# Patient Record
Sex: MALE | Race: WHITE | Employment: OTHER | URBAN - METROPOLITAN AREA
[De-identification: names, ages, dates, MRNs, and addresses within clinical notes are randomized per-mention and may not be internally consistent; named-entity substitution may affect disease eponyms.]

---

## 2017-01-11 RX ORDER — ATENOLOL 100 MG/1
100 TABLET ORAL 2 TIMES DAILY
Qty: 60 TAB | Refills: 5 | Status: SHIPPED | OUTPATIENT
Start: 2017-01-11 | End: 2017-07-13 | Stop reason: SDUPTHER

## 2017-01-13 RX ORDER — OMEPRAZOLE 20 MG/1
CAPSULE, DELAYED RELEASE ORAL
Qty: 30 CAP | Refills: 5 | Status: SHIPPED | OUTPATIENT
Start: 2017-01-13 | End: 2017-07-27 | Stop reason: ALTCHOICE

## 2017-06-18 DIAGNOSIS — D64.9 ANEMIA, UNSPECIFIED TYPE: Primary | ICD-10-CM

## 2017-06-18 DIAGNOSIS — I10 ESSENTIAL HYPERTENSION: ICD-10-CM

## 2017-06-23 ENCOUNTER — HOSPITAL ENCOUNTER (OUTPATIENT)
Dept: LAB | Age: 72
Discharge: HOME | End: 2017-06-23
Payer: MEDICARE

## 2017-06-23 PROCEDURE — 36415 COLL VENOUS BLD VENIPUNCTURE: CPT

## 2017-06-23 PROCEDURE — 80053 COMPREHEN METABOLIC PANEL: CPT

## 2017-06-23 PROCEDURE — 85025 COMPLETE CBC W/AUTO DIFF WBC: CPT

## 2017-06-24 LAB
ALBUMIN SERPL-MCNC: 4.5 G/DL (ref 3.5–4.8)
ALBUMIN/GLOB SERPL: 1.7 {RATIO} (ref 1.2–2.2)
ALP SERPL-CCNC: 77 IU/L (ref 39–117)
ALT SERPL-CCNC: 23 IU/L (ref 0–44)
AST SERPL-CCNC: 26 IU/L (ref 0–40)
BASOPHILS # BLD AUTO: 0 X10E3/UL (ref 0–0.2)
BASOPHILS NFR BLD AUTO: 1 %
BILIRUB SERPL-MCNC: 0.6 MG/DL (ref 0–1.2)
BUN SERPL-MCNC: 21 MG/DL (ref 8–27)
BUN/CREAT SERPL: 24 (ref 10–24)
CALCIUM SERPL-MCNC: 9.7 MG/DL (ref 8.6–10.2)
CHLORIDE SERPL-SCNC: 99 MMOL/L (ref 96–106)
CO2 SERPL-SCNC: 25 MMOL/L (ref 18–29)
CREAT SERPL-MCNC: 0.89 MG/DL (ref 0.76–1.27)
EOSINOPHIL # BLD AUTO: 0.2 X10E3/UL (ref 0–0.4)
EOSINOPHIL NFR BLD AUTO: 4 %
ERYTHROCYTE [DISTWIDTH] IN BLOOD BY AUTOMATED COUNT: 14.5 % (ref 12.3–15.4)
GLOBULIN SER CALC-MCNC: 2.7 G/DL (ref 1.5–4.5)
GLUCOSE SERPL-MCNC: 123 MG/DL (ref 65–99)
HCT VFR BLD AUTO: 46.6 % (ref 37.5–51)
HGB BLD-MCNC: 15.4 G/DL (ref 12.6–17.7)
IMM GRANULOCYTES # BLD: 0 X10E3/UL (ref 0–0.1)
IMM GRANULOCYTES NFR BLD: 0 %
LYMPHOCYTES # BLD AUTO: 2.1 X10E3/UL (ref 0.7–3.1)
LYMPHOCYTES NFR BLD AUTO: 36 %
MCH RBC QN AUTO: 33 PG (ref 26.6–33)
MCHC RBC AUTO-ENTMCNC: 33 G/DL (ref 31.5–35.7)
MCV RBC AUTO: 100 FL (ref 79–97)
MONOCYTES # BLD AUTO: 0.7 X10E3/UL (ref 0.1–0.9)
MONOCYTES NFR BLD AUTO: 11 %
NEUTROPHILS # BLD AUTO: 2.9 X10E3/UL (ref 1.4–7)
NEUTROPHILS NFR BLD AUTO: 48 %
PLATELET # BLD AUTO: 151 X10E3/UL (ref 150–379)
POTASSIUM SERPL-SCNC: 4.9 MMOL/L (ref 3.5–5.2)
PROT SERPL-MCNC: 7.2 G/DL (ref 6–8.5)
RBC # BLD AUTO: 4.66 X10E6/UL (ref 4.14–5.8)
SODIUM SERPL-SCNC: 141 MMOL/L (ref 134–144)
WBC # BLD AUTO: 5.9 X10E3/UL (ref 3.4–10.8)

## 2017-07-13 RX ORDER — ATENOLOL 100 MG/1
TABLET ORAL
Qty: 60 TAB | Refills: 4 | Status: SHIPPED | OUTPATIENT
Start: 2017-07-13 | End: 2017-10-09 | Stop reason: SDUPTHER

## 2017-10-09 RX ORDER — ATENOLOL 100 MG/1
TABLET ORAL
Qty: 60 TAB | Refills: 2 | Status: SHIPPED | OUTPATIENT
Start: 2017-10-09 | End: 2018-04-20 | Stop reason: SDUPTHER

## 2017-11-10 ENCOUNTER — OFFICE VISIT (OUTPATIENT)
Dept: INTERNAL MEDICINE CLINIC | Age: 72
End: 2017-11-10

## 2017-11-10 ENCOUNTER — HOSPITAL ENCOUNTER (OUTPATIENT)
Dept: GENERAL RADIOLOGY | Age: 72
Discharge: HOME | End: 2017-11-10
Payer: MEDICARE

## 2017-11-10 VITALS
WEIGHT: 263 LBS | HEART RATE: 64 BPM | TEMPERATURE: 98.4 F | BODY MASS INDEX: 34.85 KG/M2 | OXYGEN SATURATION: 96 % | DIASTOLIC BLOOD PRESSURE: 86 MMHG | SYSTOLIC BLOOD PRESSURE: 148 MMHG | HEIGHT: 73 IN | RESPIRATION RATE: 16 BRPM

## 2017-11-10 DIAGNOSIS — I10 ESSENTIAL HYPERTENSION: ICD-10-CM

## 2017-11-10 DIAGNOSIS — K51.319 ULCERATIVE RECTOSIGMOIDITIS WITH COMPLICATION (HCC): ICD-10-CM

## 2017-11-10 DIAGNOSIS — M47.26 OSTEOARTHRITIS OF SPINE WITH RADICULOPATHY, LUMBAR REGION: Primary | ICD-10-CM

## 2017-11-10 DIAGNOSIS — M47.26 OSTEOARTHRITIS OF SPINE WITH RADICULOPATHY, LUMBAR REGION: ICD-10-CM

## 2017-11-10 PROCEDURE — 72100 X-RAY EXAM L-S SPINE 2/3 VWS: CPT

## 2017-11-10 RX ORDER — TRAMADOL HYDROCHLORIDE 50 MG/1
50 TABLET ORAL
Qty: 40 TAB | Refills: 1 | Status: SHIPPED | OUTPATIENT
Start: 2017-11-10 | End: 2018-08-29 | Stop reason: SDUPTHER

## 2017-11-10 RX ORDER — ACETAMINOPHEN 325 MG/1
650 TABLET ORAL
COMMUNITY
Start: 2017-11-10 | End: 2019-04-24 | Stop reason: ALTCHOICE

## 2017-11-10 NOTE — MR AVS SNAPSHOT
Visit Information Date & Time Provider Department Dept. Phone Encounter #  
 11/10/2017  2:15 PM Yolanda Hand MD [address] [facility] [phone] [id number] Follow-up Instructions Return for Wellness Visit. Your Appointments 4/20/2018 10:30 AM  
Medicare Physical with Yolanda Hand MD  
[address] Internal Medicine [facility] Appt Note: [address] [facility] [address]  
[id number] [place] [address] Upcoming Health Maintenance Date Due Influenza Age 5 to Adult 8/1/2017 GLAUCOMA SCREENING Q2Y 10/1/2017 MEDICARE YEARLY EXAM 10/8/2017 COLONOSCOPY 2/7/2020 DTaP/Tdap/Td series (2 - Td) 8/8/2025 Allergies as of 11/10/2017  Review Complete On: 11/10/2017 By: Maria Antonia Rosario LPN Severity Noted Reaction Type Reactions No Known Allergies  10/07/2016    Other (comments) Current Immunizations  Reviewed on 10/7/2016 Name Date Influenza High Dose Vaccine PF 9/27/2016 Pneumococcal Conjugate (PCV-13) 9/1/2015 Pneumococcal Polysaccharide (PPSV-23) 10/3/2011 Td 8/8/2015 Tdap 8/8/2015 ZZZ-RETIRED (DO NOT USE) Pneumococcal Vaccine (Unspecified Type) 10/3/2011 Zoster Vaccine, Live 10/2/2015 Not reviewed this visit You Were Diagnosed With   
  
 Codes Comments Osteoarthritis of spine with radiculopathy, lumbar region    -  Primary ICD-10-CM: M47.26 
ICD-9-CM: 721.3 Essential hypertension     ICD-10-CM: I10 
ICD-9-CM: 401.9 Ulcerative [facility]os[facility] ([facility]     FFB-12-UH: O25.616 ICD-9-CM: 000. 3 Vitals BP Pulse Temp Resp Height(growth percentile) Weight(growth percentile) 148/86 64 98.4 °F (36.9 °C) (Oral) 16 6' 1\" (1.854 m) 263 lb (119.3 kg) SpO2 BMI Smoking Status 96% 34.7 kg/m2 Former Smoker Vitals History BMI and BSA Data Body Mass Index Body Surface Area 34.7 kg/m 2 2.48 m 2 Preferred Pharmacy Pharmacy Name Phone [facility] [phone] Your Updated Medication List  
  
   
This list is accurate as of: 11/10/17  3:03 PM.  Always use your most recent med list.  
  
  
  
  
 acetaminophen 325 mg tablet Commonly known as:  TYLENOL Take 2 Tabs by mouth every six (6) hours as needed for Pain. atenolol 100 mg tablet Commonly known as:  TENORMIN  
TAKE 1 TABLET BY MOUTH TWICE A DAY  
  
 FIBER PO Take  by mouth. MULTIVITAMIN PO Take  by mouth. Takes one po daily. traMADol 50 mg tablet Commonly known as:  ULTRAM  
Take 1 Tab by mouth every six (6) hours as needed for Pain. Max Daily Amount: 200 mg. Prescriptions Printed Refills  
 traMADol (ULTRAM) 50 mg tablet 1 Sig: Take 1 Tab by mouth every six (6) hours as needed for Pain. Max Daily Amount: 200 mg. Class: Print Route: Oral  
  
We Performed the Following CBC WITH AUTOMATED DIFF [39775 CPT(R)] LIPID PANEL [14701 CPT(R)] METABOLIC PANEL, COMPREHENSIVE [32531 CPT(R)] TSH RFX ON ABNORMAL TO FREE T4 [[id number] Custom] UA/M W/RFLX CULTURE, ROUTINE [[id number] Custom] Follow-up Instructions Return for Wellness Visit. To-Do List   
 11/10/2017 Imaging:  XR SPINE LUMB MIN 4 V Introducing [facility]! Dear Ronaldo Koehler: Thank you for requesting a [employer] account. Our records indicate that you already have an active [employer] account. You can access your account anytime at [email or web address] Did you know that you can access your hospital and ER discharge instructions at any time in [employer]? You can also review all of your test results from your hospital stay or ER visit. Additional Information If you have questions, please visit the Frequently Asked Questions section of the [employer] website at [email or web address] Remember, [employer] is NOT to be used for urgent needs. For medical emergencies, dial 911. Now available from your iPhone and Android! Please provide this summary of care documentation to your next provider. Your primary care clinician is listed as Tim [id number] If you have any questions after today's visit, please call [phone].

## 2017-11-11 NOTE — PROGRESS NOTES
HPI:  Aruna Myrick is a 71y.o. year old male who is here for a routine visit:    Here for follow-up visit. He fell 2 days ago when he lost his balance and struck the lower aspect of his spine. Since then he has had pain particularly with sitting. There is no pain that radiates to the legs. He does have long-standing chronic issues with lower back pain with pain radiating to the legs that is unchanged now. He is having an increased difficulty with tolerating the lower back pain for the chronic issues as well. He has had previous injections in the back with limited success. Denies any change in bowel or bladder habits. He has not been taking any Tylenol. Denies any chest pains or shortness of breath. Past Medical History:   Diagnosis Date    Allergic rhinitis     Arthritis     Back pain     Chronic pain lower back    plus many other joints    Hypertension     Hypogonadism male     PUD (peptic ulcer disease)     UC (ulcerative colitis) ([facility] 1/20/2010    Ulcerative colitis     Unspecified essential hypertension 1/20/2010       Past Surgical History:   Procedure Laterality Date    HX COLONOSCOPY  02/07/2017    3 yr f/u - Dr. Rod Madden         Prior to Admission medications    Medication Sig Start Date End Date Taking? Authorizing Provider   traMADol (ULTRAM) 50 mg tablet Take 1 Tab by mouth every six (6) hours as needed for Pain. Max Daily Amount: 200 mg. 11/10/17  Yes Jeanie Parekh III, MD   acetaminophen (TYLENOL) 325 mg tablet Take 2 Tabs by mouth every six (6) hours as needed for Pain. 11/10/17  Yes Jeanie Parekh III, MD   atenolol (TENORMIN) 100 mg tablet TAKE 1 TABLET BY MOUTH TWICE A DAY 10/9/17  Yes Jeanie Parekh III, MD   PSYLLIUM SEED, WITH DEXTROSE, (FIBER PO) Take  by mouth. Yes Historical Provider   MULTIVITAMIN PO Take  by mouth. Takes one po daily.     Yes Historical Provider       Social History     Social History    Marital status:      Spouse name: N/A    Number of children: N/A    Years of education: N/A     Occupational History    Not on file. Social History Main Topics    Smoking status: Former Smoker     Years: 15.00     Quit date: 1/1/1995    Smokeless tobacco: Never Used      Comment: quit smoking cigarettes 20 yrs ago    Alcohol use 3.6 oz/week     7 drink(s) per week    Drug use: No    Sexual activity: No     Other Topics Concern    Not on file     Social History Narrative          ROS  Per HPI    Visit Vitals    /86    Pulse 64    Temp 98.4 °F (36.9 °C) (Oral)    Resp 16    Ht 6' 1\" (1.854 m)    Wt 263 lb (119.3 kg)    SpO2 96%    BMI 34.7 kg/m2         Physical Exam   Physical Examination: General appearance - alert, well appearing, and in no distress  Mouth - mucous membranes moist, pharynx normal without lesions  Neck - supple, no significant adenopathy  Lymphatics - no palpable lymphadenopathy, no hepatosplenomegaly  Chest - clear to auscultation, no wheezes, rales or rhonchi, symmetric air entry  Heart - normal rate, regular rhythm, normal S1, S2, no murmurs, rubs, clicks or gallops  Abdomen - soft, nontender, nondistended, no masses or organomegaly  Back exam - limited range of motion, pain with motion noted during exam, tenderness noted marked tenderness across the lower lumbar and sacral spine. No bony deformity or bruising present. , sacroiliac joints and sciatic notches nontender, normal reflexes and strength bilateral lower extremities  Musculoskeletal - no joint tenderness, deformity or swelling  Extremities - peripheral pulses normal, no pedal edema, no clubbing or cyanosis      Assessment/Plan:  Diagnoses and all orders for this visit:    1. Osteoarthritis of spine with radiculopathy, lumbar region with acute increase in pain secondary to fall. Concerned about the possibility of an occult fracture. Also likely underlying spinal stenosis that needs to be dealt with.   I will refer him for an x-ray to evaluate for fracture. If negative would pursue an MRI and a pain management appointment to consider facet joint injections. In the meantime we will have him use Tylenol daily as well as a prescription for tramadol for severe pain. -     traMADol (ULTRAM) 50 mg tablet; Take 1 Tab by mouth every six (6) hours as needed for Pain. Max Daily Amount: 200 mg.    2. Essential hypertension blood pressure well controlled. Will continue current medicines for now. Check labs to be sure that kidney function is stable. -     CBC WITH AUTOMATED DIFF  -     METABOLIC PANEL, COMPREHENSIVE  -     LIPID PANEL  -     TSH RFX ON ABNORMAL TO FREE T4  -     UA/M W/RFLX CULTURE, ROUTINE    3. Ulcerative rectosigmoiditis with complication ([address] currently stable with no recurrence. No bleeding. Will check CBC for stability. Follow-up Disposition:  Return for Wellness Visit. Advised him to call back or return to office if symptoms worsen/change/persist.  Discussed expected course/resolution/complications of diagnosis in detail with patient. Medication risks/benefits/costs/interactions/alternatives discussed with patient. He was given an after visit summary which includes diagnoses, current medications, & vitals. He expressed understanding with the diagnosis and plan.

## 2017-11-13 ENCOUNTER — TELEPHONE (OUTPATIENT)
Dept: INTERNAL MEDICINE CLINIC | Age: 72
End: 2017-11-13

## 2017-11-13 DIAGNOSIS — M48.061 SPINAL STENOSIS OF LUMBAR REGION, UNSPECIFIED WHETHER NEUROGENIC CLAUDICATION PRESENT: Primary | ICD-10-CM
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
## 2017-11-13 DIAGNOSIS — M54.40 LOW BACK PAIN WITH SCIATICA, SCIATICA LATERALITY UNSPECIFIED, UNSPECIFIED BACK PAIN LATERALITY, UNSPECIFIED CHRONICITY: ICD-10-CM

## 2017-11-15 ENCOUNTER — HOSPITAL ENCOUNTER (OUTPATIENT)
Dept: MRI IMAGING | Age: 72
Discharge: HOME | End: 2017-11-15
Attending: INTERNAL MEDICINE
Payer: MEDICARE

## 2017-11-15 DIAGNOSIS — M48.061 SPINAL STENOSIS OF LUMBAR REGION, UNSPECIFIED WHETHER NEUROGENIC CLAUDICATION PRESENT: ICD-10-CM
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
## 2017-11-15 DIAGNOSIS — M54.40 LOW BACK PAIN WITH SCIATICA, SCIATICA LATERALITY UNSPECIFIED, UNSPECIFIED BACK PAIN LATERALITY, UNSPECIFIED CHRONICITY: ICD-10-CM

## 2017-11-15 PROCEDURE — 72148 MRI LUMBAR SPINE W/O DYE: CPT

## 2017-11-17 DIAGNOSIS — M47.26 OSTEOARTHRITIS OF SPINE WITH RADICULOPATHY, LUMBAR REGION: Primary | ICD-10-CM

## 2017-11-22 ENCOUNTER — HOSPITAL ENCOUNTER (OUTPATIENT)
Dept: LAB | Age: 72
Discharge: HOME | End: 2017-11-22
Payer: MEDICARE

## 2017-11-22 PROCEDURE — 85025 COMPLETE CBC W/AUTO DIFF WBC: CPT

## 2017-11-22 PROCEDURE — 83036 HEMOGLOBIN GLYCOSYLATED A1C: CPT

## 2017-11-22 PROCEDURE — 84443 ASSAY THYROID STIM HORMONE: CPT

## 2017-11-22 PROCEDURE — 36415 COLL VENOUS BLD VENIPUNCTURE: CPT

## 2017-11-22 PROCEDURE — 81001 URINALYSIS AUTO W/SCOPE: CPT

## 2017-11-22 PROCEDURE — 80061 LIPID PANEL: CPT

## 2017-11-22 PROCEDURE — 80053 COMPREHEN METABOLIC PANEL: CPT

## 2017-11-23 LAB
ALBUMIN SERPL-MCNC: 4.4 G/DL (ref 3.5–4.8)
ALBUMIN/GLOB SERPL: 1.8 {RATIO} (ref 1.2–2.2)
ALP SERPL-CCNC: 84 IU/L (ref 39–117)
ALT SERPL-CCNC: 22 IU/L (ref 0–44)
APPEARANCE UR: CLEAR
AST SERPL-CCNC: 23 IU/L (ref 0–40)
BACTERIA #/AREA URNS HPF: NORMAL /[HPF]
BASOPHILS # BLD AUTO: 0 X10E3/UL (ref 0–0.2)
BASOPHILS NFR BLD AUTO: 1 %
BILIRUB SERPL-MCNC: 0.4 MG/DL (ref 0–1.2)
BILIRUB UR QL STRIP: NEGATIVE
BUN SERPL-MCNC: 19 MG/DL (ref 8–27)
BUN/CREAT SERPL: 23 (ref 10–24)
CALCIUM SERPL-MCNC: 9.9 MG/DL (ref 8.6–10.2)
CASTS URNS QL MICRO: NORMAL /LPF
CHLORIDE SERPL-SCNC: 99 MMOL/L (ref 96–106)
CHOLEST SERPL-MCNC: 214 MG/DL (ref 100–199)
CO2 SERPL-SCNC: 27 MMOL/L (ref 18–29)
COLOR UR: YELLOW
CREAT SERPL-MCNC: 0.84 MG/DL (ref 0.76–1.27)
EOSINOPHIL # BLD AUTO: 0.3 X10E3/UL (ref 0–0.4)
EOSINOPHIL NFR BLD AUTO: 5 %
EPI CELLS #/AREA URNS HPF: NORMAL /HPF
ERYTHROCYTE [DISTWIDTH] IN BLOOD BY AUTOMATED COUNT: 13.3 % (ref 12.3–15.4)
GFR SERPLBLD CREATININE-BSD FMLA CKD-EPI: 102 ML/MIN/1.73
GFR SERPLBLD CREATININE-BSD FMLA CKD-EPI: 88 ML/MIN/1.73
GLOBULIN SER CALC-MCNC: 2.5 G/DL (ref 1.5–4.5)
GLUCOSE SERPL-MCNC: 129 MG/DL (ref 65–99)
GLUCOSE UR QL: NEGATIVE
HCT VFR BLD AUTO: 45.5 % (ref 37.5–51)
HDLC SERPL-MCNC: 49 MG/DL
HGB BLD-MCNC: 15.3 G/DL (ref 12.6–17.7)
HGB UR QL STRIP: NEGATIVE
IMM GRANULOCYTES # BLD: 0 X10E3/UL (ref 0–0.1)
IMM GRANULOCYTES NFR BLD: 0 %
KETONES UR QL STRIP: NEGATIVE
LDLC SERPL CALC-MCNC: 133 MG/DL (ref 0–99)
LEUKOCYTE ESTERASE UR QL STRIP: NEGATIVE
LYMPHOCYTES # BLD AUTO: 1.7 X10E3/UL (ref 0.7–3.1)
LYMPHOCYTES NFR BLD AUTO: 31 %
MCH RBC QN AUTO: 33.8 PG (ref 26.6–33)
MCHC RBC AUTO-ENTMCNC: 33.6 G/DL (ref 31.5–35.7)
MCV RBC AUTO: 101 FL (ref 79–97)
MICRO URNS: NORMAL
MICRO URNS: NORMAL
MONOCYTES # BLD AUTO: 0.6 X10E3/UL (ref 0.1–0.9)
MONOCYTES NFR BLD AUTO: 10 %
MUCOUS THREADS URNS QL MICRO: PRESENT
NEUTROPHILS # BLD AUTO: 2.9 X10E3/UL (ref 1.4–7)
NEUTROPHILS NFR BLD AUTO: 53 %
NITRITE UR QL STRIP: NEGATIVE
PH UR STRIP: 6 [PH] (ref 5–7.5)
PLATELET # BLD AUTO: 185 X10E3/UL (ref 150–379)
POTASSIUM SERPL-SCNC: 4.5 MMOL/L (ref 3.5–5.2)
PROT SERPL-MCNC: 6.9 G/DL (ref 6–8.5)
PROT UR QL STRIP: NEGATIVE
RBC # BLD AUTO: 4.52 X10E6/UL (ref 4.14–5.8)
RBC #/AREA URNS HPF: NORMAL /HPF
SODIUM SERPL-SCNC: 142 MMOL/L (ref 134–144)
SP GR UR: 1.02 (ref 1–1.03)
TRIGL SERPL-MCNC: 161 MG/DL (ref 0–149)
TSH SERPL DL<=0.005 MIU/L-ACNC: 1.35 UIU/ML (ref 0.45–4.5)
URINALYSIS REFLEX, 377202: NORMAL
UROBILINOGEN UR STRIP-MCNC: 0.2 MG/DL (ref 0.2–1)
VLDLC SERPL CALC-MCNC: 32 MG/DL (ref 5–40)
WBC # BLD AUTO: 5.5 X10E3/UL (ref 3.4–10.8)
WBC #/AREA URNS HPF: NORMAL /HPF

## 2017-12-01 LAB
HBA1C MFR BLD: 5.2 % (ref 4.8–5.6)
SPECIMEN STATUS REPORT, ROLRST: NORMAL

## 2018-04-03 RX ORDER — ATENOLOL 100 MG/1
TABLET ORAL
Qty: 60 TAB | Refills: 3 | Status: SHIPPED | OUTPATIENT
Start: 2018-04-03 | End: 2018-08-03 | Stop reason: SDUPTHER

## 2018-04-20 ENCOUNTER — OFFICE VISIT (OUTPATIENT)
Dept: INTERNAL MEDICINE CLINIC | Age: 73
End: 2018-04-20

## 2018-04-20 VITALS
RESPIRATION RATE: 14 BRPM | HEIGHT: 73 IN | OXYGEN SATURATION: 96 % | WEIGHT: 274.6 LBS | TEMPERATURE: 98 F | HEART RATE: 81 BPM | BODY MASS INDEX: 36.39 KG/M2

## 2018-04-20 DIAGNOSIS — Z71.89 ADVANCED CARE PLANNING/COUNSELING DISCUSSION: ICD-10-CM

## 2018-04-20 DIAGNOSIS — F41.9 ANXIETY: ICD-10-CM

## 2018-04-20 DIAGNOSIS — Z23 NEED FOR SHINGLES VACCINE: ICD-10-CM

## 2018-04-20 DIAGNOSIS — M48.062 SPINAL STENOSIS OF LUMBAR REGION WITH NEUROGENIC CLAUDICATION: ICD-10-CM

## 2018-04-20 DIAGNOSIS — Z13.31 SCREENING FOR DEPRESSION: ICD-10-CM

## 2018-04-20 DIAGNOSIS — Z12.5 PROSTATE CANCER SCREENING: ICD-10-CM

## 2018-04-20 DIAGNOSIS — I10 ESSENTIAL HYPERTENSION: ICD-10-CM

## 2018-04-20 DIAGNOSIS — K51.319 ULCERATIVE RECTOSIGMOIDITIS WITH COMPLICATION (HCC): ICD-10-CM

## 2018-04-20 DIAGNOSIS — Z23 ENCOUNTER FOR IMMUNIZATION: ICD-10-CM

## 2018-04-20 DIAGNOSIS — Z13.39 SCREENING FOR ALCOHOLISM: ICD-10-CM

## 2018-04-20 DIAGNOSIS — Z00.00 MEDICARE ANNUAL WELLNESS VISIT, SUBSEQUENT: Primary | ICD-10-CM

## 2018-04-20 PROBLEM — E66.01 SEVERE OBESITY (BMI 35.0-39.9) WITH COMORBIDITY (HCC): Status: ACTIVE | Noted: 2018-04-20

## 2018-04-20 RX ORDER — DULOXETIN HYDROCHLORIDE 20 MG/1
20 CAPSULE, DELAYED RELEASE ORAL DAILY
Qty: 30 CAP | Refills: 1 | Status: SHIPPED | OUTPATIENT
Start: 2018-04-20 | End: 2018-06-23 | Stop reason: SDUPTHER

## 2018-04-20 NOTE — PROGRESS NOTES
Elvia Cruz is a 71 y.o. male and presents for Annual Medicare Wellness Visit. Assessment of cognitive impairment: Alert and oriented x 3. Abuse Screen:  No flowsheet data found. Depression Screen:   PHQ over the last two weeks 4/20/2018   Little interest or pleasure in doing things Not at all   Feeling down, depressed or hopeless Several days   Total Score PHQ 2 1       Fall Risk Assessment:    Fall Risk Assessment, last 12 mths 4/20/2018   Able to walk? Yes   Fall in past 12 months? No   Fall with injury? -   Number of falls in past 12 months -   Fall Risk Score -       Activities of Daily Living:    ADL Assessment 4/20/2018   Feeding yourself No Help Needed   Getting from bed to chair No Help Needed   Getting dressed No Help Needed   Bathing or showering No Help Needed   Walk across the room (includes cane/walker) No Help Needed   Using the telphone No Help Needed   Taking your medications No Help Needed   Preparing meals No Help Needed   Managing money (expenses/bills) No Help Needed   Moderately strenuous housework (laundry) No Help Needed   Shopping for personal items (toiletries/medicines) No Help Needed   Shopping for groceries No Help Needed   Driving No Help Needed   Climbing a flight of stairs No Help Needed   Getting to places beyond walking distances No Help Needed       Health Maintenance:  Daily Low Dose Aspirin: no  Bone Density: NA  Glaucoma Screening: yes UTD with Dr. Hortensia Duarte 12/4/17  Immunizations:    Tetanus: up to date 8/8/15. Influenza: up to date 12/13/17. Shingles:  Zostavax: up to date 10/2/15. Shingrix:order placed   Pneumovax:  up to date 10/3/11. Prevnar: up to date 9/1/15. Cancer screening:    Cervical: NA.  Breast: NA.  Colon: up to date 2/7/17 q3 years. Prostate:  Order placed for PSA today, last PSA 1.2 on 7/8/16    Advance Care Planning:   End of Life Planning: has an advanced directive - a copy has been provided.   Provided pt with \"Respecting Choices packet of Information\" no  Offered facilitator session with NN no     Medications/Allergies: Reviewed with patient  Prior to Admission medications    Medication Sig Start Date End Date Taking? Authorizing Provider   atenolol (TENORMIN) 100 mg tablet TAKE ONE TABLET BY MOUTH TWICE A DAY 4/3/18  Yes Deborah Smith III, MD   PSYLLIUM SEED, WITH DEXTROSE, (FIBER PO) Take  by mouth. Yes Historical Provider   MULTIVITAMIN PO Take  by mouth. Takes one po daily. Yes Historical Provider   traMADol (ULTRAM) 50 mg tablet Take 1 Tab by mouth every six (6) hours as needed for Pain. Max Daily Amount: 200 mg. 11/10/17   Deborah Smith III, MD   acetaminophen (TYLENOL) 325 mg tablet Take 2 Tabs by mouth every six (6) hours as needed for Pain. 11/10/17   Abbi Del Rio MD     Allergies   Allergen Reactions    No Known Allergies Other (comments)       PSH: Reviewed with patient  Past Surgical History:   Procedure Laterality Date    HX COLONOSCOPY  02/07/2017    3 yr f/u - Dr. Pipe Watson EXTRACTION          SH: Reviewed with patient  Social History   Substance Use Topics    Smoking status: Former Smoker     Years: 15.00     Quit date: 1/1/1995    Smokeless tobacco: Never Used      Comment: quit smoking cigarettes 20 yrs ago    Alcohol use 3.6 oz/week     7 drink(s) per week       FH: Reviewed with patient  Family History   Problem Relation Age of Onset    Cancer Mother      ovarian,colon         Objective:  Visit Vitals    Pulse 81    Temp 98 °F (36.7 °C) (Oral)    Resp 14    Ht 6' 1\" (1.854 m)    Wt 274 lb 9.6 oz (124.6 kg)    SpO2 96%    BMI 36.23 kg/m2    Body mass index is 36.23 kg/(m^2). Alcohol Risk Screen:   On any occasion during past 3 months, have you had more than 3 drinks (female) or 4 drinks (male) containing alcohol? No  Do you average more than 7 drinks (female) or 14 drinks (male) per week?   Yes  Type and Amount: 3-4 drinks per night, counseled that recommendation is no more than 2 drinks per day for men. Tobacco Abuse:  No    Nutrition Screen:  eats a balanced diet    Hearing Loss:  denies any hearing loss    Vision Loss:   Wears glasses, contact lenses, or have any other visual impairment  Recent cataract surgery, wears reading glasses    Activities of Daily Living:  Self-care. Requires assistance with: no ADLs  Patient handle his/her own medications  yes     Exercise:  Over the last 7 days how many days have you exercised? Rides stationary bike at home daily    Current medical providers:    Patient Care Team:  Chitra Chester MD as PCP - General  Kaylan Daly MD (Gastroenterology)  Radha Reyes MD (Ophthalmology)      Plan:      No orders of the defined types were placed in this encounter. Health Maintenance   Topic Date Due    MEDICARE YEARLY EXAM  03/14/2018    GLAUCOMA SCREENING Q2Y  12/04/2019    COLONOSCOPY  02/07/2020    DTaP/Tdap/Td series (2 - Td) 08/08/2025    Hepatitis C Screening  Completed    ZOSTER VACCINE AGE 60>  Completed    Pneumococcal 65+ Low/Medium Risk  Addressed    Influenza Age 5 to Adult  Addressed       *Patient verbalized understanding and agreement with the plan. A copy of the After Visit Summary with personalized health plan was given to the patient today. Physical Exam will be performed by PCP and documented under a separate Progress Note.

## 2018-04-20 NOTE — PATIENT INSTRUCTIONS
Today you had a Medicare Wellness Visit. During this visit, we developed and/or updated your personalized health plan to prevent disease and disability based on your current health and risk factors. Please schedule an appt around this time next year so we can continue to keep you on the right path to living a healthy lifestyle. Schedule of Personalized Health Plan    The best way to stay healthy is to live a healthy lifestyle. A healthy lifestyle includes regular exercise, eating a well-balanced diet, keeping a healthy weight and not smoking. Regular physical exams and screening tests are another important way to take care of yourself. Preventive exams provided by health care providers can find health problems early when treatment works best and can keep you from getting certain diseases or illnesses. Preventive services include exams, lab tests, screenings, shots, monitoring and information to help you take care of your own health. All people over 65 should have a pneumonia shot. Pneumonia shots are usually only needed once in a lifetime unless your doctor decides differently. All people over 65 should have a yearly flu shot. People over 65 are at medium to high risk for Hepatitis B. Three shots are needed for complete protection. Talk with your provider for more details. In addition to your physical exam, some screening tests are recommended:    Diabetes Mellitus screening is recommended every year. Glaucoma is an eye disease caused by high pressure in the eye. An eye exam is recommended every year. Cardiovascular screening tests that check your cholesterol and other blood fat (lipid) levels are recommended every five years. Colorectal Cancer screening tests help to find pre-cancerous polyps (growths in the colon) so they can be removed before they turn into cancer. Tests ordered for screening depend on your personal and family history risk factors.       Here is a list of your current Health Maintenance items with a due date:  Health Maintenance   Topic Date Due    MEDICARE YEARLY EXAM  03/14/2018    GLAUCOMA SCREENING Q2Y  12/04/2019    COLONOSCOPY  02/07/2020    DTaP/Tdap/Td series (2 - Td) 08/08/2025    Hepatitis C Screening  Completed    ZOSTER VACCINE AGE 60>  Completed    Pneumococcal 65+ Low/Medium Risk  Addressed    Influenza Age 5 to Adult  Addressed

## 2018-04-20 NOTE — PROGRESS NOTES
HPI:  Molly Torres is a 71y.o. year old male who is here for a routine visit:    Here for his Medicare wellness visit as well as his follow-up visit. He was seen in consultation with the nurse navigator and history was reviewed and I concur with her findings. Initially his blood pressure was elevated here. On repeat his blood pressure had improved. He does note ongoing issues with lower back pain. He has fatigue in his legs when he walks. No numbness or tingling or weakness. No bowel or bladder change. Denies any melena or hematochezia. At home his blood pressure readings are in the 120/80 range. He does drink 4-5 glasses of wine per day and feels anxious and depressed. Denies suicidal ideation. He wanted to know about using medications for his back pain. He has had 2 epidural injections by the pain doctors with limited success. Past Medical History:   Diagnosis Date    Allergic rhinitis     Arthritis     Back pain     Chronic pain lower back    plus many other joints    Hypertension     Hypogonadism male     PUD (peptic ulcer disease)     UC (ulcerative colitis) ([facility] [facility] 1/20/2010    Ulcerative colitis     Unspecified essential hypertension 1/20/2010       Past Surgical History:   Procedure Laterality Date    HX COLONOSCOPY  02/07/2017    3 yr f/u - Dr. Darren Hernandez         Prior to Admission medications    Medication Sig Start Date End Date Taking? Authorizing Provider   varicella-zoster recombinant, PF, (SHINGRIX, PF,) 50 mcg/0.5 mL susr injection 0.5 mL by IntraMUSCular route once for 1 dose. 4/20/18 4/20/18 Yes David Fried III, MD   DULoxetine (CYMBALTA) 20 mg capsule Take 1 Cap by mouth daily. 4/20/18  Yes David Fried III, MD   atenolol (TENORMIN) 100 mg tablet TAKE ONE TABLET BY MOUTH TWICE A DAY 4/3/18  Yes David Fried III, MD   PSYLLIUM SEED, WITH DEXTROSE, (FIBER PO) Take  by mouth.    Yes Historical Provider MULTIVITAMIN PO Take  by mouth. Takes one po daily. Yes Historical Provider   traMADol (ULTRAM) 50 mg tablet Take 1 Tab by mouth every six (6) hours as needed for Pain. Max Daily Amount: 200 mg. 11/10/17   Justin Lugo III, MD   acetaminophen (TYLENOL) 325 mg tablet Take 2 Tabs by mouth every six (6) hours as needed for Pain. 11/10/17   Jose Del Rio MD       Social History     Social History    Marital status:      Spouse name: N/A    Number of children: N/A    Years of education: N/A     Occupational History    Not on file.      Social History Main Topics    Smoking status: Former Smoker     Years: 15.00     Quit date: 1/1/1995    Smokeless tobacco: Never Used      Comment: quit smoking cigarettes 20 yrs ago    Alcohol use 3.6 oz/week     7 drink(s) per week    Drug use: No    Sexual activity: No     Other Topics Concern    Not on file     Social History Narrative          ROS  Per HPI    Visit Vitals    Pulse 81    Temp 98 °F (36.7 °C) (Oral)    Resp 14    Ht 6' 1\" (1.854 m)    Wt 274 lb 9.6 oz (124.6 kg)    SpO2 96%    BMI 36.23 kg/m2         Physical Exam   Physical Examination: General appearance - alert, well appearing, and in no distress  Eyes - pupils equal and reactive, extraocular eye movements intact  Ears - bilateral TM's and external ear canals normal  Nose - normal and patent, no erythema, discharge or polyps  Mouth - mucous membranes moist, pharynx normal without lesions  Neck - supple, no significant adenopathy  Lymphatics - no palpable lymphadenopathy, no hepatosplenomegaly  Chest - clear to auscultation, no wheezes, rales or rhonchi, symmetric air entry  Heart - normal rate, regular rhythm, normal S1, S2, no murmurs, rubs, clicks or gallops  Abdomen - soft, nontender, nondistended, no masses or organomegaly  Rectal - deferred, not clinically indicated  Back exam - limited range of motion, pain with motion noted during exam, tenderness noted along the lower back muscles, negative straight-leg raise bilaterally at 45 degrees, normal reflexes and strength bilateral lower extremities  Neurological - alert, oriented, normal speech, no focal findings or movement disorder noted, motor and sensory grossly normal bilaterally  Musculoskeletal - no joint tenderness, deformity or swelling  Extremities - peripheral pulses normal, no pedal edema, no clubbing or cyanosis      Assessment/Plan:  Diagnoses and all orders for this visit:    1. Medicare annual wellness visit, subsequent    2. Advanced care planning/counseling discussion    3. Screening for depression    4. Screening for alcoholism    5. Need for shingles vaccine  -     varicella-zoster recombinant, PF, (SHINGRIX, PF,) 50 mcg/0.5 mL susr injection; 0.5 mL by IntraMUSCular route once for 1 dose. 6. Encounter for immunization  -     varicella-zoster recombinant, PF, (SHINGRIX, PF,) 50 mcg/0.5 mL susr injection; 0.5 mL by IntraMUSCular route once for 1 dose. 7. Essential hypertension -with a great degree of whitecoat hypertension. Will continue to monitor his blood pressures at home and check labs to be sure these are stable. -     CBC WITH AUTOMATED DIFF  -     METABOLIC PANEL, COMPREHENSIVE  -     LIPID PANEL  -     TSH RFX ON ABNORMAL TO FREE T4    8. Ulcerative rectosigmoiditis with complication ([facility] [address] -per GI. He is up-to-date on colonoscopies. 9. Prostate cancer screening  -     PSA SCREENING (SCREENING)  10.  Spinal stenosis mildwe will treat with Tylenol for now and as needed tramadol as well as the Cymbalta. He is aware that he needs to limit his alcohol consumption while taking this. Would consider the addition of facet joint injections or ablation if his pain persist.  Other orders -ongoing issues with anxiety and depression. Will try low-dose Cymbalta and see if this is helpful. I told him he cannot drink heavily while taking the Cymbalta and he is aware.   -     DULoxetine (CYMBALTA) 20 mg capsule; Take 1 Cap by mouth daily. Follow-up Disposition: 1-2 months. Advised him to call back or return to office if symptoms worsen/change/persist.  Discussed expected course/resolution/complications of diagnosis in detail with patient. Medication risks/benefits/costs/interactions/alternatives discussed with patient. He was given an after visit summary which includes diagnoses, current medications, & vitals. He expressed understanding with the diagnosis and plan.

## 2018-04-23 ENCOUNTER — HOSPITAL ENCOUNTER (OUTPATIENT)
Dept: LAB | Age: 73
Discharge: HOME | End: 2018-04-23
Payer: MEDICARE

## 2018-04-23 PROCEDURE — 80053 COMPREHEN METABOLIC PANEL: CPT

## 2018-04-23 PROCEDURE — 36415 COLL VENOUS BLD VENIPUNCTURE: CPT

## 2018-04-23 PROCEDURE — 80061 LIPID PANEL: CPT

## 2018-04-23 PROCEDURE — 84443 ASSAY THYROID STIM HORMONE: CPT

## 2018-04-23 PROCEDURE — 85025 COMPLETE CBC W/AUTO DIFF WBC: CPT

## 2018-04-23 PROCEDURE — 84153 ASSAY OF PSA TOTAL: CPT

## 2018-04-24 LAB
ALBUMIN SERPL-MCNC: 4.5 G/DL (ref 3.5–4.8)
ALBUMIN/GLOB SERPL: 1.6 {RATIO} (ref 1.2–2.2)
ALP SERPL-CCNC: 80 IU/L (ref 39–117)
ALT SERPL-CCNC: 26 IU/L (ref 0–44)
AST SERPL-CCNC: 30 IU/L (ref 0–40)
BASOPHILS # BLD AUTO: 0 X10E3/UL (ref 0–0.2)
BASOPHILS NFR BLD AUTO: 0 %
BILIRUB SERPL-MCNC: 0.8 MG/DL (ref 0–1.2)
BUN SERPL-MCNC: 21 MG/DL (ref 8–27)
BUN/CREAT SERPL: 21 (ref 10–24)
CALCIUM SERPL-MCNC: 9.6 MG/DL (ref 8.6–10.2)
CHLORIDE SERPL-SCNC: 97 MMOL/L (ref 96–106)
CHOLEST SERPL-MCNC: 214 MG/DL (ref 100–199)
CO2 SERPL-SCNC: 21 MMOL/L (ref 18–29)
CREAT SERPL-MCNC: 1.01 MG/DL (ref 0.76–1.27)
EOSINOPHIL # BLD AUTO: 0.2 X10E3/UL (ref 0–0.4)
EOSINOPHIL NFR BLD AUTO: 4 %
ERYTHROCYTE [DISTWIDTH] IN BLOOD BY AUTOMATED COUNT: 13.4 % (ref 12.3–15.4)
GFR SERPLBLD CREATININE-BSD FMLA CKD-EPI: 74 ML/MIN/1.73
GFR SERPLBLD CREATININE-BSD FMLA CKD-EPI: 86 ML/MIN/1.73
GLOBULIN SER CALC-MCNC: 2.8 G/DL (ref 1.5–4.5)
GLUCOSE SERPL-MCNC: 116 MG/DL (ref 65–99)
HCT VFR BLD AUTO: 46.4 % (ref 37.5–51)
HDLC SERPL-MCNC: 43 MG/DL
HGB BLD-MCNC: 16.5 G/DL (ref 13–17.7)
IMM GRANULOCYTES # BLD: 0 X10E3/UL (ref 0–0.1)
IMM GRANULOCYTES NFR BLD: 1 %
LDLC SERPL CALC-MCNC: 141 MG/DL (ref 0–99)
LYMPHOCYTES # BLD AUTO: 2.2 X10E3/UL (ref 0.7–3.1)
LYMPHOCYTES NFR BLD AUTO: 35 %
MCH RBC QN AUTO: 34.9 PG (ref 26.6–33)
MCHC RBC AUTO-ENTMCNC: 35.6 G/DL (ref 31.5–35.7)
MCV RBC AUTO: 98 FL (ref 79–97)
MONOCYTES # BLD AUTO: 0.8 X10E3/UL (ref 0.1–0.9)
MONOCYTES NFR BLD AUTO: 13 %
NEUTROPHILS # BLD AUTO: 3.1 X10E3/UL (ref 1.4–7)
NEUTROPHILS NFR BLD AUTO: 47 %
PLATELET # BLD AUTO: 140 X10E3/UL (ref 150–379)
POTASSIUM SERPL-SCNC: 4.2 MMOL/L (ref 3.5–5.2)
PROT SERPL-MCNC: 7.3 G/DL (ref 6–8.5)
PSA SERPL-MCNC: 1.9 NG/ML (ref 0–4)
RBC # BLD AUTO: 4.73 X10E6/UL (ref 4.14–5.8)
SODIUM SERPL-SCNC: 139 MMOL/L (ref 134–144)
TRIGL SERPL-MCNC: 152 MG/DL (ref 0–149)
TSH SERPL DL<=0.005 MIU/L-ACNC: 2.19 UIU/ML (ref 0.45–4.5)
VLDLC SERPL CALC-MCNC: 30 MG/DL (ref 5–40)
WBC # BLD AUTO: 6.3 X10E3/UL (ref 3.4–10.8)

## 2018-06-24 RX ORDER — DULOXETIN HYDROCHLORIDE 20 MG/1
CAPSULE, DELAYED RELEASE ORAL
Qty: 30 CAP | Refills: 0 | Status: SHIPPED | OUTPATIENT
Start: 2018-06-24 | End: 2018-06-25 | Stop reason: SDUPTHER

## 2018-06-25 RX ORDER — DULOXETIN HYDROCHLORIDE 20 MG/1
20 CAPSULE, DELAYED RELEASE ORAL DAILY
Qty: 90 CAP | Refills: 1 | Status: SHIPPED | OUTPATIENT
Start: 2018-06-25 | End: 2018-07-10 | Stop reason: SINTOL

## 2018-06-25 NOTE — TELEPHONE ENCOUNTER
Orders Placed This Encounter    DULoxetine (CYMBALTA) 20 mg capsule     Sig: Take 1 Cap by mouth daily. Dispense:  90 Cap     Refill:  1     The above medication refills were approved via verbal order by Dr. Serjio Farias III.

## 2018-07-10 ENCOUNTER — HOSPITAL ENCOUNTER (OUTPATIENT)
Dept: LAB | Age: 73
Discharge: HOME | End: 2018-07-10
Payer: MEDICARE

## 2018-07-10 ENCOUNTER — OFFICE VISIT (OUTPATIENT)
Dept: INTERNAL MEDICINE CLINIC | Age: 73
End: 2018-07-10

## 2018-07-10 VITALS
RESPIRATION RATE: 10 BRPM | OXYGEN SATURATION: 95 % | WEIGHT: 268 LBS | DIASTOLIC BLOOD PRESSURE: 82 MMHG | HEIGHT: 73 IN | HEART RATE: 80 BPM | BODY MASS INDEX: 35.52 KG/M2 | SYSTOLIC BLOOD PRESSURE: 130 MMHG | TEMPERATURE: 97.7 F

## 2018-07-10 DIAGNOSIS — K92.2 LOWER GI BLEED: Primary | ICD-10-CM

## 2018-07-10 DIAGNOSIS — K51.319 ULCERATIVE RECTOSIGMOIDITIS WITH COMPLICATION (HCC): ICD-10-CM

## 2018-07-10 LAB
BASOPHILS # BLD AUTO: 0 X10E3/UL (ref 0–0.2)
BASOPHILS NFR BLD AUTO: 1 %
EOSINOPHIL # BLD AUTO: 0.2 X10E3/UL (ref 0–0.4)
EOSINOPHIL NFR BLD AUTO: 3 %
ERYTHROCYTE [DISTWIDTH] IN BLOOD BY AUTOMATED COUNT: 14 % (ref 12.3–15.4)
HCT VFR BLD AUTO: 44.4 % (ref 37.5–51)
HGB BLD-MCNC: 15.8 G/DL (ref 13–17.7)
LYMPHOCYTES # BLD AUTO: 1.6 X10E3/UL (ref 0.7–3.1)
LYMPHOCYTES NFR BLD AUTO: 27 %
MCH RBC QN AUTO: 35 PG (ref 26.6–33)
MCHC RBC AUTO-ENTMCNC: 35.6 G/DL (ref 31.5–35.7)
MCV RBC AUTO: 98 FL (ref 79–97)
MONOCYTES # BLD AUTO: 0.7 X10E3/UL (ref 0.1–0.9)
MONOCYTES NFR BLD AUTO: 12 %
NEUTROPHILS # BLD AUTO: 3.4 X10E3/UL (ref 1.4–7)
NEUTROPHILS NFR BLD AUTO: 57 %
PLATELET # BLD AUTO: 139 X10E3/UL (ref 150–379)
RBC # BLD AUTO: 4.51 X10E6/UL (ref 4.14–5.8)
WBC # BLD AUTO: 5.9 X10E3/UL (ref 3.4–10.8)

## 2018-07-10 PROCEDURE — 36415 COLL VENOUS BLD VENIPUNCTURE: CPT

## 2018-07-10 PROCEDURE — 85025 COMPLETE CBC W/AUTO DIFF WBC: CPT

## 2018-07-10 RX ORDER — DESVENLAFAXINE 25 MG/1
25 TABLET, EXTENDED RELEASE ORAL DAILY
Qty: 30 TAB | Refills: 1 | Status: SHIPPED | OUTPATIENT
Start: 2018-07-10 | End: 2019-04-24 | Stop reason: ALTCHOICE

## 2018-07-10 NOTE — PROGRESS NOTES
HPI:  Manuel Powers is a 71y.o. year old male who is here for several issues. He had an episode of rectal bleeding that occurred 3 days ago on Saturday. Seemed to have occurred with a bowel movement. His bowel movement was otherwise normal.  No pain with the bowel movement. There was blood in the toilet and on the tissue but he is unclear how much. He had a normal bowel movement yesterday and none today. No further bleeding since Saturday. His last colonoscopy was about a year ago revealing some rectal polyps but otherwise negative for active colitis. He denies any melena. Denies nausea vomiting. Denies fevers or chills. Denies change in bladder habits. He is also concerned because the duloxetine he has been taking is making him very fatigued. He does note that it is helping with his lower back pain and anxiety. Past Medical History:   Diagnosis Date    Allergic rhinitis     Arthritis     Back pain     Chronic pain lower back    plus many other joints    Hypertension     Hypogonadism male     PUD (peptic ulcer disease)     [facility]    Ulcerative colitis     Unspecified essential hypertension 1/20/2010       Past Surgical History:   Procedure Laterality Date    HX COLONOSCOPY  02/07/2017    3 yr f/u - Dr. Angeli Stroud         Prior to Admission medications    Medication Sig Start Date End Date Taking? Authorizing Provider   desvenlafaxine succinate (PRISTIQ) 25 mg ER tablet Take 1 Tab by mouth daily. 7/10/18  Yes Citlalli Reveles III, MD   atenolol (TENORMIN) 100 mg tablet TAKE ONE TABLET BY MOUTH TWICE A DAY 4/3/18  Yes Citlalli Reveles III, MD   traMADol (ULTRAM) 50 mg tablet Take 1 Tab by mouth every six (6) hours as needed for Pain. Max Daily Amount: 200 mg. 11/10/17  Yes Citlalli Reveles III, MD   acetaminophen (TYLENOL) 325 mg tablet Take 2 Tabs by mouth every six (6) hours as needed for Pain.  11/10/17 Yes Preethi Moore III, MD   PSYLLIUM SEED, WITH DEXTROSE, (FIBER PO) Take  by mouth. Yes Historical Provider   MULTIVITAMIN PO Take  by mouth. Takes one po daily. Yes Historical Provider       Social History     Social History    Marital status:      Spouse name: N/A    Number of children: N/A    Years of education: N/A     Occupational History    Not on file. Social History Main Topics    Smoking status: Former Smoker     Years: 15.00     Quit date: 1/1/1995    Smokeless tobacco: Never Used      Comment: quit smoking cigarettes 20 yrs ago    Alcohol use 3.6 oz/week     7 drink(s) per week    Drug use: No    Sexual activity: No     Other Topics Concern    Not on file     Social History Narrative          ROS  Has change the timing of his duloxetine on several occasions with little change in fatigue. Visit Vitals    /82    Pulse 80    Temp 97.7 °F (36.5 °C) (Oral)    Resp 10    Ht 6' 1\" (1.854 m)    Wt 268 lb (121.6 kg)    SpO2 95%    BMI 35.36 kg/m2         Physical Exam   Physical Examination: General appearance - alert, well appearing, and in no distress  Mouth - mucous membranes moist, pharynx normal without lesions  Chest - clear to auscultation, no wheezes, rales or rhonchi, symmetric air entry  Heart - normal rate, regular rhythm, normal S1, S2, no murmurs, rubs, clicks or gallops  Abdomen - soft, nontender, nondistended, no masses or organomegaly  Neurological - alert, oriented, normal speech, no focal findings or movement disorder noted  Extremities - peripheral pulses normal, no pedal edema, no clubbing or cyanosis      Assessment/Plan:  Diagnoses and all orders for this visit:    1. Lower GI bleed -question teresa related to rectal fissure versus hemorrhoid or recurrent colitis. At this point will get a CBC to look for a drop in his hemoglobin. He will let me know if he develops any recurrent bleeding, fever, melena, or vomiting.   Will help arrange an urgent visit with GI to undergo a flexible sigmoidoscopy to evaluate for causes of the bleeding.  -     CBC WITH AUTOMATED DIFF  -     REFERRAL TO GASTROENTEROLOGY    2. Ulcerative rectosigmoiditis with complication ([facility] -per above. -     CBC WITH AUTOMATED DIFF  -     REFERRAL TO GASTROENTEROLOGY  3. Chronic lumbar radiculopathy with underlying anxiety-will taper off his duloxetine. We will have him start using Pristiq 25 mg a day. He is aware that this is an off label use to help with pain but should help with anxiety. He will let me know in 2-3 weeks how this is going. 4.  Situational hypertension-stable after he was called today in the office. Other orders  -     desvenlafaxine succinate (PRISTIQ) 25 mg ER tablet; Take 1 Tab by mouth daily. Follow-up Disposition: 1 month. Advised him to call back or return to office if symptoms worsen/change/persist.  Discussed expected course/resolution/complications of diagnosis in detail with patient. Medication risks/benefits/costs/interactions/alternatives discussed with patient. He was given an after visit summary which includes diagnoses, current medications, & vitals. He expressed understanding with the diagnosis and plan.

## 2018-08-03 RX ORDER — ATENOLOL 100 MG/1
TABLET ORAL
Qty: 180 TAB | Refills: 1 | Status: SHIPPED | OUTPATIENT
Start: 2018-08-03 | End: 2019-01-29 | Stop reason: SDUPTHER

## 2018-08-03 NOTE — TELEPHONE ENCOUNTER
Orders Placed This Encounter    atenolol (TENORMIN) 100 mg tablet     Sig: TAKE ONE TABLET BY MOUTH TWICE A DAY     Dispense:  180 Tab     Refill:  1     The above medication refills were approved via verbal order by Dr. Goldie Samuels III.

## 2018-08-29 DIAGNOSIS — M47.26 OSTEOARTHRITIS OF SPINE WITH RADICULOPATHY, LUMBAR REGION: ICD-10-CM

## 2018-08-29 RX ORDER — TRAMADOL HYDROCHLORIDE 50 MG/1
TABLET ORAL
Qty: 40 TAB | Refills: 0 | OUTPATIENT
Start: 2018-08-29 | End: 2019-04-24 | Stop reason: SDUPTHER

## 2018-08-30 NOTE — TELEPHONE ENCOUNTER
Orders Placed This Encounter    traMADol (ULTRAM) 50 mg tablet     Sig: TAKE ONE TABLET BY MOUTH EVERY 6 HOURS AS NEEDED FOR PAIN (MAX DAILY AMOUNT IS 200MG)     Dispense:  40 Tab     Refill:  0     The above controlled substance refill was called into patients pharmacy - [facility] - authorized by Dr. Rajwinder Huffman.

## 2018-10-15 ENCOUNTER — CLINICAL SUPPORT (OUTPATIENT)
Dept: INTERNAL MEDICINE CLINIC | Age: 73
End: 2018-10-15

## 2018-10-15 DIAGNOSIS — Z23 ENCOUNTER FOR IMMUNIZATION: Primary | ICD-10-CM

## 2019-01-29 RX ORDER — ATENOLOL 100 MG/1
TABLET ORAL
Qty: 180 TAB | Refills: 1 | Status: SHIPPED | OUTPATIENT
Start: 2019-01-29 | End: 2019-07-24 | Stop reason: SDUPTHER

## 2019-04-24 ENCOUNTER — OFFICE VISIT (OUTPATIENT)
Dept: INTERNAL MEDICINE CLINIC | Age: 74
End: 2019-04-24

## 2019-04-24 VITALS
WEIGHT: 276 LBS | HEART RATE: 84 BPM | DIASTOLIC BLOOD PRESSURE: 84 MMHG | HEIGHT: 73 IN | OXYGEN SATURATION: 94 % | SYSTOLIC BLOOD PRESSURE: 150 MMHG | TEMPERATURE: 98.4 F | RESPIRATION RATE: 14 BRPM | BODY MASS INDEX: 36.58 KG/M2

## 2019-04-24 DIAGNOSIS — Z00.00 MEDICARE ANNUAL WELLNESS VISIT, SUBSEQUENT: Primary | ICD-10-CM

## 2019-04-24 DIAGNOSIS — E55.9 VITAMIN D DEFICIENCY: ICD-10-CM

## 2019-04-24 DIAGNOSIS — M79.10 MYALGIA: ICD-10-CM

## 2019-04-24 DIAGNOSIS — E29.1 HYPOGONADISM MALE: ICD-10-CM

## 2019-04-24 DIAGNOSIS — M47.26 OSTEOARTHRITIS OF SPINE WITH RADICULOPATHY, LUMBAR REGION: ICD-10-CM

## 2019-04-24 DIAGNOSIS — I10 ESSENTIAL HYPERTENSION: ICD-10-CM

## 2019-04-24 DIAGNOSIS — D64.9 ANEMIA, UNSPECIFIED TYPE: ICD-10-CM

## 2019-04-24 DIAGNOSIS — M54.40 LOW BACK PAIN WITH SCIATICA, SCIATICA LATERALITY UNSPECIFIED, UNSPECIFIED BACK PAIN LATERALITY, UNSPECIFIED CHRONICITY: ICD-10-CM

## 2019-04-24 DIAGNOSIS — Z12.5 PROSTATE CANCER SCREENING: ICD-10-CM

## 2019-04-24 RX ORDER — CHOLECALCIFEROL TAB 125 MCG (5000 UNIT) 125 MCG
TAB ORAL DAILY
COMMUNITY

## 2019-04-24 RX ORDER — TRAMADOL HYDROCHLORIDE 50 MG/1
50 TABLET ORAL
Qty: 40 TAB | Refills: 0 | Status: SHIPPED | OUTPATIENT
Start: 2019-04-24 | End: 2019-05-24

## 2019-04-24 RX ORDER — CELECOXIB 200 MG/1
200 CAPSULE ORAL 2 TIMES DAILY
Qty: 60 CAP | Refills: 2 | Status: SHIPPED | OUTPATIENT
Start: 2019-04-24 | End: 2019-09-12 | Stop reason: SDUPTHER

## 2019-04-24 RX ORDER — PANTOPRAZOLE SODIUM 40 MG/1
40 TABLET, DELAYED RELEASE ORAL DAILY
Qty: 30 TAB | Refills: 5 | Status: SHIPPED | OUTPATIENT
Start: 2019-04-24 | End: 2020-08-05

## 2019-04-24 NOTE — PROGRESS NOTES
This is the Subsequent Medicare Annual Wellness Exam, performed 12 months or more after the Initial AWV or the last Subsequent AWV I have reviewed the patient's medical history in detail and updated the computerized patient record. As well as a follow-up visit for his health issues. He has had ongoing issues with fatigue. He notes ongoing pain across his lower back particularly after standing for a while. He has discomfort that goes down his legs with weakness in both legs. He has had no falls. No numbness or tingling. Some chronic bowel and bladder urgency and occasional bowel and bladder incontinence. He is tried to increase the fiber in his diet and is made some difference with his bowel movements. Denies any bleeding. He is up-to-date on colonoscopies. The past he is tried multiple medications for both his back and depression. Cymbalta was not tolerated. Meloxicam caused GI upset, injections have made very little difference. History Past Medical History:  
Diagnosis Date  Allergic rhinitis  Arthritis  Back pain  Chronic pain lower back  
 plus many other joints  Hypertension  Hypogonadism male  PUD (peptic ulcer disease)  UC (ulcerative colitis) ([facility] 1/20/2010  Ulcerative colitis  Unspecified essential hypertension 1/20/2010 Past Surgical History:  
Procedure Laterality Date  HX COLONOSCOPY  02/07/2017  
 3 yr f/u - Dr. Lindsey Quiet  HX TONSILLECTOMY  HX WISDOM TEETH EXTRACTION Current Outpatient Medications Medication Sig Dispense Refill  cyanocobalamin, vitamin B-12, (VITAMIN B12 PO) Take  by mouth.  cholecalciferol, VITAMIN D3, (VITAMIN D3) 5,000 unit tab tablet Take  by mouth daily.  traMADol (ULTRAM) 50 mg tablet Take 1 Tab by mouth every eight (8) hours as needed for Pain for up to 30 days.  Max Daily Amount: 150 mg. 40 Tab 0  
 celecoxib (CELEBREX) 200 mg capsule Take 1 Cap by mouth two (2) times a day for 90 days. 60 Cap 2  pantoprazole (PROTONIX) 40 mg tablet Take 1 Tab by mouth daily. 30 Tab 5  
 atenolol (TENORMIN) 100 mg tablet TAKE ONE TABLET BY MOUTH TWICE A  Tab 1  
 PSYLLIUM SEED, WITH DEXTROSE, (FIBER PO) Take  by mouth.  MULTIVITAMIN PO Take  by mouth. Takes one po daily. Allergies Allergen Reactions  No Known Allergies Other (comments) Family History Problem Relation Age of Onset  Cancer Mother   
     ovarian,colon  Diabetes Sister Social History Tobacco Use  Smoking status: Former Smoker Years: 15.00 Last attempt to quit: 1995 Years since quittin.3  Smokeless tobacco: Never Used  Tobacco comment: quit smoking cigarettes 20 yrs ago Substance Use Topics  Alcohol use: Yes Alcohol/week: 3.6 oz Types: 7 Standard drinks or equivalent per week Frequency: 4 or more times a week Drinks per session: 3 or 4 Binge frequency: Never Patient Active Problem List  
Diagnosis Code  Hypogonadism male E29.1  Essential hypertension I10  
 [facility] K51.90  Anemia D64.9  
 DJD (degenerative joint disease), lumbar M47.816  Open angle glaucoma suspect [id number]  Advance directive on file O53.8  Severe obesity (BMI 35.0-39. 9) with comorbidity ([facility] E66.01  
ROS - Per HPI Physical Examination: General appearance - alert, well appearing, and in no distress Eyes - pupils equal and reactive, extraocular eye movements intact Ears - bilateral TM's and external ear canals normal 
Nose - normal and patent, no erythema, discharge or polyps Mouth - mucous membranes moist, pharynx normal without lesions Neck - supple, no significant adenopathy Lymphatics - no palpable lymphadenopathy, no hepatosplenomegaly Chest - clear to auscultation, no wheezes, rales or rhonchi, symmetric air entry Heart - normal rate, regular rhythm, normal S1, S2, no murmurs, rubs, clicks or gallops Abdomen - soft, nontender, nondistended, no masses or organomegaly Back exam - limited range of motion, pain with motion noted during exam, tenderness noted along the lower back muscles. , negative straight-leg raise bilaterally at 45 degrees bilaterally. , normal reflexes and strength bilateral lower extremities Neurological - alert, oriented, normal speech, no focal findings or movement disorder noted, motor and sensory grossly normal bilaterally Musculoskeletal - no joint tenderness, deformity or swelling Extremities - peripheral pulses normal, no pedal edema, no clubbing or cyanosis Depression Risk Factor Screening:  
 
3 most recent PHQ Screens 4/24/2019 Little interest or pleasure in doing things Not at all Feeling down, depressed, irritable, or hopeless Not at all Total Score PHQ 2 0 Alcohol Risk Factor Screening: You average more than 14 drinks a week. Functional Ability and Level of Safety:  
Hearing Loss Hearing is good. Activities of Daily Living The home contains: no safety equipment. Patient does total self care Fall Risk Fall Risk Assessment, last 12 mths 4/24/2019 Able to walk? Yes Fall in past 12 months? Yes Fall with injury? No  
Number of falls in past 12 months 1 Fall Risk Score 1 Abuse Screen Patient is not abused Cognitive Screening Evaluation of Cognitive Function: 
Has your family/caregiver stated any concerns about your memory: no 
 
 
Patient Care Team  
Patient Care Team: 
Alfonso Carson MD as PCP - General 
Lon Gonzalez MD (Gastroenterology) Kristopher De La Cruz MD (Ophthalmology) Assessment/Plan Education and counseling provided: 
Are appropriate based on today's review and evaluation Prostate cancer screening tests (PSA, covered annually) Diabetes screening test 
 
Diagnoses and all orders for this visit: 
 
1. Hypogonadism male -currently on no treatment. 2. Osteoarthritis of spine with radiculopathy, lumbar region-previously failed multiple interventions. His previous MRI did not reveal severe spinal stenosis. At this point will try another anti-inflammatory and see if it is helpful with medication to protect his stomach. If lab work is normal for muscle enzymes, vitamins, and electrolytes, will consider nerve conduction velocity of the legs to evaluate for neuropathy. -     traMADol (ULTRAM) 50 mg tablet; Take 1 Tab by mouth every eight (8) hours as needed for Pain for up to 30 days. Max Daily Amount: 150 mg. 
 
3. Anemia, unspecified type -likely related to inflammatory bowel disease. Will repeat labs. -     CBC WITH AUTOMATED DIFF 
-     VITAMIN B12 & FOLATE 4. Myalgia 
-     CK 5. Vitamin D deficiency -repeat levels to be sure this is corrected. -     VITAMIN D, 25 HYDROXY 6. Prostate cancer screening -     PSA SCREENING (SCREENING) 7. Essential hypertension-reasonable blood pressure control today. Will continue current meds. -     METABOLIC PANEL, COMPREHENSIVE 
-     LIPID PANEL 
-     TSH RFX ON ABNORMAL TO FREE T4 
 
8. Low back pain with sciatica, sciatica laterality unspecified, unspecified back pain laterality, unspecified chronicity 9. Medicare annual wellness visit, subsequent Other orders 
-     celecoxib (CELEBREX) 200 mg capsule; Take 1 Cap by mouth two (2) times a day for 90 days. -     pantoprazole (PROTONIX) 40 mg tablet; Take 1 Tab by mouth daily. Health Maintenance Due Topic Date Due  MEDICARE YEARLY EXAM  04/21/2019

## 2019-04-24 NOTE — PATIENT INSTRUCTIONS
Medicare Wellness Visit, Male The best way to live healthy is to have a lifestyle where you eat a well-balanced diet, exercise regularly, limit alcohol use, and quit all forms of tobacco/nicotine, if applicable. Regular preventive services are another way to keep healthy. Preventive services (vaccines, screening tests, monitoring & exams) can help personalize your care plan, which helps you manage your own care. Screening tests can find health problems at the earliest stages, when they are easiest to treat. [facility] follows the current, evidence-based guidelines published by the [facility] ([facility]) when recommending preventive services for our patients. Because we follow these guidelines, sometimes recommendations change over time as research supports it. (For example, a prostate screening blood test is no longer routinely recommended for men with no symptoms.) Of course, you and your doctor may decide to screen more often for some diseases, based on your risk and co-morbidities (chronic disease you are already diagnosed with). Preventive services for you include: - Medicare offers their members a free annual wellness visit, which is time for you and your primary care provider to discuss and plan for your preventive service needs. Take advantage of this benefit every year! 
-All adults over age 72 should receive the recommended pneumonia vaccines. Current USPSTF guidelines recommend a series of two vaccines for the best pneumonia protection.  
-All adults should have a flu vaccine yearly and an ECG.  All adults age 61 and older should receive a shingles vaccine once in their lifetime.   
-All adults age 38-68 who are overweight should have a diabetes screening test once every three years.  
-Other screening tests & preventive services for persons with diabetes include: an eye exam to screen for diabetic retinopathy, a kidney function test, a foot exam, and stricter control over your cholesterol.  
-Cardiovascular screening for adults with routine risk involves an electrocardiogram (ECG) at intervals determined by the provider.  
-Colorectal cancer screening should be done for adults age 54-65 with no increased risk factors for colorectal cancer. There are a number of acceptable methods of screening for this type of cancer. Each test has its own benefits and drawbacks. Discuss with your provider what is most appropriate for you during your annual wellness visit. The different tests include: colonoscopy (considered the best screening method), a fecal occult blood test, a fecal DNA test, and sigmoidoscopy. 
-All adults born between [facility] should be screened once for Hepatitis C. 
-An Abdominal Aortic Aneurysm (AAA) Screening is recommended for men age 73-68 who has ever smoked in their lifetime. Here is a list of your current Health Maintenance items (your personalized list of preventive services) with a due date: 
Health Maintenance Due Topic Date Due  
 Annual Well Visit  04/21/2019

## 2019-04-25 ENCOUNTER — TELEPHONE (OUTPATIENT)
Dept: INTERNAL MEDICINE CLINIC | Age: 74
End: 2019-04-25

## 2019-04-25 ENCOUNTER — HOSPITAL ENCOUNTER (OUTPATIENT)
Dept: LAB | Age: 74
Discharge: HOME | End: 2019-04-25
Payer: MEDICARE

## 2019-04-25 PROCEDURE — 85025 COMPLETE CBC W/AUTO DIFF WBC: CPT

## 2019-04-25 PROCEDURE — 84153 ASSAY OF PSA TOTAL: CPT

## 2019-04-25 PROCEDURE — 82550 ASSAY OF CK (CPK): CPT

## 2019-04-25 PROCEDURE — 84443 ASSAY THYROID STIM HORMONE: CPT

## 2019-04-25 PROCEDURE — 82306 VITAMIN D 25 HYDROXY: CPT

## 2019-04-25 PROCEDURE — 82607 VITAMIN B-12: CPT

## 2019-04-25 PROCEDURE — 36415 COLL VENOUS BLD VENIPUNCTURE: CPT

## 2019-04-25 PROCEDURE — 80061 LIPID PANEL: CPT

## 2019-04-25 PROCEDURE — 80053 COMPREHEN METABOLIC PANEL: CPT

## 2019-04-26 LAB
25(OH)D3+25(OH)D2 SERPL-MCNC: 65.6 NG/ML (ref 30–100)
ALBUMIN SERPL-MCNC: 4.5 G/DL (ref 3.5–4.8)
ALBUMIN/GLOB SERPL: 2 {RATIO} (ref 1.2–2.2)
ALP SERPL-CCNC: 68 IU/L (ref 39–117)
ALT SERPL-CCNC: 30 IU/L (ref 0–44)
AST SERPL-CCNC: 31 IU/L (ref 0–40)
BASOPHILS # BLD AUTO: 0 X10E3/UL (ref 0–0.2)
BASOPHILS NFR BLD AUTO: 1 %
BILIRUB SERPL-MCNC: 0.6 MG/DL (ref 0–1.2)
BUN SERPL-MCNC: 18 MG/DL (ref 8–27)
BUN/CREAT SERPL: 18 (ref 10–24)
CALCIUM SERPL-MCNC: 9.7 MG/DL (ref 8.6–10.2)
CHLORIDE SERPL-SCNC: 105 MMOL/L (ref 96–106)
CHOLEST SERPL-MCNC: 202 MG/DL (ref 100–199)
CK SERPL-CCNC: 60 U/L (ref 24–204)
CO2 SERPL-SCNC: 26 MMOL/L (ref 20–29)
CREAT SERPL-MCNC: 1.02 MG/DL (ref 0.76–1.27)
EOSINOPHIL # BLD AUTO: 0.2 X10E3/UL (ref 0–0.4)
EOSINOPHIL NFR BLD AUTO: 5 %
ERYTHROCYTE [DISTWIDTH] IN BLOOD BY AUTOMATED COUNT: 14.4 % (ref 12.3–15.4)
FOLATE SERPL-MCNC: 16.6 NG/ML
GLOBULIN SER CALC-MCNC: 2.3 G/DL (ref 1.5–4.5)
GLUCOSE SERPL-MCNC: 133 MG/DL (ref 65–99)
HCT VFR BLD AUTO: 44.6 % (ref 37.5–51)
HDLC SERPL-MCNC: 45 MG/DL
HGB BLD-MCNC: 15.2 G/DL (ref 13–17.7)
IMM GRANULOCYTES # BLD AUTO: 0 X10E3/UL (ref 0–0.1)
IMM GRANULOCYTES NFR BLD AUTO: 0 %
LDLC SERPL CALC-MCNC: 130 MG/DL (ref 0–99)
LYMPHOCYTES # BLD AUTO: 1.8 X10E3/UL (ref 0.7–3.1)
LYMPHOCYTES NFR BLD AUTO: 33 %
MCH RBC QN AUTO: 35.5 PG (ref 26.6–33)
MCHC RBC AUTO-ENTMCNC: 34.1 G/DL (ref 31.5–35.7)
MCV RBC AUTO: 104 FL (ref 79–97)
MONOCYTES # BLD AUTO: 0.7 X10E3/UL (ref 0.1–0.9)
MONOCYTES NFR BLD AUTO: 12 %
NEUTROPHILS # BLD AUTO: 2.7 X10E3/UL (ref 1.4–7)
NEUTROPHILS NFR BLD AUTO: 49 %
PLATELET # BLD AUTO: 151 X10E3/UL (ref 150–379)
POTASSIUM SERPL-SCNC: 4.6 MMOL/L (ref 3.5–5.2)
PROT SERPL-MCNC: 6.8 G/DL (ref 6–8.5)
PSA SERPL-MCNC: 1.8 NG/ML (ref 0–4)
RBC # BLD AUTO: 4.28 X10E6/UL (ref 4.14–5.8)
SODIUM SERPL-SCNC: 146 MMOL/L (ref 134–144)
TRIGL SERPL-MCNC: 133 MG/DL (ref 0–149)
TSH SERPL DL<=0.005 MIU/L-ACNC: 1.6 UIU/ML (ref 0.45–4.5)
VIT B12 SERPL-MCNC: 427 PG/ML (ref 232–1245)
VLDLC SERPL CALC-MCNC: 27 MG/DL (ref 5–40)
WBC # BLD AUTO: 5.4 X10E3/UL (ref 3.4–10.8)

## 2019-07-24 RX ORDER — ATENOLOL 100 MG/1
TABLET ORAL
Qty: 180 TAB | Refills: 0 | Status: SHIPPED | OUTPATIENT
Start: 2019-07-24 | End: 2020-07-09 | Stop reason: SDUPTHER

## 2019-09-12 RX ORDER — CELECOXIB 200 MG/1
CAPSULE ORAL
Qty: 60 CAP | Refills: 1 | Status: SHIPPED | OUTPATIENT
Start: 2019-09-12 | End: 2020-04-20

## 2019-10-25 RX ORDER — ATENOLOL 100 MG/1
TABLET ORAL
Qty: 180 TAB | Refills: 0 | Status: SHIPPED | OUTPATIENT
Start: 2019-10-25 | End: 2020-01-22

## 2020-01-22 RX ORDER — ATENOLOL 100 MG/1
TABLET ORAL
Qty: 180 TAB | Refills: 0 | Status: SHIPPED | OUTPATIENT
Start: 2020-01-22 | End: 2020-04-20

## 2020-04-20 RX ORDER — ATENOLOL 100 MG/1
TABLET ORAL
Qty: 180 TAB | Refills: 0 | Status: SHIPPED | OUTPATIENT
Start: 2020-04-20 | End: 2020-07-09 | Stop reason: SDUPTHER

## 2020-04-20 RX ORDER — CELECOXIB 200 MG/1
CAPSULE ORAL
Qty: 60 CAP | Refills: 0 | Status: ON HOLD | OUTPATIENT
Start: 2020-04-20 | End: 2020-07-27

## 2020-05-26 ENCOUNTER — TELEPHONE (OUTPATIENT)
Dept: CARDIOLOGY CLINIC | Age: 75
End: 2020-05-26

## 2020-05-26 LAB
CREATININE, EXTERNAL: 0.95
SARS-COV-2, NAA: NOT DETECTED

## 2020-05-26 NOTE — TELEPHONE ENCOUNTER
Patient's wife states she took patient to [facility] today for a surgery he is to have tomorrow. She states they did 2 EKG's that showed what they is AFIB. Patient was told to see a cardiologist in order to proceed with surgery. Wife states she has a VV with Dr. Dockery today in the afternoon and was wondering if she can squeeze patient in because his surgery is important.      [address] can be reached at  [phone]

## 2020-05-26 NOTE — TELEPHONE ENCOUNTER
Pt wife called back to let you know that his surg has been postpone until he see's the Dr and get cleared.  As soon as you can

## 2020-05-26 NOTE — TELEPHONE ENCOUNTER
Returned call to spouse, 2 pt identifiers used    Scheduled patient a NP appt for clearance. EKG has been requested from pre-admission.  ([phone])    Future Appointments   Date Time Provider Loco Castro   6/3/2020  8:40 AM Ira Lombardi  [address]   6/17/2020  2:30 PM Tony Carcamo MD [id number] [facility]

## 2020-05-28 ENCOUNTER — DOCUMENTATION ONLY (OUTPATIENT)
Dept: CARDIOLOGY CLINIC | Age: 75
End: 2020-05-28

## 2020-05-28 NOTE — PROGRESS NOTES
New patient visit 6/3/20  ECG received from [facility] scanned into [facility]  ECG dated 5/26/20 - AFib 73 bpm, RBBB

## 2020-06-03 ENCOUNTER — OFFICE VISIT (OUTPATIENT)
Dept: CARDIOLOGY CLINIC | Age: 75
End: 2020-06-03

## 2020-06-03 VITALS
HEART RATE: 72 BPM | DIASTOLIC BLOOD PRESSURE: 90 MMHG | BODY MASS INDEX: 36.45 KG/M2 | WEIGHT: 275 LBS | HEIGHT: 73 IN | SYSTOLIC BLOOD PRESSURE: 150 MMHG | RESPIRATION RATE: 16 BRPM | OXYGEN SATURATION: 98 %

## 2020-06-03 DIAGNOSIS — R06.02 SHORTNESS OF BREATH: ICD-10-CM

## 2020-06-03 DIAGNOSIS — R53.83 FATIGUE, UNSPECIFIED TYPE: ICD-10-CM

## 2020-06-03 DIAGNOSIS — I10 ESSENTIAL HYPERTENSION: Primary | ICD-10-CM

## 2020-06-03 DIAGNOSIS — R42 DIZZINESS: ICD-10-CM

## 2020-06-03 DIAGNOSIS — E66.9 OBESITY, CLASS II, BMI 35-39.9, NO COMORBIDITY: ICD-10-CM

## 2020-06-03 DIAGNOSIS — I48.91 ATRIAL FIBRILLATION, UNSPECIFIED TYPE (HCC): ICD-10-CM

## 2020-06-03 DIAGNOSIS — K51.919 ULCERATIVE COLITIS WITH COMPLICATION, UNSPECIFIED LOCATION (HCC): ICD-10-CM

## 2020-06-03 NOTE — PROGRESS NOTES
[facility] [place]  [phone]  Requesting/referring provider: Dr. Joe Agarwal, Dr. Alice Walter  Reason for Consult: afib    HPI: Nathan Moses, a 69y.o. year-old who presents for evaluation of afib, rbbb. He went to preop testing and they found afib. He has been feeling tired ill and wiped out for the last year. Needs removal of large intestine for polyps. He uses a cane for balance. He has a stationary bike and rides that daily. He feels fine with that. He rides it 15 miles a day. He also thinks the atenolol makes him feel tired and wiped out. Some dizziness no passing out. NO edema. No chest pain or pressure/aching. Occsasional pinprick feeling in the chest that is a second or less. Surgeon is Dr Alice Walter. At this time he may proceed with srugery as scheduled. Will readdress afib postop. Would not start NOAC right now given Etoh and surgery. Rediscuss risks and benefits of NOAC postop. Also May attempt [facility] given new diagnosis of afib, but not necessary preop given lck of sx directly attributable to afib    Postop would try reducing atenolol dose to see if it helps afib, and DCC. Trial of NOAC and counseled on etoh cessation. RVE slightly on echo today, consider DILLON evaluation as well. Assessment/Plan:  1. HTN a bit high today on atenolo, may need to adjust for side effecs but getting ready for surgery. 2. Afib- rate controlled. 3. Body mass index is 36.28 kg/m². 4. Dyslipidemia  5. UC- needs colon resection,   6. Preop clearance ok to proceed then will readdress afib afterwards.    7. Etoh use 1/2 L a day, recommended he cut own     REmote hx tocbacco   Fhx sister with pacemaker, uncle with pacer  He  has a past medical history of Allergic rhinitis, Arthritis, Back pain, Chronic pain (lower back), History of basal cell carcinoma (BCC) of skin (07/01/2019), Hypertension, Hypogonadism male, PUD (peptic ulcer disease), UC (ulcerative colitis) ([facility] (1/20/2010), Ulcerative colitis, and Unspecified essential hypertension (1/20/2010). Cardiovascular ROS: positive for - dyspnea on exertion  Respiratory ROS: no cough, shortness of breath, or wheezing  Neurological ROS: no TIA or stroke symptoms  All other systems negative except as above. PE  Vitals:    06/03/20 0841   BP: 150/90   Pulse: 72   Resp: 16   SpO2: 98%   Weight: 275 lb (124.7 kg)   Height: 6' 1\" (1.854 m)    Body mass index is 36.28 kg/m².    General appearance - alert, well appearing, and in no distress   Mental status - affect appropriate to mood  Eyes - sclera anicteric, moist mucous membranes  Neck - supple, no significant adenopathy  Lymphatics - no  lymphadenopathy  Chest - clear to auscultation, no wheezes, rales or rhonchi  Heart - normal rate, irregular rhythm, normal S1, S2, no murmurs, rubs, clicks or gallops  Abdomen - soft, nontender, nondistended, no masses or organomegaly  Back exam - full range of motion, no tenderness  Neurological - cranial nerves II through XII grossly intact, no focal deficit  Musculoskeletal - no muscular tenderness noted, normal strength  Extremities - peripheral pulses normal, no pedal edema  Skin - normal coloration  no rashes    Recent Labs:  Lab Results   Component Value Date/Time    Cholesterol, total 202 (H) 04/25/2019 09:01 AM    HDL Cholesterol 45 04/25/2019 09:01 AM    LDL, calculated 130 (H) 04/25/2019 09:01 AM    Triglyceride 133 04/25/2019 09:01 AM     Lab Results   Component Value Date/Time    Creatinine 1.02 04/25/2019 09:01 AM     Lab Results   Component Value Date/Time    BUN 18 04/25/2019 09:01 AM     Lab Results   Component Value Date/Time    Potassium 4.6 04/25/2019 09:01 AM     Lab Results   Component Value Date/Time    Hemoglobin A1c 5.2 11/22/2017 09:45 AM     Lab Results   Component Value Date/Time    HGB 15.2 04/25/2019 09:01 AM     Lab Results   Component Value Date/Time    PLATELET 829 83/86/0314 09:01 AM       Reviewed:  Past Medical History:   Diagnosis Date    Allergic rhinitis     Arthritis     Back pain     Chronic pain lower back    plus many other joints    History of basal cell carcinoma (BCC) of skin 2019    left side of face    Hypertension     Hypogonadism male     PUD (peptic ulcer disease)     [facility]ulcerative [facility]) ([facility] 2010    Ulcerative colitis     Unspecified essential hypertension 2010     Social History     Tobacco Use   Smoking Status Former Smoker    Years: 15.00    Last attempt to quit: 1995    Years since quittin.4   Smokeless Tobacco Never Used   Tobacco Comment    quit smoking cigarettes 20 yrs ago     Social History     Substance and Sexual Activity   Alcohol Use Yes    Alcohol/week: 6.0 standard drinks    Types: 7 Standard drinks or equivalent per week    Frequency: 4 or more times a week    Drinks per session: 3 or 4    Binge frequency: Never     Allergies   Allergen Reactions    No Known Allergies Other (comments)       Current Outpatient Medications   Medication Sig    atenolol (TENORMIN) 100 mg tablet TAKE ONE TABLET BY MOUTH TWICE A DAY    cyanocobalamin, vitamin B-12, (VITAMIN B12 PO) Take  by mouth.  cholecalciferol, VITAMIN D3, (VITAMIN D3) 5,000 unit tab tablet Take  by mouth daily.  PSYLLIUM SEED, WITH DEXTROSE, (FIBER PO) Take  by mouth.  MULTIVITAMIN PO Take  by mouth. Takes one po daily.  celecoxib (CELEBREX) 200 mg capsule TAKE ONE CAPSULE BY MOUTH TWICE A DAY    atenoloL (TENORMIN) 100 mg tablet TAKE ONE TABLET BY MOUTH TWICE A DAY    pantoprazole (PROTONIX) 40 mg tablet Take 1 Tab by mouth daily.  methylcellulose (CITRUCEL) 500 mg tablet Take 2 Tabs by mouth daily. No current facility-administered medications for this visit.         Sera Piedra MD  [facility] [place]  [address] [facility] [address]  [address]

## 2020-06-03 NOTE — PATIENT INSTRUCTIONS
Surgeon Atul Godinez [facility],  [phone] [facility] for her return call regarding clearance and when patient can be rescheduled.

## 2020-06-23 LAB — SARS-COV-2, NAA: NOT DETECTED

## 2020-07-08 ENCOUNTER — TELEPHONE (OUTPATIENT)
Dept: CARDIOLOGY CLINIC | Age: 75
End: 2020-07-08

## 2020-07-09 ENCOUNTER — OFFICE VISIT (OUTPATIENT)
Dept: CARDIOLOGY CLINIC | Age: 75
End: 2020-07-09

## 2020-07-09 VITALS
DIASTOLIC BLOOD PRESSURE: 80 MMHG | SYSTOLIC BLOOD PRESSURE: 118 MMHG | OXYGEN SATURATION: 91 % | BODY MASS INDEX: 34.78 KG/M2 | HEART RATE: 78 BPM | WEIGHT: 271 LBS | HEIGHT: 74 IN

## 2020-07-09 DIAGNOSIS — Z01.818 PRE-OP TESTING: ICD-10-CM

## 2020-07-09 DIAGNOSIS — R53.83 FATIGUE, UNSPECIFIED TYPE: ICD-10-CM

## 2020-07-09 DIAGNOSIS — R06.02 SHORTNESS OF BREATH: ICD-10-CM

## 2020-07-09 DIAGNOSIS — R42 DIZZINESS: ICD-10-CM

## 2020-07-09 DIAGNOSIS — I48.91 ATRIAL FIBRILLATION, UNSPECIFIED TYPE (HCC): Primary | ICD-10-CM

## 2020-07-09 DIAGNOSIS — I10 ESSENTIAL HYPERTENSION: ICD-10-CM

## 2020-07-09 DIAGNOSIS — K51.919 ULCERATIVE COLITIS WITH COMPLICATION, UNSPECIFIED LOCATION (HCC): ICD-10-CM

## 2020-07-09 DIAGNOSIS — E66.9 OBESITY, CLASS II, BMI 35-39.9, NO COMORBIDITY: ICD-10-CM

## 2020-07-09 RX ORDER — AMLODIPINE BESYLATE 5 MG/1
5 TABLET ORAL DAILY
COMMUNITY
End: 2020-07-29 | Stop reason: SDUPTHER

## 2020-07-09 RX ORDER — NEBIVOLOL 20 MG/1
20 TABLET ORAL DAILY
Qty: 90 TAB | Refills: 3 | Status: SHIPPED | OUTPATIENT
Start: 2020-07-09 | End: 2020-08-05 | Stop reason: SDUPTHER

## 2020-07-09 NOTE — PROGRESS NOTES
[address] [address]: Addy Rea  [phone]  Requesting/referring provider: Dr. Rhina Peralta, Dr. oH Her  Reason for Consult: afib    HPI: Jasbir Acosta, a 69y.o. year-old who presents for evaluation of afib, rbbb. He went to preop testing and they found afib. He has been feeling tired ill and wiped out for the last year. Needs removal of large intestine for polyps. He uses a cane for balance. He has a stationary bike and rides that daily. He feels fine with that. He rides it 15 miles a day. He also thinks the atenolol makes him feel tired and wiped out. Some dizziness no passing out. NO edema. No chest pain or pressure/aching. Occsasional pinprick feeling in the chest that is a second or less. Surgeon is Dr Georgia Saenz. At this time he may proceed with srugery as scheduled. Will readdress afib postop. Would not start NOAC right now given Etoh and surgery. Rediscuss risks and benefits of NOAC postop. Also May attempt [facility] given new diagnosis of afib, but not necessary preop given lck of sx directly attributable to afib    Postop would try reducing atenolol dose to see if it helps afib, and DCC. Trial of NOAC and counseled on etoh cessation. RVE slightly on echo today, consider DILLON evaluation as well. Surgery went ok, he is feeling about the same. NO chest pain, no dyspnea, has minimal pain. No fevers, chills or night sweats. Today will start Eliquis at 5mg po BID plan for cardioversion in 3 weeks. No hx of varices on EGD 2-3 years ago as far as he knows. Assessment/Plan:  1. HTN at goal , will trial bystolic if able to help with fatigue. 2. Afib- rate controlled.   -plan for MARYCRUZ/DCC in 3 weeks, trial of OAC and bystolic first  -[id number] and will start Eliquis today. 3. Body mass index is 34.79 kg/m². 4. Dyslipidemia  5. UC- s/p colon resection, healing well, 2 weeks postop. 6. Preop clearance ok to proceed then will readdress afib afterwards.    7. Etoh use 1/2 L a day, recommended he cut own     REmote hx [facility]   Fhx sister with pacemaker, uncle with pacer  He  has a past medical history of Allergic rhinitis, Arthritis, Back pain, Chronic pain (lower back), History of basal cell carcinoma (BCC) of skin (07/01/2019), Hypertension, Hypogonadism male, PUD (peptic ulcer disease), UC (ulcerative colitis) ([facility] [address] (1/20/2010), Ulcerative colitis, and Unspecified essential hypertension (1/20/2010). Cardiovascular ROS: positive for - dyspnea on exertion  Respiratory ROS: no cough, shortness of breath, or wheezing  Neurological ROS: no TIA or stroke symptoms  All other systems negative except as above. PE  Vitals:    07/09/20 1357   BP: 118/80   Pulse: 78   SpO2: 91%   Weight: 271 lb (122.9 kg)   Height: 6' 2\" (1.88 m)    Body mass index is 34.79 kg/m².    General appearance - alert, well appearing, and in no distress   Mental status - affect appropriate to mood  Eyes - sclera anicteric, moist mucous membranes  Neck - supple, no significant adenopathy  Lymphatics - no  lymphadenopathy  Chest - clear to auscultation, no wheezes, rales or rhonchi  Heart - normal rate, irregular rhythm, normal S1, S2, no murmurs, rubs, clicks or gallops  Abdomen - soft, nontender, nondistended, no masses or organomegaly  Back exam - full range of motion, no tenderness  Neurological - cranial nerves II through XII grossly intact, no focal deficit  Musculoskeletal - no muscular tenderness noted, normal strength  Extremities - peripheral pulses normal, no pedal edema  Skin - normal coloration  no rashes    Recent Labs:  Lab Results   Component Value Date/Time    Cholesterol, total 202 (H) 04/25/2019 09:01 AM    HDL Cholesterol 45 04/25/2019 09:01 AM    LDL, calculated 130 (H) 04/25/2019 09:01 AM    Triglyceride 133 04/25/2019 09:01 AM     Lab Results   Component Value Date/Time    Creatinine 1.02 04/25/2019 09:01 AM     Lab Results   Component Value Date/Time    BUN 18 04/25/2019 09:01 AM     Lab Results Component Value Date/Time    Potassium 4.6 2019 09:01 AM     Lab Results   Component Value Date/Time    Hemoglobin A1c 5.2 2017 09:45 AM     Lab Results   Component Value Date/Time    HGB 15.2 2019 09:01 AM     Lab Results   Component Value Date/Time    PLATELET 502  09:01 AM       Reviewed:  Past Medical History:   Diagnosis Date    Allergic rhinitis     Arthritis     Back pain     Chronic pain lower back    plus many other joints    History of basal cell carcinoma (BCC) of skin 2019    left side of face    Hypertension     Hypogonadism male     PUD (peptic ulcer disease)     UC [facility]ulcerative colitis) ([facility] [address] 2010    Ulcerative colitis     Unspecified essential hypertension 2010     Social History     Tobacco Use   Smoking Status Former Smoker    Years: 15.00    Last attempt to quit: 1995    Years since quittin.5   Smokeless Tobacco Never Used   Tobacco Comment    quit smoking cigarettes 20 yrs ago     Social History     Substance and Sexual Activity   Alcohol Use Yes    Alcohol/week: 6.0 standard drinks    Types: 7 Standard drinks or equivalent per week    Frequency: 4 or more times a week    Drinks per session: 3 or 4    Binge frequency: Never     Allergies   Allergen Reactions    No Known Allergies Other (comments)       Current Outpatient Medications   Medication Sig    amLODIPine (NORVASC) 5 mg tablet Take 5 mg by mouth daily.  atenolol (TENORMIN) 100 mg tablet TAKE ONE TABLET BY MOUTH TWICE A DAY    cyanocobalamin, vitamin B-12, (VITAMIN B12 PO) Take  by mouth.  cholecalciferol, VITAMIN D3, (VITAMIN D3) 5,000 unit tab tablet Take  by mouth daily.  PSYLLIUM SEED, WITH DEXTROSE, (FIBER PO) Take  by mouth.  MULTIVITAMIN PO Take  by mouth. Takes one po daily.  celecoxib (CELEBREX) 200 mg capsule TAKE ONE CAPSULE BY MOUTH TWICE A DAY    pantoprazole (PROTONIX) 40 mg tablet Take 1 Tab by mouth daily.     methylcellulose (CITRUCEL) 500 mg tablet Take 2 Tabs by mouth daily. No current facility-administered medications for this visit.         Irena Perez MD  [facility] [place]  [facility] [address] [facility] [address]  [facility], [address]

## 2020-07-09 NOTE — PATIENT INSTRUCTIONS
Start Eliquis(blood thinner) 5mg twice a day Check the price on Bystolic at the pharmacy, if it is not too expensive try taking it in place of your atenolol. Patient is scheduled for a MARYCRUZ/CV on Monday 7/27/20 @ 10:45 am with Dr. Sandy Whitley Patient will have Covid testing on Monday 7/20/20 @ [facility] 
Written instructions given to patient and he verbalized understanding.

## 2020-07-17 RX ORDER — DIPHENHYDRAMINE HYDROCHLORIDE 50 MG/ML
25 INJECTION, SOLUTION INTRAMUSCULAR; INTRAVENOUS
Status: CANCELLED | OUTPATIENT
Start: 2020-07-27 | End: 2020-07-28

## 2020-07-17 RX ORDER — SODIUM CHLORIDE 9 MG/ML
75 INJECTION, SOLUTION INTRAVENOUS CONTINUOUS
Status: CANCELLED | OUTPATIENT
Start: 2020-07-27

## 2020-07-20 ENCOUNTER — HOSPITAL ENCOUNTER (OUTPATIENT)
Dept: PREADMISSION TESTING | Age: 75
Discharge: HOME | End: 2020-07-20
Payer: MEDICARE

## 2020-07-20 DIAGNOSIS — Z01.818 PRE-OP TESTING: ICD-10-CM

## 2020-07-20 DIAGNOSIS — U07.1 COVID-19: ICD-10-CM

## 2020-07-20 PROCEDURE — 87635 SARS-COV-2 COVID-19 AMP PRB: CPT

## 2020-07-21 LAB — SARS-COV-2, COV2NT: NOT DETECTED

## 2020-07-27 ENCOUNTER — ANESTHESIA EVENT (OUTPATIENT)
Dept: CARDIAC CATH/INVASIVE PROCEDURES | Age: 75
End: 2020-07-27
Payer: MEDICARE

## 2020-07-27 ENCOUNTER — HOSPITAL ENCOUNTER (OUTPATIENT)
Dept: CARDIAC CATH/INVASIVE PROCEDURES | Age: 75
Discharge: HOME | End: 2020-07-27
Attending: INTERNAL MEDICINE | Admitting: INTERNAL MEDICINE
Payer: MEDICARE

## 2020-07-27 ENCOUNTER — ANESTHESIA (OUTPATIENT)
Dept: CARDIAC CATH/INVASIVE PROCEDURES | Age: 75
End: 2020-07-27
Payer: MEDICARE

## 2020-07-27 VITALS
DIASTOLIC BLOOD PRESSURE: 70 MMHG | HEIGHT: 74 IN | TEMPERATURE: 97.8 F | RESPIRATION RATE: 20 BRPM | BODY MASS INDEX: 34.78 KG/M2 | OXYGEN SATURATION: 94 % | HEART RATE: 59 BPM | SYSTOLIC BLOOD PRESSURE: 144 MMHG | WEIGHT: 271 LBS

## 2020-07-27 DIAGNOSIS — I48.91 ATRIAL FIBRILLATION, UNSPECIFIED TYPE (HCC): ICD-10-CM

## 2020-07-27 LAB
ANION GAP SERPL CALC-SCNC: 9 MMOL/L (ref 5–15)
ATRIAL RATE: 59 BPM
BASOPHILS # BLD: 0 K/UL (ref 0–0.1)
BASOPHILS NFR BLD: 1 % (ref 0–1)
BUN SERPL-MCNC: 19 MG/DL (ref 6–20)
BUN/CREAT SERPL: 22 (ref 12–20)
CALCIUM SERPL-MCNC: 9.4 MG/DL (ref 8.5–10.1)
CALCULATED P AXIS, ECG09: 43 DEGREES
CALCULATED R AXIS, ECG10: 17 DEGREES
CALCULATED T AXIS, ECG11: 30 DEGREES
CHLORIDE SERPL-SCNC: 106 MMOL/L (ref 97–108)
CO2 SERPL-SCNC: 24 MMOL/L (ref 21–32)
CREAT SERPL-MCNC: 0.86 MG/DL (ref 0.7–1.3)
DIAGNOSIS, 93000: NORMAL
DIFFERENTIAL METHOD BLD: ABNORMAL
EOSINOPHIL # BLD: 0.2 K/UL (ref 0–0.4)
EOSINOPHIL NFR BLD: 3 % (ref 0–7)
ERYTHROCYTE [DISTWIDTH] IN BLOOD BY AUTOMATED COUNT: 13.1 % (ref 11.5–14.5)
GLUCOSE SERPL-MCNC: 127 MG/DL (ref 65–100)
HCT VFR BLD AUTO: 44 % (ref 36.6–50.3)
HGB BLD-MCNC: 14.9 G/DL (ref 12.1–17)
IMM GRANULOCYTES # BLD AUTO: 0 K/UL (ref 0–0.04)
IMM GRANULOCYTES NFR BLD AUTO: 1 % (ref 0–0.5)
LYMPHOCYTES # BLD: 1.6 K/UL (ref 0.8–3.5)
LYMPHOCYTES NFR BLD: 26 % (ref 12–49)
MCH RBC QN AUTO: 34.3 PG (ref 26–34)
MCHC RBC AUTO-ENTMCNC: 33.9 G/DL (ref 30–36.5)
MCV RBC AUTO: 101.1 FL (ref 80–99)
MONOCYTES # BLD: 0.7 K/UL (ref 0–1)
MONOCYTES NFR BLD: 12 % (ref 5–13)
NEUTS SEG # BLD: 3.4 K/UL (ref 1.8–8)
NEUTS SEG NFR BLD: 57 % (ref 32–75)
NRBC # BLD: 0 K/UL (ref 0–0.01)
NRBC BLD-RTO: 0 PER 100 WBC
P-R INTERVAL, ECG05: 224 MS
PLATELET # BLD AUTO: 124 K/UL (ref 150–400)
PMV BLD AUTO: 10.2 FL (ref 8.9–12.9)
POTASSIUM SERPL-SCNC: 3.9 MMOL/L (ref 3.5–5.1)
Q-T INTERVAL, ECG07: 472 MS
QRS DURATION, ECG06: 138 MS
QTC CALCULATION (BEZET), ECG08: 467 MS
RBC # BLD AUTO: 4.35 M/UL (ref 4.1–5.7)
SODIUM SERPL-SCNC: 139 MMOL/L (ref 136–145)
VENTRICULAR RATE, ECG03: 59 BPM
WBC # BLD AUTO: 5.9 K/UL (ref 4.1–11.1)

## 2020-07-27 PROCEDURE — 80048 BASIC METABOLIC PNL TOTAL CA: CPT

## 2020-07-27 PROCEDURE — 76377 3D RENDER W/INTRP POSTPROCES: CPT

## 2020-07-27 PROCEDURE — 77030020177 HC ELECTRD DEFIB PD PHIL -B

## 2020-07-27 PROCEDURE — 74011250636 HC RX REV CODE- 250/636: Performed by: INTERNAL MEDICINE

## 2020-07-27 PROCEDURE — 85025 COMPLETE CBC W/AUTO DIFF WBC: CPT

## 2020-07-27 PROCEDURE — 93325 DOPPLER ECHO COLOR FLOW MAPG: CPT

## 2020-07-27 PROCEDURE — 92960 CARDIOVERSION ELECTRIC EXT: CPT

## 2020-07-27 PROCEDURE — 76060000031 HC ANESTHESIA FIRST 0.5 HR

## 2020-07-27 PROCEDURE — 36415 COLL VENOUS BLD VENIPUNCTURE: CPT

## 2020-07-27 PROCEDURE — 74011250636 HC RX REV CODE- 250/636: Performed by: NURSE ANESTHETIST, CERTIFIED REGISTERED

## 2020-07-27 PROCEDURE — 93041 RHYTHM ECG TRACING: CPT

## 2020-07-27 RX ORDER — PROPOFOL 10 MG/ML
INJECTION, EMULSION INTRAVENOUS AS NEEDED
Status: DISCONTINUED | OUTPATIENT
Start: 2020-07-27 | End: 2020-07-27 | Stop reason: HOSPADM

## 2020-07-27 RX ORDER — DIPHENHYDRAMINE HYDROCHLORIDE 50 MG/ML
25 INJECTION, SOLUTION INTRAMUSCULAR; INTRAVENOUS
Status: DISCONTINUED | OUTPATIENT
Start: 2020-07-27 | End: 2020-07-28 | Stop reason: HOSPADM

## 2020-07-27 RX ORDER — SODIUM CHLORIDE 9 MG/ML
INJECTION, SOLUTION INTRAVENOUS
Status: DISCONTINUED | OUTPATIENT
Start: 2020-07-27 | End: 2020-07-27 | Stop reason: HOSPADM

## 2020-07-27 RX ORDER — SODIUM CHLORIDE 9 MG/ML
75 INJECTION, SOLUTION INTRAVENOUS CONTINUOUS
Status: DISCONTINUED | OUTPATIENT
Start: 2020-07-27 | End: 2020-07-28 | Stop reason: HOSPADM

## 2020-07-27 RX ADMIN — PROPOFOL 40 MG: 10 INJECTION, EMULSION INTRAVENOUS at 11:01

## 2020-07-27 RX ADMIN — PROPOFOL 30 MG: 10 INJECTION, EMULSION INTRAVENOUS at 11:13

## 2020-07-27 RX ADMIN — SODIUM CHLORIDE 75 ML/HR: 900 INJECTION, SOLUTION INTRAVENOUS at 10:15

## 2020-07-27 RX ADMIN — PROPOFOL 30 MG: 10 INJECTION, EMULSION INTRAVENOUS at 11:16

## 2020-07-27 RX ADMIN — PROPOFOL 100 MG: 10 INJECTION, EMULSION INTRAVENOUS at 11:00

## 2020-07-27 RX ADMIN — PROPOFOL 40 MG: 10 INJECTION, EMULSION INTRAVENOUS at 11:05

## 2020-07-27 RX ADMIN — PROPOFOL 40 MG: 10 INJECTION, EMULSION INTRAVENOUS at 11:03

## 2020-07-27 RX ADMIN — PROPOFOL 30 MG: 10 INJECTION, EMULSION INTRAVENOUS at 11:08

## 2020-07-27 RX ADMIN — SODIUM CHLORIDE: 900 INJECTION, SOLUTION INTRAVENOUS at 11:00

## 2020-07-27 NOTE — PROGRESS NOTES
Cardiac Cath Lab Recovery Arrival Note:      Nino Aden arrived to Cardiac Cath Lab, Recovery Area. Staff introduced to patient. Patient identifiers verified with NAME and DATE OF BIRTH. Procedure verified with patient. Consent forms reviewed and signed by patient or authorized representative and verified. Allergies verified. Patient and family oriented to department. Patient and family informed of procedure and plan of care. Questions answered with review. Patient prepped for procedure, per orders from physician, prior to arrival.    Patient on cardiac monitor, non-invasive blood pressure, SPO2 monitor. On room air. Patient is A&Ox 4. Patient reports no complaints. Patient in stretcher, in low position, with side rails up, call bell within reach, patient instructed to call if assistance as needed. Patient prep in: [address], [place] [address]. Patient family has pager # [phone]  Family in: outside hospital.   Prep by: Donaldo Max RN  Pre MARYCRUZ/CV teaching completed    DR Kwesi Burns in to talk with pt.

## 2020-07-27 NOTE — ANESTHESIA POSTPROCEDURE EVALUATION
* No procedures listed *. MAC    Anesthesia Post Evaluation      Multimodal analgesia: multimodal analgesia not used between 6 hours prior to anesthesia start to PACU discharge  Patient location during evaluation: PACU  Patient participation: complete - patient participated  Level of consciousness: awake  Pain score: 0  Pain management: adequate  Airway patency: patent  Anesthetic complications: no  Cardiovascular status: acceptable  Respiratory status: acceptable  Hydration status: acceptable  Comments: I have evaluated the patient and meets criteria for discharge from PACU. Carol Vincent MD        INITIAL Post-op Vital signs: No vitals data found for the desired time range.

## 2020-07-27 NOTE — PROGRESS NOTES
TRANSFER - IN REPORT:    Verbal report received from [facility] on [address]  being received from procedure for routine progression of care. Report consisted of patients Situation, Background, Assessment and Recommendations(SBAR). Information from the following report(s) Procedure Summary, MAR, Recent Results and Med Rec Status was reviewed with the receiving clinician. Opportunity for questions and clarification was provided. Assessment completed upon patients arrival to [address] [place] [address] and care assumed. Cardiac Cath Lab Recovery Arrival Note:    [address] arrived to [facility] recovery area. Patient procedure= MARYCRUZ/CV. Patient on cardiac monitor, non-invasive blood pressure, SPO2 monitor. On room air. IV  of nacl on pump at 25 ml/hr. Patient status doing well without problems. Patient is A&Ox 4. Patient reports no complaints. PROCEDURE SITE CHECK:    Procedure site:chest wall sl pink color, no pain/discomfort reported at procedure site. No change in patient status. Continue to monitor patient and status.     12 lead EKG completed

## 2020-07-27 NOTE — PROCEDURES
Patient sedated with assistance of anesthesia. MARYCRUZ probe passed without difficulty, images obtained, and probe removed without incident. Patient tolerated procedure well. Please see full note in Cardiology section. Summary:  NOrmal LV systolic function. No evidence of LA thrombus  No evidence of valvular vegetation. Brief cardioversion note    Indications: Symptomatic persistent atrial fibrillation    Procedure details: Patient was sedated by anesthesia/CRNA with propofol. Please see their note for further details. Attachable pads were placed in anterio posterior alignment Single Shock was given using a biphasic synchronized cardioversion. 200 Joules of shock was successful in cardioverting the patient into sinus rhythm. Patient was transferred out of Cath Lab/stress lab to recovery in a stable condition.

## 2020-07-27 NOTE — PROGRESS NOTES
Dr Rufina Marquez talked with pt and wife via the phone    [phone] discharge instructions reviewed with pt  and wife

## 2020-07-27 NOTE — ANESTHESIA PREPROCEDURE EVALUATION
Relevant Problems   No relevant active problems       Anesthetic History   No history of anesthetic complications            Review of Systems / Medical History  Patient summary reviewed, nursing notes reviewed and pertinent labs reviewed    Pulmonary  Within defined limits                 Neuro/Psych   Within defined limits           Cardiovascular    Hypertension                   GI/Hepatic/Renal           PUD     Endo/Other        Obesity and arthritis     Other Findings              Physical Exam    Airway  Mallampati: II  TM Distance: > 6 cm  Neck ROM: normal range of motion   Mouth opening: Normal     Cardiovascular  Regular rate and rhythm,  S1 and S2 normal,  no murmur, click, rub, or gallop             Dental  No notable dental hx       Pulmonary  Breath sounds clear to auscultation               Abdominal  GI exam deferred       Other Findings            Anesthetic Plan    ASA: 3  Anesthesia type: MAC            Anesthetic plan and risks discussed with: Patient

## 2020-07-29 RX ORDER — AMLODIPINE BESYLATE 5 MG/1
5 TABLET ORAL DAILY
Qty: 30 TAB | Refills: 0 | Status: SHIPPED | OUTPATIENT
Start: 2020-07-29 | End: 2020-08-05 | Stop reason: SDUPTHER

## 2020-08-05 ENCOUNTER — OFFICE VISIT (OUTPATIENT)
Dept: CARDIOLOGY CLINIC | Age: 75
End: 2020-08-05
Payer: MEDICARE

## 2020-08-05 VITALS
DIASTOLIC BLOOD PRESSURE: 80 MMHG | BODY MASS INDEX: 34.52 KG/M2 | SYSTOLIC BLOOD PRESSURE: 140 MMHG | WEIGHT: 269 LBS | HEART RATE: 72 BPM | HEIGHT: 74 IN | RESPIRATION RATE: 18 BRPM | OXYGEN SATURATION: 98 %

## 2020-08-05 DIAGNOSIS — I48.0 PAROXYSMAL ATRIAL FIBRILLATION (HCC): Primary | ICD-10-CM

## 2020-08-05 DIAGNOSIS — I10 ESSENTIAL HYPERTENSION: ICD-10-CM

## 2020-08-05 DIAGNOSIS — R53.83 FATIGUE, UNSPECIFIED TYPE: ICD-10-CM

## 2020-08-05 PROCEDURE — G8432 DEP SCR NOT DOC, RNG: HCPCS | Performed by: NURSE PRACTITIONER

## 2020-08-05 PROCEDURE — 1101F PT FALLS ASSESS-DOCD LE1/YR: CPT | Performed by: NURSE PRACTITIONER

## 2020-08-05 PROCEDURE — 3017F COLORECTAL CA SCREEN DOC REV: CPT | Performed by: NURSE PRACTITIONER

## 2020-08-05 PROCEDURE — G8427 DOCREV CUR MEDS BY ELIG CLIN: HCPCS | Performed by: NURSE PRACTITIONER

## 2020-08-05 PROCEDURE — G8753 SYS BP > OR = 140: HCPCS | Performed by: NURSE PRACTITIONER

## 2020-08-05 PROCEDURE — G8536 NO DOC ELDER MAL SCRN: HCPCS | Performed by: NURSE PRACTITIONER

## 2020-08-05 PROCEDURE — 99213 OFFICE O/P EST LOW 20 MIN: CPT | Performed by: NURSE PRACTITIONER

## 2020-08-05 PROCEDURE — G8417 CALC BMI ABV UP PARAM F/U: HCPCS | Performed by: NURSE PRACTITIONER

## 2020-08-05 PROCEDURE — G8754 DIAS BP LESS 90: HCPCS | Performed by: NURSE PRACTITIONER

## 2020-08-05 RX ORDER — AMLODIPINE BESYLATE 5 MG/1
7.5 TABLET ORAL DAILY
Qty: 45 TAB | Refills: 2 | Status: SHIPPED | OUTPATIENT
Start: 2020-08-05 | End: 2020-11-18

## 2020-08-05 RX ORDER — NEBIVOLOL 20 MG/1
20 TABLET ORAL EVERY EVENING
Qty: 90 TAB | Refills: 3 | Status: SHIPPED | OUTPATIENT
Start: 2020-08-05 | End: 2021-02-19 | Stop reason: ALTCHOICE

## 2020-08-05 NOTE — PATIENT INSTRUCTIONS
Please increase your amlodipine to 7.5mg (that is 1.5 tablets) daily Continue to check your blood pressure daily and keep a record of your heart rate and blood pressure readings to share at your appointments Please do not add salt to your food or eat salty snacks Please begin taking bystolic in the evening Please do not stop taking Eliquis without speaking to your cardiologist first.  Jonni Curling have been prescribed a blood thinner to prevent the development of blood clots. Please notify your cardiologist immediately if you notice blood in your urine or stool, have dark stools, have significant nosebleeds or notice any other unusual bleeding or bruising. 
-Now that you are taking a oral anti-coagulant you CANNOT take any anti-inflammatory medications (NSAIDS) this would include: Advil, Aleve, Ibuprofen, Mobic, Diclofenac

## 2020-08-05 NOTE — PROGRESS NOTES
[facility] [address]:   [phone]    HPI: Ash Reddy, a 69y.o. year-old who presents for follow up regarding AFib. He is feeling better since his cardioversion  No palpitations or chest pain  No dyspnea with exertion  No PND or orthopnea  No bleeding or unusual bruising on eliquis  Reviewed home BP readings and his BP was slightly elevated at home  Drinks 100 ml of wine daily - discussed association between etoh use and AFib and advised him to cut his etoh intake in half  Has spinal stenosis, walks with a cane  No LE edema   He has a stationary bike and rides that daily, rides it 15 miles a day  RVE slightly on echo, consider DILLON evaluation in the future   No hx of varices on EGD 2-3 years ago as far as he knows. Assessment/Plan:  1. HTN - slightly elevated, continue bystolic, advised him to increase amlodipine to 7.5mg daily    2. Afib- s/p DCCV 7/27/20 and in NSR today, advised him to begin taking bystolic in the evening to improve fatigue  -[id number] so will continue Eliquis 5mg BID, advised him to cut his daily intake of wine in half    -he will follow up with Dr. Adonis Lopez in November 2020  3. Body mass index is 34.54 kg/m². 4. Dyslipidemia -  in 4/19, will check fasting lipids with next set of labs  5. UC- s/p colon resection, followed by Dr. Mercedes Fuentes      DCCV 7/27/20  Echo 6/20 - EF 55-60%, grade 1 dd, AV sclerosis, trace MR, PASP 27 mmHg    Soc Hx: remote hx tobacco use  Fhx sister with pacemaker, uncle with pacer    He  has a past medical history of Allergic rhinitis, Arthritis, Back pain, Chronic pain (lower back), History of basal cell carcinoma (BCC) of skin (07/01/2019), Hypertension, Hypogonadism male, PUD (peptic ulcer disease), UC (ulcerative colitis) ([facility] [address] (1/20/2010), Ulcerative colitis, and Unspecified essential hypertension (1/20/2010).     Cardiovascular ROS: no chest pain or palpitations   Respiratory ROS: no cough, shortness of breath, or wheezing  Neurological ROS: no TIA or stroke symptoms  All other systems negative except as above. PE  Vitals:    08/05/20 1501   BP: 140/80   Pulse: 72   Resp: 18   SpO2: 98%   Weight: 269 lb (122 kg)   Height: 6' 2\" (1.88 m)    Body mass index is 34.54 kg/m².    General appearance - alert, well appearing, and in no distress   Mental status - affect appropriate to mood  Eyes - sclera anicteric, moist mucous membranes  Neck - supple, no significant adenopathy  Lymphatics - no  lymphadenopathy  Chest - clear to auscultation, no wheezes, rales or rhonchi  Heart - normal rate, regular rhythm, normal S1, S2, no murmurs, rubs, clicks or gallops  Abdomen - soft, nontender, nondistended  Back exam - full range of motion, no tenderness  Neurological - cranial nerves II through XII grossly intact, no focal deficit  Musculoskeletal - no muscular tenderness noted, normal strength  Extremities - peripheral pulses normal, no pedal edema  Skin - normal coloration  no rashes    Recent Labs:  Lab Results   Component Value Date/Time    Cholesterol, total 202 (H) 04/25/2019 09:01 AM    HDL Cholesterol 45 04/25/2019 09:01 AM    LDL, calculated 130 (H) 04/25/2019 09:01 AM    Triglyceride 133 04/25/2019 09:01 AM     Lab Results   Component Value Date/Time    Creatinine 0.86 07/27/2020 10:23 AM     Lab Results   Component Value Date/Time    BUN 19 07/27/2020 10:23 AM     Lab Results   Component Value Date/Time    Potassium 3.9 07/27/2020 10:23 AM     Lab Results   Component Value Date/Time    Hemoglobin A1c 5.2 11/22/2017 09:45 AM     Lab Results   Component Value Date/Time    HGB 14.9 07/27/2020 10:23 AM     Lab Results   Component Value Date/Time    PLATELET 614 (L) 30/93/5834 10:23 AM       Reviewed:  Past Medical History:   Diagnosis Date    Allergic rhinitis     Arthritis     Back pain     Chronic pain lower back    plus many other joints    History of basal cell carcinoma (BCC) of skin 07/01/2019    left side of face    Hypertension    [facility] Hypogonadism male     PUD (peptic ulcer disease)     UC (ulcerative colitis) ([facility] [address] 2010    Ulcerative colitis     Unspecified essential hypertension 2010     Social History     Tobacco Use   Smoking Status Former Smoker    Years: 15.00    Last attempt to quit: 1995    Years since quittin.6   Smokeless Tobacco Never Used   Tobacco Comment    quit smoking cigarettes 20 yrs ago     Social History     Substance and Sexual Activity   Alcohol Use Yes    Alcohol/week: 6.0 standard drinks    Types: 7 Standard drinks or equivalent per week    Frequency: 4 or more times a week    Drinks per session: 3 or 4    Binge frequency: Never     Allergies   Allergen Reactions    No Known Allergies Other (comments)       Current Outpatient Medications   Medication Sig    amLODIPine (NORVASC) 5 mg tablet Take 1 Tab by mouth daily.  apixaban (Eliquis) 5 mg tablet Take 1 Tab by mouth two (2) times a day. Indications: treatment to prevent blood clots in chronic atrial fibrillation    nebivoloL (BYSTOLIC) 20 mg tablet Take 1 Tab by mouth daily. This replaces atenolol    cyanocobalamin, vitamin B-12, (VITAMIN B12 PO) Take  by mouth.  cholecalciferol, VITAMIN D3, (VITAMIN D3) 5,000 unit tab tablet Take  by mouth daily.  PSYLLIUM SEED, WITH DEXTROSE, (FIBER PO) Take  by mouth.  MULTIVITAMIN PO Take  by mouth. Takes one po daily.  pantoprazole (PROTONIX) 40 mg tablet Take 1 Tab by mouth daily. No current facility-administered medications for this visit.         Yanira Wooten NP  [employer] [facility] [place]  [address], [address] [place] [address]

## 2020-08-19 ENCOUNTER — OFFICE VISIT (OUTPATIENT)
Dept: INTERNAL MEDICINE CLINIC | Age: 75
End: 2020-08-19
Payer: MEDICARE

## 2020-08-19 VITALS
WEIGHT: 272 LBS | OXYGEN SATURATION: 99 % | TEMPERATURE: 97.6 F | HEIGHT: 74 IN | RESPIRATION RATE: 18 BRPM | SYSTOLIC BLOOD PRESSURE: 163 MMHG | DIASTOLIC BLOOD PRESSURE: 94 MMHG | BODY MASS INDEX: 34.91 KG/M2 | HEART RATE: 77 BPM

## 2020-08-19 DIAGNOSIS — R73.01 FASTING HYPERGLYCEMIA: ICD-10-CM

## 2020-08-19 DIAGNOSIS — D64.9 ANEMIA, UNSPECIFIED TYPE: ICD-10-CM

## 2020-08-19 DIAGNOSIS — M54.40 LOW BACK PAIN WITH SCIATICA, SCIATICA LATERALITY UNSPECIFIED, UNSPECIFIED BACK PAIN LATERALITY, UNSPECIFIED CHRONICITY: ICD-10-CM

## 2020-08-19 DIAGNOSIS — M47.26 OSTEOARTHRITIS OF SPINE WITH RADICULOPATHY, LUMBAR REGION: ICD-10-CM

## 2020-08-19 DIAGNOSIS — Z12.5 PROSTATE CANCER SCREENING: ICD-10-CM

## 2020-08-19 DIAGNOSIS — K51.319 ULCERATIVE RECTOSIGMOIDITIS WITH COMPLICATION (HCC): ICD-10-CM

## 2020-08-19 DIAGNOSIS — E78.2 MIXED HYPERLIPIDEMIA: ICD-10-CM

## 2020-08-19 DIAGNOSIS — Z13.5 GLAUCOMA SCREENING: ICD-10-CM

## 2020-08-19 DIAGNOSIS — I10 ESSENTIAL HYPERTENSION: ICD-10-CM

## 2020-08-19 DIAGNOSIS — E66.01 SEVERE OBESITY (BMI 35.0-39.9) WITH COMORBIDITY (HCC): ICD-10-CM

## 2020-08-19 DIAGNOSIS — Z00.00 MEDICARE ANNUAL WELLNESS VISIT, SUBSEQUENT: Primary | ICD-10-CM

## 2020-08-19 PROCEDURE — 99214 OFFICE O/P EST MOD 30 MIN: CPT | Performed by: INTERNAL MEDICINE

## 2020-08-19 PROCEDURE — G8536 NO DOC ELDER MAL SCRN: HCPCS | Performed by: INTERNAL MEDICINE

## 2020-08-19 PROCEDURE — G8427 DOCREV CUR MEDS BY ELIG CLIN: HCPCS | Performed by: INTERNAL MEDICINE

## 2020-08-19 PROCEDURE — G0439 PPPS, SUBSEQ VISIT: HCPCS | Performed by: INTERNAL MEDICINE

## 2020-08-19 PROCEDURE — G0463 HOSPITAL OUTPT CLINIC VISIT: HCPCS | Performed by: INTERNAL MEDICINE

## 2020-08-19 PROCEDURE — G8510 SCR DEP NEG, NO PLAN REQD: HCPCS | Performed by: INTERNAL MEDICINE

## 2020-08-19 PROCEDURE — G0444 DEPRESSION SCREEN ANNUAL: HCPCS | Performed by: INTERNAL MEDICINE

## 2020-08-19 PROCEDURE — G8753 SYS BP > OR = 140: HCPCS | Performed by: INTERNAL MEDICINE

## 2020-08-19 PROCEDURE — 1100F PTFALLS ASSESS-DOCD GE2>/YR: CPT | Performed by: INTERNAL MEDICINE

## 2020-08-19 PROCEDURE — G9711 PT HX TOT COL OR COLON CA: HCPCS | Performed by: INTERNAL MEDICINE

## 2020-08-19 PROCEDURE — G8755 DIAS BP > OR = 90: HCPCS | Performed by: INTERNAL MEDICINE

## 2020-08-19 PROCEDURE — G8417 CALC BMI ABV UP PARAM F/U: HCPCS | Performed by: INTERNAL MEDICINE

## 2020-08-19 PROCEDURE — 3288F FALL RISK ASSESSMENT DOCD: CPT | Performed by: INTERNAL MEDICINE

## 2020-08-19 RX ORDER — ALUMINUM ZIRCONIUM OCTACHLOROHYDREX GLY 16 G/100G
1 GEL TOPICAL DAILY
COMMUNITY
Start: 2020-08-19 | End: 2021-08-11 | Stop reason: ALTCHOICE

## 2020-08-19 NOTE — PROGRESS NOTES
This is the Subsequent Medicare Annual Wellness Exam, performed 12 months or more after the Initial AWV or the last Subsequent AWV    I have reviewed the patient's medical history in detail and updated the computerized patient record. As well as a follow-up for his health related issues. He recently underwent surgery for an abnormality in his colon that was found to be likely related to ulcerative colitis. Since his surgery in June he has had bloating and gas and discomfort. His bowel movements have been somewhat softer. No melena or hematochezia. No fevers or chills. He is also concerned about his blood sugars which have been elevated recently. His blood pressure medicines have been adjusted by Dr. Lisa Rowley. He continues to have lower back pain all the time. He is unable to tolerate the tramadol secondary to sedation. Review of systems is otherwise negative. History     Patient Active Problem List   Diagnosis Code    Hypogonadism male E29.1    Essential hypertension I10    UC (ulcerative colitis) ([address] K51.90    Anemia D64.9    DJD (degenerative joint disease), lumbar M47.816    Open angle glaucoma suspect [id number]    Advance directive on file Z78.9    Severe obesity (BMI 35.0-39. 9) with comorbidity ([address] E66.01     Past Medical History:   Diagnosis Date    Allergic rhinitis     Arthritis     Back pain     Cancer ([address] 2019    Basal cell - face    Chronic pain lower back    plus many other joints    History of basal cell carcinoma (BCC) of skin 07/01/2019    left side of face    Hypertension     Hypogonadism male     PUD (peptic ulcer disease)     [facility] 1/20/2010    Ulcerative colitis     Unspecified essential hypertension 1/20/2010      Past Surgical History:   Procedure Laterality Date    HX COLECTOMY  06/24/2020    partial colectomy    HX COLONOSCOPY  02/07/2017    3 yr f/u - Dr. Katt Ramirez Left 07/01/2019    basal cell carcinoma left side of face.  HX TONSILLECTOMY      HX WISDOM TEETH EXTRACTION       Current Outpatient Medications   Medication Sig Dispense Refill    Bifidobacterium Infantis (Align) 4 mg cap Take 1 Cap by mouth daily.  amLODIPine (NORVASC) 5 mg tablet Take 1.5 Tabs by mouth daily. 45 Tab 2    nebivoloL (BYSTOLIC) 20 mg tablet Take 1 Tab by mouth every evening. 90 Tab 3    apixaban (Eliquis) 5 mg tablet Take 1 Tab by mouth two (2) times a day. Indications: treatment to prevent blood clots in chronic atrial fibrillation 180 Tab 3    cyanocobalamin, vitamin B-12, (VITAMIN B12 PO) Take  by mouth.  cholecalciferol, VITAMIN D3, (VITAMIN D3) 5,000 unit tab tablet Take  by mouth daily.  PSYLLIUM SEED, WITH DEXTROSE, (FIBER PO) Take  by mouth.  MULTIVITAMIN PO Take  by mouth. Takes one po daily. Allergies   Allergen Reactions    No Known Allergies Other (comments)       Family History   Problem Relation Age of Onset    Cancer Mother         ovarian,colon    Diabetes Sister      Social History     Tobacco Use    Smoking status: Former Smoker     Packs/day: 1.00     Years: 30.00     Pack years: 30.00     Last attempt to quit: 1995     Years since quittin.6    Smokeless tobacco: Never Used    Tobacco comment: quit smoking cigarettes 20 yrs ago   Substance Use Topics    Alcohol use:  Yes     Alcohol/week: 25.0 standard drinks     Types: 25 Glasses of wine per week     Frequency: 4 or more times a week     Drinks per session: 3 or 4     Binge frequency: Never   Physical Examination: General appearance - alert, well appearing, and in no distress  Ears - bilateral TM's and external ear canals normal  Nose - normal and patent, no erythema, discharge or polyps  Mouth - mucous membranes moist, pharynx normal without lesions  Neck - supple, no significant adenopathy  Lymphatics - no palpable lymphadenopathy, no hepatosplenomegaly  Chest - clear to auscultation, no wheezes, rales or rhonchi, symmetric air entry  Heart - normal rate and regular rhythm  Abdomen - soft, nontender, nondistended, no masses or organomegaly  Neurological - alert, oriented, normal speech, no focal findings or movement disorder noted  Musculoskeletal - no joint tenderness, deformity or swelling  Extremities - peripheral pulses normal, no pedal edema, no clubbing or cyanosis      Depression Risk Factor Screening:     3 most recent PHQ Screens 8/19/2020   Little interest or pleasure in doing things Not at all   Feeling down, depressed, irritable, or hopeless Not at all   Total Score PHQ 2 0       Alcohol Risk Factor Screening (MALE > 65): Do you average more 1 drink per night or more than 7 drinks a week: Yes    In the past three months have you have had more than 4 drinks containing alcohol on one occasion: Yes      Functional Ability and Level of Safety:   Hearing: Hearing is good. Activities of Daily Living: The home contains: no safety equipment. Patient does total self care     Ambulation: with no difficulty     Fall Risk:  Fall Risk Assessment, last 12 mths 8/19/2020   Able to walk? Yes   Fall in past 12 months? Yes   Fall with injury? No   Number of falls in past 12 months 1   Fall Risk Score 1     Abuse Screen:  Patient is not abused       Cognitive Screening   Has your family/caregiver stated any concerns about your memory: no         Patient Care Team   Patient Care Team:  Rosamaria King MD as PCP - General  Rosamaria King MD as PCP - [address] [place] [address] Provider  Lilia Abbasi MD (Gastroenterology)  Cm Aguila MD (Ophthalmology)  Arsh Kirk MD (Cardiology)    Assessment/Plan   Education and counseling provided:  Are appropriate based on today's review and evaluation  End-of-Life planning (with patient's consent)  Influenza Vaccine  Prostate cancer screening tests (PSA, covered annually)  Diabetes screening test    Diagnoses and all orders for this visit:    1.  Osteoarthritis of spine with radiculopathy, lumbar regiondiscussed using Tylenol and he will try this to see if it is helpful. 2. Severe obesity (BMI 35.0-39. 9) with comorbidity (HCC)we will continue to work on diet and exercise for weight loss. 3. Essential hypertension Per cardiology. Blood pressure was elevated today. -     METABOLIC PANEL, COMPREHENSIVE    4. Ulcerative rectosigmoiditis with complication (HCC)with some increase GI symptoms. At this point will start a probiotic. If not improved, refer back to GI to consider restarting medications for this. -     METABOLIC PANEL, COMPREHENSIVE    5. Glaucoma screening  -     REFERRAL TO OPHTHALMOLOGY    6. Low back pain with sciatica, sciatica laterality unspecified, unspecified back pain laterality, unspecified chronicityTylenol as needed for discomfort. 7. Anemia, unspecified typerepeat blood test were normal recently. 8. Prostate cancer screening  -     PSA SCREENING (SCREENING)    9. Fasting hyperglycemia check A1c. He was strongly encouraged to discontinue and reduce carbohydrates including his alcohol.  -     LIPID PANEL  -     HEMOGLOBIN A1C WITH EAG    10. Mixed hyperlipidemiadiet controlled. Check labs to be sure that is adequate. -     LIPID PANEL    11.  Medicare annual wellness visit, subsequent        Health Maintenance Due   Topic Date Due    GLAUCOMA SCREENING Q2Y  12/04/2019    Medicare Yearly Exam  04/24/2020    Influenza Age 5 to Adult  08/01/2020

## 2020-08-19 NOTE — PATIENT INSTRUCTIONS
Medicare Wellness Visit, Male The best way to live healthy is to have a lifestyle where you eat a well-balanced diet, exercise regularly, limit alcohol use, and quit all forms of tobacco/nicotine, if applicable. Regular preventive services are another way to keep healthy. Preventive services (vaccines, screening tests, monitoring & exams) can help personalize your care plan, which helps you manage your own care. Screening tests can find health problems at the earliest stages, when they are easiest to treat. [facility] follows the current, evidence-based guidelines published by the [facility] ([facility]) when recommending preventive services for our patients. Because we follow these guidelines, sometimes recommendations change over time as research supports it. (For example, a prostate screening blood test is no longer routinely recommended for men with no symptoms). Of course, you and your doctor may decide to screen more often for some diseases, based on your risk and co-morbidities (chronic disease you are already diagnosed with). Preventive services for you include: - Medicare offers their members a free annual wellness visit, which is time for you and your primary care provider to discuss and plan for your preventive service needs. Take advantage of this benefit every year! 
-All adults over age 72 should receive the recommended pneumonia vaccines. Current USPSTF guidelines recommend a series of two vaccines for the best pneumonia protection.  
-All adults should have a flu vaccine yearly and tetanus vaccine every 10 years. 
-All adults age 48 and older should receive the shingles vaccines (series of two vaccines).       
-All adults age 38-68 who are overweight should have a diabetes screening test once every three years.  
-Other screening tests & preventive services for persons with diabetes include: an eye exam to screen for diabetic retinopathy, a kidney function test, a foot exam, and stricter control over your cholesterol.  
-Cardiovascular screening for adults with routine risk involves an electrocardiogram (ECG) at intervals determined by the provider.  
-Colorectal cancer screening should be done for adults age 54-65 with no increased risk factors for colorectal cancer. There are a number of acceptable methods of screening for this type of cancer. Each test has its own benefits and drawbacks. Discuss with your provider what is most appropriate for you during your annual wellness visit. The different tests include: colonoscopy (considered the best screening method), a fecal occult blood test, a fecal DNA test, and sigmoidoscopy. 
-All adults born between [facility] should be screened once for Hepatitis C. 
-An Abdominal Aortic Aneurysm (AAA) Screening is recommended for men age 73-68 who has ever smoked in their lifetime. Here is a list of your current Health Maintenance items (your personalized list of preventive services) with a due date: 
Health Maintenance Due Topic Date Due  Glaucoma Screening   12/04/2019 [facility] Annual Well Visit  04/24/2020  Flu Vaccine  08/01/2020

## 2020-08-27 ENCOUNTER — HOSPITAL ENCOUNTER (OUTPATIENT)
Dept: LAB | Age: 75
Discharge: HOME | End: 2020-08-27
Payer: MEDICARE

## 2020-08-27 PROCEDURE — 80061 LIPID PANEL: CPT

## 2020-08-27 PROCEDURE — 80053 COMPREHEN METABOLIC PANEL: CPT

## 2020-08-27 PROCEDURE — 36415 COLL VENOUS BLD VENIPUNCTURE: CPT

## 2020-08-27 PROCEDURE — 83036 HEMOGLOBIN GLYCOSYLATED A1C: CPT

## 2020-08-27 PROCEDURE — 84153 ASSAY OF PSA TOTAL: CPT

## 2020-08-28 LAB
ALBUMIN SERPL-MCNC: 4.5 G/DL (ref 3.7–4.7)
ALBUMIN/GLOB SERPL: 2 {RATIO} (ref 1.2–2.2)
ALP SERPL-CCNC: 71 IU/L (ref 39–117)
ALT SERPL-CCNC: 23 IU/L (ref 0–44)
AST SERPL-CCNC: 31 IU/L (ref 0–40)
BILIRUB SERPL-MCNC: 0.4 MG/DL (ref 0–1.2)
BUN SERPL-MCNC: 20 MG/DL (ref 8–27)
BUN/CREAT SERPL: 25 (ref 10–24)
CALCIUM SERPL-MCNC: 9.7 MG/DL (ref 8.6–10.2)
CHLORIDE SERPL-SCNC: 102 MMOL/L (ref 96–106)
CHOLEST SERPL-MCNC: 247 MG/DL (ref 100–199)
CO2 SERPL-SCNC: 25 MMOL/L (ref 20–29)
CREAT SERPL-MCNC: 0.81 MG/DL (ref 0.76–1.27)
EST. AVERAGE GLUCOSE BLD GHB EST-MCNC: 108 MG/DL
GLOBULIN SER CALC-MCNC: 2.3 G/DL (ref 1.5–4.5)
GLUCOSE SERPL-MCNC: 123 MG/DL (ref 65–99)
HBA1C MFR BLD: 5.4 % (ref 4.8–5.6)
HDLC SERPL-MCNC: 57 MG/DL
LDLC SERPL CALC-MCNC: 166 MG/DL (ref 0–99)
POTASSIUM SERPL-SCNC: 4.6 MMOL/L (ref 3.5–5.2)
PROT SERPL-MCNC: 6.8 G/DL (ref 6–8.5)
PSA SERPL-MCNC: 1.2 NG/ML (ref 0–4)
SODIUM SERPL-SCNC: 143 MMOL/L (ref 134–144)
TRIGL SERPL-MCNC: 122 MG/DL (ref 0–149)
VLDLC SERPL CALC-MCNC: 24 MG/DL (ref 5–40)

## 2020-08-28 RX ORDER — ROSUVASTATIN CALCIUM 10 MG/1
10 TABLET, COATED ORAL
Qty: 90 TAB | Refills: 1 | Status: SHIPPED | OUTPATIENT
Start: 2020-08-28 | End: 2021-02-19

## 2020-10-15 ENCOUNTER — VIRTUAL VISIT (OUTPATIENT)
Dept: INTERNAL MEDICINE CLINIC | Age: 75
End: 2020-10-15
Payer: MEDICARE

## 2020-10-15 DIAGNOSIS — J01.00 ACUTE NON-RECURRENT MAXILLARY SINUSITIS: Primary | ICD-10-CM

## 2020-10-15 PROCEDURE — G8510 SCR DEP NEG, NO PLAN REQD: HCPCS | Performed by: INTERNAL MEDICINE

## 2020-10-15 PROCEDURE — G0463 HOSPITAL OUTPT CLINIC VISIT: HCPCS | Performed by: INTERNAL MEDICINE

## 2020-10-15 PROCEDURE — 1100F PTFALLS ASSESS-DOCD GE2>/YR: CPT | Performed by: INTERNAL MEDICINE

## 2020-10-15 PROCEDURE — G8536 NO DOC ELDER MAL SCRN: HCPCS | Performed by: INTERNAL MEDICINE

## 2020-10-15 PROCEDURE — 99213 OFFICE O/P EST LOW 20 MIN: CPT | Performed by: INTERNAL MEDICINE

## 2020-10-15 PROCEDURE — G8427 DOCREV CUR MEDS BY ELIG CLIN: HCPCS | Performed by: INTERNAL MEDICINE

## 2020-10-15 PROCEDURE — G8417 CALC BMI ABV UP PARAM F/U: HCPCS | Performed by: INTERNAL MEDICINE

## 2020-10-15 PROCEDURE — 3288F FALL RISK ASSESSMENT DOCD: CPT | Performed by: INTERNAL MEDICINE

## 2020-10-15 PROCEDURE — G8756 NO BP MEASURE DOC: HCPCS | Performed by: INTERNAL MEDICINE

## 2020-10-15 PROCEDURE — G9711 PT HX TOT COL OR COLON CA: HCPCS | Performed by: INTERNAL MEDICINE

## 2020-10-15 RX ORDER — FLUTICASONE PROPIONATE 50 MCG
2 SPRAY, SUSPENSION (ML) NASAL DAILY
Qty: 1 BOTTLE | Refills: 1 | Status: SHIPPED | OUTPATIENT
Start: 2020-10-15 | End: 2021-08-11 | Stop reason: ALTCHOICE

## 2020-10-15 RX ORDER — AZITHROMYCIN 250 MG/1
250 TABLET, FILM COATED ORAL SEE ADMIN INSTRUCTIONS
Qty: 6 TAB | Refills: 0 | Status: SHIPPED | OUTPATIENT
Start: 2020-10-15 | End: 2020-10-20

## 2020-10-16 NOTE — PROGRESS NOTES
Taryn Garcia is a 69 y.o. male who was seen by synchronous (real-time) audio-video technology on 10/15/2020. Assessment & Plan:   Diagnoses and all orders for this visit:    1. Acute non-recurrent maxillary sinusitis - will treat with flonase and antibiotics. Tylenol as needed. Other orders  -     fluticasone propionate (FLONASE) 50 mcg/actuation nasal spray; 2 Sprays by Both Nostrils route daily. -     azithromycin (ZITHROMAX) 250 mg tablet; Take 1 Tab by mouth See Admin Instructions for 5 days. Subjective:   Taryn Garcia was seen for Cold Symptoms      Since last visit: N/A    6-day history of increasing nasal congestion that yellow and bloody in color with sinus pain and pressure. Some postnasal drip. No cough or sputum production. No shortness of breath or wheeze. No nausea or vomiting. 1 loose bowel movement today. His temperature has been as high as 101 last that high on Sunday. Prior to Admission medications    Medication Sig Start Date End Date Taking? Authorizing Provider   fluticasone propionate (FLONASE) 50 mcg/actuation nasal spray 2 Sprays by Both Nostrils route daily. 10/15/20  Yes Samuel Graham MD   azithromycin (ZITHROMAX) 250 mg tablet Take 1 Tab by mouth See Admin Instructions for 5 days. 10/15/20 10/20/20 Yes Samuel Graham MD   rosuvastatin (CRESTOR) 10 mg tablet Take 1 Tab by mouth nightly. 8/28/20  Yes Samuel Graham MD   Bifidobacterium Infantis (Align) 4 mg cap Take 1 Cap by mouth daily. 8/19/20  Yes Samuel Graham MD   amLODIPine (NORVASC) 5 mg tablet Take 1.5 Tabs by mouth daily. 8/5/20  Yes DiefenderBryson zabala NP   nebivoloL (BYSTOLIC) 20 mg tablet Take 1 Tab by mouth every evening. 8/5/20  Yes Jenise Vega NP   apixaban (Eliquis) 5 mg tablet Take 1 Tab by mouth two (2) times a day.  Indications: treatment to prevent blood clots in chronic atrial fibrillation 7/9/20  Yes Margareth Harmon MD cyanocobalamin, vitamin B-12, (VITAMIN B12 PO) Take  by mouth. Yes Provider, Historical   cholecalciferol, VITAMIN D3, (VITAMIN D3) 5,000 unit tab tablet Take  by mouth daily. Yes Provider, Historical   PSYLLIUM SEED, WITH DEXTROSE, (FIBER PO) Take  by mouth. Yes Provider, Historical   MULTIVITAMIN PO Take  by mouth. Takes one po daily. Yes Provider, Historical       Patient Active Problem List    Diagnosis Date Noted    Severe obesity (BMI 35.0-39. 9) [facility] 04/20/2018    Advance directive on file 10/07/2016    Open angle glaucoma suspect 10/02/2015    DJD (degenerative joint disease), lumbar 12/19/2014    Anemia 03/09/2011    Essential hypertension 01/20/2010    UC (ulcerative colitis) ([facility] 01/20/2010    Hypogonadism male      Current Outpatient Medications   Medication Sig Dispense Refill    fluticasone propionate (FLONASE) 50 mcg/actuation nasal spray 2 Sprays by Both Nostrils route daily. 1 Bottle 1    azithromycin (ZITHROMAX) 250 mg tablet Take 1 Tab by mouth See Admin Instructions for 5 days. 6 Tab 0    rosuvastatin (CRESTOR) 10 mg tablet Take 1 Tab by mouth nightly. 90 Tab 1    Bifidobacterium Infantis (Align) 4 mg cap Take 1 Cap by mouth daily.  amLODIPine (NORVASC) 5 mg tablet Take 1.5 Tabs by mouth daily. 45 Tab 2    nebivoloL (BYSTOLIC) 20 mg tablet Take 1 Tab by mouth every evening. 90 Tab 3    apixaban (Eliquis) 5 mg tablet Take 1 Tab by mouth two (2) times a day. Indications: treatment to prevent blood clots in chronic atrial fibrillation 180 Tab 3    cyanocobalamin, vitamin B-12, (VITAMIN B12 PO) Take  by mouth.  cholecalciferol, VITAMIN D3, (VITAMIN D3) 5,000 unit tab tablet Take  by mouth daily.  PSYLLIUM SEED, WITH DEXTROSE, (FIBER PO) Take  by mouth.  MULTIVITAMIN PO Take  by mouth. Takes one po daily.         Allergies   Allergen Reactions    No Known Allergies Other (comments)     Past Medical History:   Diagnosis Date    Allergic rhinitis     Arthritis     Back pain     Cancer ([facility] 2019    Basal cell - face    Chronic pain lower back    plus many other joints    History of basal cell carcinoma (BCC) of skin 2019    left side of face    Hypertension     Hypogonadism male     PUD (peptic ulcer disease)     UC (ulcerative colitis) ([facility] 2010    Ulcerative colitis     Unspecified essential hypertension 2010     Past Surgical History:   Procedure Laterality Date    HX COLECTOMY  2020    partial colectomy    HX COLONOSCOPY  2017    3 yr f/u - Dr. Michael Hill Left 2019    basal cell carcinoma left side of face.  HX TONSILLECTOMY      HX WISDOM TEETH EXTRACTION       Family History   Problem Relation Age of Onset    Cancer Mother         ovarian,colon    Diabetes Sister      Social History     Tobacco Use    Smoking status: Former Smoker     Packs/day: 1.00     Years: 30.00     Pack years: 30.00     Last attempt to quit: 1995     Years since quittin.8    Smokeless tobacco: Never Used    Tobacco comment: quit smoking cigarettes 20 yrs ago   Substance Use Topics    Alcohol use: Yes     Alcohol/week: 25.0 standard drinks     Types: 25 Glasses of wine per week     Frequency: 4 or more times a week     Drinks per session: 3 or 4     Binge frequency: Never       ROS - per HPI      Objective:     General: alert, cooperative, no distress   Mental  status: normal mood, behavior, speech, dress, motor activity, and thought processes, able to follow commands   Eyes: EOM intact, normal sclera   Mouth: mucous membranes moist   Neck: no visualized mass   Resp: normal effort and no respiratory distress   Neuro: no gross deficits   Musculoskeletal: normal ROM of neck   Skin: no discoloration or lesions of concern on visible areas   Psychiatric: normal affect, no hallucinations       We discussed the expected course, resolution and complications of the diagnosis(es) in detail. Medication risks, benefits, costs, interactions, and alternatives were discussed as indicated. I advised him to contact the office if his condition worsens, changes or fails to improve as anticipated. He expressed understanding with the diagnosis(es) and plan. Elba Simon is a 69 y.o. male who was evaluated by a video visit encounter for concerns as above. Patient identification was verified prior to start of the visit. A caregiver was present when appropriate. Due to this being a TeleHealth encounter (During XWSTS-62 public health emergency), evaluation of the following organ systems was limited: Vitals/Constitutional/EENT/Resp/CV/GI//MS/Neuro/Skin/Heme-Lymph-Imm. Pursuant to the emergency declaration under the [facility] waiver authority and the [employer] Act, this Virtual  Visit was conducted, with patient's (and/or legal guardian's) consent, to reduce the patient's risk of exposure to COVID-19 and provide necessary medical care. Services were provided through a synchronous discussion virtually to substitute for in-person clinic visit. I was in the office. The patient was at home.     Cheyenne Cleveland MD

## 2020-11-05 ENCOUNTER — OFFICE VISIT (OUTPATIENT)
Dept: CARDIOLOGY CLINIC | Age: 75
End: 2020-11-05
Payer: MEDICARE

## 2020-11-05 VITALS
DIASTOLIC BLOOD PRESSURE: 100 MMHG | SYSTOLIC BLOOD PRESSURE: 140 MMHG | BODY MASS INDEX: 35.04 KG/M2 | WEIGHT: 273 LBS | HEIGHT: 74 IN | OXYGEN SATURATION: 97 % | RESPIRATION RATE: 14 BRPM | HEART RATE: 100 BPM

## 2020-11-05 DIAGNOSIS — I48.0 PAROXYSMAL ATRIAL FIBRILLATION (HCC): Primary | ICD-10-CM

## 2020-11-05 DIAGNOSIS — I10 ESSENTIAL HYPERTENSION: ICD-10-CM

## 2020-11-05 DIAGNOSIS — E66.9 OBESITY, CLASS II, BMI 35-39.9, NO COMORBIDITY: ICD-10-CM

## 2020-11-05 DIAGNOSIS — I25.10 CORONARY ARTERY DISEASE INVOLVING NATIVE CORONARY ARTERY OF NATIVE HEART WITHOUT ANGINA PECTORIS: ICD-10-CM

## 2020-11-05 DIAGNOSIS — I48.91 ATRIAL FIBRILLATION, UNSPECIFIED TYPE (HCC): ICD-10-CM

## 2020-11-05 DIAGNOSIS — I48.0 PAROXYSMAL ATRIAL FIBRILLATION (HCC): ICD-10-CM

## 2020-11-05 PROCEDURE — G8536 NO DOC ELDER MAL SCRN: HCPCS | Performed by: INTERNAL MEDICINE

## 2020-11-05 PROCEDURE — G8417 CALC BMI ABV UP PARAM F/U: HCPCS | Performed by: INTERNAL MEDICINE

## 2020-11-05 PROCEDURE — 99214 OFFICE O/P EST MOD 30 MIN: CPT | Performed by: INTERNAL MEDICINE

## 2020-11-05 PROCEDURE — G8753 SYS BP > OR = 140: HCPCS | Performed by: INTERNAL MEDICINE

## 2020-11-05 PROCEDURE — G9711 PT HX TOT COL OR COLON CA: HCPCS | Performed by: INTERNAL MEDICINE

## 2020-11-05 PROCEDURE — G8427 DOCREV CUR MEDS BY ELIG CLIN: HCPCS | Performed by: INTERNAL MEDICINE

## 2020-11-05 PROCEDURE — G8510 SCR DEP NEG, NO PLAN REQD: HCPCS | Performed by: INTERNAL MEDICINE

## 2020-11-05 PROCEDURE — G8755 DIAS BP > OR = 90: HCPCS | Performed by: INTERNAL MEDICINE

## 2020-11-05 PROCEDURE — 1101F PT FALLS ASSESS-DOCD LE1/YR: CPT | Performed by: INTERNAL MEDICINE

## 2020-11-05 PROCEDURE — G0463 HOSPITAL OUTPT CLINIC VISIT: HCPCS | Performed by: INTERNAL MEDICINE

## 2020-11-05 NOTE — PROGRESS NOTES
[facility] [place]:   [phone]    HPI: Marylou Moore, a 77y.o. year-old who presents for follow up regarding AFib. He is feeling better since his cardioversion, feels like he is staying in rhythm. BP doing much better at home than it is today. 120/70-80 at home. No blood in the urine or stool, no new issues. Feels cold with the eliquis   LDL high last visit and can recheck. Will recheck cbc as well. No palpitations or chest pain  No dyspnea with exertion, no PND or orthopnea  No bleeding or unusual bruising on eliquis  Reviewed home BP readings and his BP was slightly elevated at home  Drinks 100 ml of wine daily - discussed association between etoh use and AFib and advised him to cut his etoh intake in half, he hsa not been able to do so  Has spinal stenosis, walks with a cane  No LE edema   He has a stationary bike and rides that daily, rides it 15 miles a day  RVE slightly on echo, consider DILLON evaluation in the future   No hx of varices on EGD 2-3 years ago as far as he knows. Assessment/Plan:  1. HTN - slightly elevated, continue bystolic, advised him to increase amlodipine to 7.5mg daily    2. Afib- s/p DCCV 7/27/20 and in NSR today, advised him to begin taking bystolic in the evening to improve fatigue  -[id number]=7 so will continue Eliquis 5mg BID, advised him to cut his daily intake of wine in half    3. Body mass index is 35.05 kg/m². 4. Dyslipidemia - , started on rosuvastatin due to recheck no upcoming PCP visits, lab slips given today.   5. UC- s/p colon resection, followed by Dr. Ronal Bernard      DCCV 7/27/20  Echo 6/20 - EF 55-60%, grade 1 dd, AV sclerosis, trace MR, PASP 27 mmHg    Soc Hx: remote hx tobacco use  Fhx sister with pacemaker, uncle with pacer    He  has a past medical history of Allergic rhinitis, Arthritis, Back pain, Cancer ([address] (2019), Chronic pain (lower back), History of basal cell carcinoma (BCC) of skin (07/01/2019), Hypertension, Hypogonadism male, PUD (peptic ulcer disease), UC (ulcerative colitis) ([facility] (1/20/2010), Ulcerative colitis, and Unspecified essential hypertension (1/20/2010). Cardiovascular ROS: no chest pain or palpitations   Respiratory ROS: no cough, shortness of breath, or wheezing  Neurological ROS: no TIA or stroke symptoms  All other systems negative except as above. PE  Vitals:    11/05/20 1358   BP: (!) 140/100   Pulse: 100   Resp: 14   SpO2: 97%   Weight: 273 lb (123.8 kg)   Height: 6' 2\" (1.88 m)    Body mass index is 35.05 kg/m².    General appearance - alert, well appearing, and in no distress   Mental status - affect appropriate to mood  Eyes - sclera anicteric, moist mucous membranes  Neck - supple, no significant adenopathy  Lymphatics - no  lymphadenopathy  Chest - clear to auscultation, no wheezes, rales or rhonchi  Heart - normal rate, regular rhythm, normal S1, S2, no murmurs, rubs, clicks or gallops  Abdomen - soft, nontender, nondistended  Back exam - full range of motion, no tenderness  Neurological - cranial nerves II through XII grossly intact, no focal deficit  Musculoskeletal - no muscular tenderness noted, normal strength  Extremities - peripheral pulses normal, no pedal edema  Skin - normal coloration  no rashes    Recent Labs:  Lab Results   Component Value Date/Time    Cholesterol, total 247 (H) 08/27/2020 09:19 AM    HDL Cholesterol 57 08/27/2020 09:19 AM    LDL, calculated 166 (H) 08/27/2020 09:19 AM    Triglyceride 122 08/27/2020 09:19 AM     Lab Results   Component Value Date/Time    Creatinine 0.81 08/27/2020 09:19 AM     Lab Results   Component Value Date/Time    BUN 20 08/27/2020 09:19 AM     Lab Results   Component Value Date/Time    Potassium 4.6 08/27/2020 09:19 AM     Lab Results   Component Value Date/Time    Hemoglobin A1c 5.4 08/27/2020 09:19 AM     Lab Results   Component Value Date/Time    HGB 14.9 07/27/2020 10:23 AM     Lab Results   Component Value Date/Time    PLATELET 733 (L) 2020 10:23 AM       Reviewed:  Past Medical History:   Diagnosis Date    Allergic rhinitis     Arthritis     Back pain     Cancer ([facility] 2019    Basal cell - face    Chronic pain lower back    plus many other joints    History of basal cell carcinoma (BCC) of skin 2019    left side of face    Hypertension     Hypogonadism male     PUD (peptic ulcer disease)     UC (ulcerative colitis) ([facility] 2010    Ulcerative colitis     Unspecified essential hypertension 2010     Social History     Tobacco Use   Smoking Status Former Smoker    Packs/day: 1.00    Years: 30.00    Pack years: 30.00    Last attempt to quit: 1995    Years since quittin.8   Smokeless Tobacco Never Used   Tobacco Comment    quit smoking cigarettes 20 yrs ago     Social History     Substance and Sexual Activity   Alcohol Use Yes    Alcohol/week: 25.0 standard drinks    Types: 25 Glasses of wine per week    Frequency: 4 or more times a week    Drinks per session: 3 or 4    Binge frequency: Never     Allergies   Allergen Reactions    No Known Allergies Other (comments)       Current Outpatient Medications   Medication Sig    fluticasone propionate (FLONASE) 50 mcg/actuation nasal spray 2 Sprays by Both Nostrils route daily.  rosuvastatin (CRESTOR) 10 mg tablet Take 1 Tab by mouth nightly.  Bifidobacterium Infantis (Align) 4 mg cap Take 1 Cap by mouth daily.  amLODIPine (NORVASC) 5 mg tablet Take 1.5 Tabs by mouth daily.  nebivoloL (BYSTOLIC) 20 mg tablet Take 1 Tab by mouth every evening.  apixaban (Eliquis) 5 mg tablet Take 1 Tab by mouth two (2) times a day. Indications: treatment to prevent blood clots in chronic atrial fibrillation    cyanocobalamin, vitamin B-12, (VITAMIN B12 PO) Take  by mouth.  cholecalciferol, VITAMIN D3, (VITAMIN D3) 5,000 unit tab tablet Take  by mouth daily.  PSYLLIUM SEED, WITH DEXTROSE, (FIBER PO) Take  by mouth.  MULTIVITAMIN PO Take  by mouth. Takes one po daily. No current facility-administered medications for this visit.         Rhina Nunes MD  [facility] heart and Vascular [place]  [facility] [facility] [address]  [address]

## 2020-11-17 ENCOUNTER — HOSPITAL ENCOUNTER (OUTPATIENT)
Dept: LAB | Age: 75
Discharge: HOME | End: 2020-11-17
Payer: MEDICARE

## 2020-11-17 PROCEDURE — 36415 COLL VENOUS BLD VENIPUNCTURE: CPT

## 2020-11-17 PROCEDURE — 83735 ASSAY OF MAGNESIUM: CPT

## 2020-11-17 PROCEDURE — 80061 LIPID PANEL: CPT

## 2020-11-17 PROCEDURE — 85027 COMPLETE CBC AUTOMATED: CPT

## 2020-11-18 LAB
CHOLEST SERPL-MCNC: 164 MG/DL (ref 100–199)
ERYTHROCYTE [DISTWIDTH] IN BLOOD BY AUTOMATED COUNT: 12.4 % (ref 11.6–15.4)
HCT VFR BLD AUTO: 43.3 % (ref 37.5–51)
HDLC SERPL-MCNC: 57 MG/DL
HGB BLD-MCNC: 15.1 G/DL (ref 13–17.7)
INTERPRETATION, 910389: NORMAL
LDLC SERPL CALC-MCNC: 79 MG/DL (ref 0–99)
MAGNESIUM SERPL-MCNC: 2 MG/DL (ref 1.6–2.3)
MCH RBC QN AUTO: 35 PG (ref 26.6–33)
MCHC RBC AUTO-ENTMCNC: 34.9 G/DL (ref 31.5–35.7)
MCV RBC AUTO: 101 FL (ref 79–97)
PLATELET # BLD AUTO: 143 X10E3/UL (ref 150–450)
RBC # BLD AUTO: 4.31 X10E6/UL (ref 4.14–5.8)
TRIGL SERPL-MCNC: 166 MG/DL (ref 0–149)
VLDLC SERPL CALC-MCNC: 28 MG/DL (ref 5–40)
WBC # BLD AUTO: 5.5 X10E3/UL (ref 3.4–10.8)

## 2020-11-18 RX ORDER — AMLODIPINE BESYLATE 5 MG/1
TABLET ORAL
Qty: 45 TAB | Refills: 1 | Status: SHIPPED | OUTPATIENT
Start: 2020-11-18 | End: 2021-01-18

## 2021-01-29 ENCOUNTER — IMMUNIZATION (OUTPATIENT)
Dept: INTERNAL MEDICINE CLINIC | Age: 76
End: 2021-01-29
Payer: MEDICARE

## 2021-01-29 DIAGNOSIS — Z23 ENCOUNTER FOR IMMUNIZATION: Primary | ICD-10-CM

## 2021-01-29 PROCEDURE — 91301 COVID-19, MRNA, LNP-S, PF, 100MCG/0.5ML DOSE(MODERNA): CPT | Performed by: FAMILY MEDICINE

## 2021-01-29 PROCEDURE — 0011A PR IMM ADMN SARSCOV2 100 MCG/0.5 ML 1ST DOSE: CPT | Performed by: FAMILY MEDICINE

## 2021-02-19 RX ORDER — ROSUVASTATIN CALCIUM 10 MG/1
TABLET, COATED ORAL
Qty: 90 TAB | Refills: 0 | Status: SHIPPED | OUTPATIENT
Start: 2021-02-19 | End: 2021-02-25

## 2021-02-25 RX ORDER — ROSUVASTATIN CALCIUM 10 MG/1
TABLET, COATED ORAL
Qty: 90 TAB | Refills: 0 | Status: SHIPPED | OUTPATIENT
Start: 2021-02-25 | End: 2021-05-19

## 2021-02-26 ENCOUNTER — IMMUNIZATION (OUTPATIENT)
Dept: INTERNAL MEDICINE CLINIC | Age: 76
End: 2021-02-26
Payer: MEDICARE

## 2021-02-26 DIAGNOSIS — Z23 ENCOUNTER FOR IMMUNIZATION: Primary | ICD-10-CM

## 2021-02-26 PROCEDURE — 91301 COVID-19, MRNA, LNP-S, PF, 100MCG/0.5ML DOSE(MODERNA): CPT | Performed by: FAMILY MEDICINE

## 2021-02-26 PROCEDURE — 0012A COVID-19, MRNA, LNP-S, PF, 100MCG/0.5ML DOSE(MODERNA): CPT | Performed by: FAMILY MEDICINE

## 2021-04-02 RX ORDER — VALSARTAN 160 MG/1
160 TABLET ORAL DAILY
Qty: 90 TAB | Refills: 3 | Status: SHIPPED
Start: 2021-04-02 | End: 2021-05-05 | Stop reason: ALTCHOICE

## 2021-04-05 ENCOUNTER — PATIENT MESSAGE (OUTPATIENT)
Dept: CARDIOLOGY CLINIC | Age: 76
End: 2021-04-05

## 2021-05-05 ENCOUNTER — OFFICE VISIT (OUTPATIENT)
Dept: CARDIOLOGY CLINIC | Age: 76
End: 2021-05-05
Payer: MEDICARE

## 2021-05-05 VITALS
OXYGEN SATURATION: 98 % | WEIGHT: 274 LBS | SYSTOLIC BLOOD PRESSURE: 170 MMHG | BODY MASS INDEX: 35.16 KG/M2 | DIASTOLIC BLOOD PRESSURE: 80 MMHG | HEIGHT: 74 IN | HEART RATE: 101 BPM

## 2021-05-05 DIAGNOSIS — E66.9 OBESITY, CLASS II, BMI 35-39.9, NO COMORBIDITY: ICD-10-CM

## 2021-05-05 DIAGNOSIS — I25.10 CORONARY ARTERY DISEASE INVOLVING NATIVE CORONARY ARTERY OF NATIVE HEART WITHOUT ANGINA PECTORIS: ICD-10-CM

## 2021-05-05 DIAGNOSIS — I48.91 ATRIAL FIBRILLATION, UNSPECIFIED TYPE (HCC): ICD-10-CM

## 2021-05-05 DIAGNOSIS — I10 ESSENTIAL HYPERTENSION: ICD-10-CM

## 2021-05-05 DIAGNOSIS — I48.0 PAROXYSMAL ATRIAL FIBRILLATION (HCC): Primary | ICD-10-CM

## 2021-05-05 DIAGNOSIS — R06.02 SHORTNESS OF BREATH: ICD-10-CM

## 2021-05-05 PROCEDURE — G8417 CALC BMI ABV UP PARAM F/U: HCPCS | Performed by: INTERNAL MEDICINE

## 2021-05-05 PROCEDURE — 99214 OFFICE O/P EST MOD 30 MIN: CPT | Performed by: INTERNAL MEDICINE

## 2021-05-05 PROCEDURE — G9711 PT HX TOT COL OR COLON CA: HCPCS | Performed by: INTERNAL MEDICINE

## 2021-05-05 PROCEDURE — G8754 DIAS BP LESS 90: HCPCS | Performed by: INTERNAL MEDICINE

## 2021-05-05 PROCEDURE — G8536 NO DOC ELDER MAL SCRN: HCPCS | Performed by: INTERNAL MEDICINE

## 2021-05-05 PROCEDURE — G0463 HOSPITAL OUTPT CLINIC VISIT: HCPCS | Performed by: INTERNAL MEDICINE

## 2021-05-05 PROCEDURE — G8432 DEP SCR NOT DOC, RNG: HCPCS | Performed by: INTERNAL MEDICINE

## 2021-05-05 PROCEDURE — 1101F PT FALLS ASSESS-DOCD LE1/YR: CPT | Performed by: INTERNAL MEDICINE

## 2021-05-05 PROCEDURE — G8427 DOCREV CUR MEDS BY ELIG CLIN: HCPCS | Performed by: INTERNAL MEDICINE

## 2021-05-05 PROCEDURE — G8753 SYS BP > OR = 140: HCPCS | Performed by: INTERNAL MEDICINE

## 2021-05-05 RX ORDER — HYDRALAZINE HYDROCHLORIDE 25 MG/1
50 TABLET, FILM COATED ORAL 2 TIMES DAILY
Qty: 180 TAB | Refills: 3 | Status: SHIPPED
Start: 2021-05-05 | End: 2021-06-03 | Stop reason: ALTCHOICE

## 2021-05-05 NOTE — PROGRESS NOTES
[facility] [place]:   [phone]    HPI: Morris Trimble, a 77y.o. year-old who presents for follow up regarding AFib. Annual physical in August,   Doesn't like valsartan makes him feel bad, dizzy lightheaded ill. Reviewed his BP and symptom diary for me. He wants to do more walking but is just limited by the dizziness for now. A bi of swelling here and there. Does the stationary bike 15 miles for an hour most days. While watching the news. No trouble doing the bike but has that illness with standing. Will need to look again at his carotids, old PAD form 2104 doppler. No blbocker due to fatigue and depression as side effects, tired atenolol, coreg and bystolic. Previosuly tried lisinopril and hctz  Needs to evaluate carotid stenosis and look for CAD progression     He was feeling better after his cardioversion last year felt like he was staying in rhythm. He is back in A. fib on exam today but cannot tell symptomatically that he is. He does not feel flutters or palpitations. There is always a question of whether the A. fib is contributing to his dizziness but I think we will proceed with the other medication changes and carotid evaluation as above before coming back around to whether we need further intervention for the A. fib. BP doing much better at home than it is today. 120/70-80 at home. No blood in the urine or stool, no new issues. Feels cold with the eliquis   LDL high last visit and can recheck. Will recheck cbc as well.      No palpitations or chest pain  No dyspnea with exertion, no PND or orthopnea  No bleeding or unusual bruising on eliquis  Reviewed home BP readings and his BP was slightly elevated at home  Drinks 500 ml of wine daily - discussed association between etoh use and AFib and advised him to cut his etoh intake in half, he has not been able to do so  Has spinal stenosis, walks with a cane  No LE edema   He has a stationary bike and rides that daily, rides it 15 miles a day  RVE slightly on echo, consider DILLON evaluation in the future   No hx of varices on EGD 2-3 years ago as far as he knows. Assessment/Plan:  1. HTN - slightly elevated, continue bystolic, advised him to increase amlodipine to 7.5mg daily    2. Afib- s/p DCCV 7/27/20 and in NSR today, advised him to begin taking bystolic in the evening to improve fatigue  -[id number] so will continue Eliquis 5mg BID, advised him to cut his daily intake of wine in half    3. Body mass index is 35.18 kg/m². 4. Dyslipidemia - , started on rosuvastatin ->abs done in August and cholesterol in November was 79 for his LDL triglycerides 166 total cholesterol 164 so at goal  5. UC- s/p colon resection, followed by Dr. Kobe Ayon      DCCV 7/27/20  Echo 6/20 - EF 55-60%, grade 1 dd, AV sclerosis, trace MR, PASP 27 mmHg    Soc Hx: remote hx tobacco use  Fhx sister with pacemaker, uncle with pacer    He  has a past medical history of Allergic rhinitis, Arthritis, Back pain, Cancer ([facility] (2019), Chronic pain (lower back), History of basal cell carcinoma (BCC) of skin (07/01/2019), Hypertension, Hypogonadism male, PUD (peptic ulcer disease), UC (ulcerative colitis) ([facility] (1/20/2010), Ulcerative colitis, and Unspecified essential hypertension (1/20/2010). Cardiovascular ROS: no chest pain or palpitations   Respiratory ROS: no cough, shortness of breath, or wheezing  Neurological ROS: no TIA or stroke symptoms  All other systems negative except as above. PE  Vitals:    05/05/21 0835 05/05/21 0838   BP: (!) 170/60 (!) 170/80   Pulse: (!) 101    SpO2: 98%    Weight: 274 lb (124.3 kg)    Height: 6' 2\" (1.88 m)     Body mass index is 35.18 kg/m².    General appearance - alert, well appearing, and in no distress   Mental status - affect appropriate to mood  Eyes - sclera anicteric, moist mucous membranes  Neck - supple, no significant adenopathy  Lymphatics - no  lymphadenopathy  Chest - clear to auscultation, no wheezes, rales or rhonchi  Heart - normal rate, regular rhythm, normal S1, S2, no murmurs, rubs, clicks or gallops  Abdomen - soft, nontender, nondistended  Back exam - full range of motion, no tenderness  Neurological - cranial nerves II through XII grossly intact, no focal deficit  Musculoskeletal - no muscular tenderness noted, normal strength  Extremities - peripheral pulses normal, no pedal edema  Skin - normal coloration  no rashes    Recent Labs:  Lab Results   Component Value Date/Time    Cholesterol, total 164 2020 09:06 AM    HDL Cholesterol 57 2020 09:06 AM    LDL, calculated 79 2020 09:06 AM    LDL, calculated 166 (H) 2020 09:19 AM    Triglyceride 166 (H) 2020 09:06 AM     Lab Results   Component Value Date/Time    Creatinine 0.81 2020 09:19 AM     Lab Results   Component Value Date/Time    BUN 20 2020 09:19 AM     Lab Results   Component Value Date/Time    Potassium 4.6 2020 09:19 AM     Lab Results   Component Value Date/Time    Hemoglobin A1c 5.4 2020 09:19 AM     Lab Results   Component Value Date/Time    HGB 15.1 2020 09:06 AM     Lab Results   Component Value Date/Time    PLATELET 090 (L)  09:06 AM       Reviewed:  Past Medical History:   Diagnosis Date    Allergic rhinitis     Arthritis     Back pain     [facility]    Basal cell - face    Chronic pain lower back    plus many other joints    History of basal cell carcinoma (BCC) of skin 2019    left side of face    Hypertension     Hypogonadism male     PUD (peptic ulcer disease)     [facility] 2010    Ulcerative colitis     Unspecified essential hypertension 2010     Social History     Tobacco Use   Smoking Status Former Smoker    Packs/day: 1.00    Years: 30.00    Pack years: 30.00    Quit date: 1995    Years since quittin.3   Smokeless Tobacco Never Used   Tobacco Comment    quit smoking cigarettes 20 yrs ago     Social History     Substance and Sexual Activity   Alcohol Use Yes    Alcohol/week: 25.0 standard drinks    Types: 25 Glasses of wine per week    Frequency: 4 or more times a week    Drinks per session: 3 or 4    Binge frequency: Never     Allergies   Allergen Reactions    No Known Allergies Other (comments)       Current Outpatient Medications   Medication Sig    valsartan (DIOVAN) 160 mg tablet Take 1 Tab by mouth daily.  rosuvastatin (CRESTOR) 10 mg tablet TAKE ONE TABLET BY MOUTH ONCE NIGHTLY    amLODIPine (NORVASC) 5 mg tablet TAKE 1 AND 1/2 TABLET BY MOUTH DAILY    Bifidobacterium Infantis (Align) 4 mg cap Take 1 Cap by mouth daily.  apixaban (Eliquis) 5 mg tablet Take 1 Tab by mouth two (2) times a day. Indications: treatment to prevent blood clots in chronic atrial fibrillation    cyanocobalamin, vitamin B-12, (VITAMIN B12 PO) Take  by mouth.  cholecalciferol, VITAMIN D3, (VITAMIN D3) 5,000 unit tab tablet Take  by mouth daily.  PSYLLIUM SEED, WITH DEXTROSE, (FIBER PO) Take  by mouth.  MULTIVITAMIN PO Take  by mouth. Takes one po daily.  fluticasone propionate (FLONASE) 50 mcg/actuation nasal spray 2 Sprays by Both Nostrils route daily. No current facility-administered medications for this visit.         MD Emmanuelle Welch [facility] [place]  [facility] [address] [facility]  [address]

## 2021-05-05 NOTE — LETTER
5/5/2021 Patient: Primitivo Siddiqui YOB: 1945 Date of Visit: 5/5/2021 Nikolas Kiser MD 
[address] [place] [address] [address] [facility] [phone] [address] [employer] Dear Nikolas Kiser MD, Thank you for referring Mr. David Mc to [facility] for evaluation. My notes for this consultation are attached. If you have questions, please do not hesitate to call me. I look forward to following your patient along with you. Sincerely, Martín Perez MD

## 2021-05-19 RX ORDER — ROSUVASTATIN CALCIUM 10 MG/1
TABLET, COATED ORAL
Qty: 90 TABLET | Refills: 0 | Status: SHIPPED | OUTPATIENT
Start: 2021-05-19 | End: 2021-08-16

## 2021-06-08 ENCOUNTER — ANCILLARY PROCEDURE (OUTPATIENT)
Dept: CARDIOLOGY CLINIC | Age: 76
End: 2021-06-08
Payer: MEDICARE

## 2021-06-08 VITALS
BODY MASS INDEX: 35.16 KG/M2 | WEIGHT: 274 LBS | HEIGHT: 74 IN | DIASTOLIC BLOOD PRESSURE: 68 MMHG | SYSTOLIC BLOOD PRESSURE: 128 MMHG

## 2021-06-08 DIAGNOSIS — I48.91 ATRIAL FIBRILLATION, UNSPECIFIED TYPE (HCC): ICD-10-CM

## 2021-06-08 DIAGNOSIS — I10 ESSENTIAL HYPERTENSION: ICD-10-CM

## 2021-06-08 DIAGNOSIS — I25.10 CORONARY ARTERY DISEASE INVOLVING NATIVE CORONARY ARTERY OF NATIVE HEART WITHOUT ANGINA PECTORIS: ICD-10-CM

## 2021-06-08 DIAGNOSIS — E66.01 SEVERE OBESITY (BMI 35.0-39.9) WITH COMORBIDITY (HCC): ICD-10-CM

## 2021-06-08 DIAGNOSIS — I48.0 PAROXYSMAL ATRIAL FIBRILLATION (HCC): ICD-10-CM

## 2021-06-08 DIAGNOSIS — R06.02 SHORTNESS OF BREATH: ICD-10-CM

## 2021-06-08 DIAGNOSIS — E66.9 OBESITY, CLASS II, BMI 35-39.9, NO COMORBIDITY: ICD-10-CM

## 2021-06-08 PROCEDURE — 78452 HT MUSCLE IMAGE SPECT MULT: CPT | Performed by: INTERNAL MEDICINE

## 2021-06-08 PROCEDURE — 93016 CV STRESS TEST SUPVJ ONLY: CPT | Performed by: INTERNAL MEDICINE

## 2021-06-08 PROCEDURE — 93018 CV STRESS TEST I&R ONLY: CPT | Performed by: INTERNAL MEDICINE

## 2021-06-08 PROCEDURE — 93017 CV STRESS TEST TRACING ONLY: CPT | Performed by: INTERNAL MEDICINE

## 2021-06-08 PROCEDURE — A9500 TC99M SESTAMIBI: HCPCS | Performed by: INTERNAL MEDICINE

## 2021-06-08 RX ORDER — AMINOPHYLLINE 25 MG/ML
100 INJECTION, SOLUTION INTRAVENOUS ONCE
Status: COMPLETED | OUTPATIENT
Start: 2021-06-08 | End: 2021-06-08

## 2021-06-08 RX ORDER — TETRAKIS(2-METHOXYISOBUTYLISOCYANIDE)COPPER(I) TETRAFLUOROBORATE 1 MG/ML
40 INJECTION, POWDER, LYOPHILIZED, FOR SOLUTION INTRAVENOUS ONCE
Status: COMPLETED | OUTPATIENT
Start: 2021-06-08 | End: 2021-06-08

## 2021-06-08 RX ADMIN — AMINOPHYLLINE 100 MG: 25 INJECTION, SOLUTION INTRAVENOUS at 09:00

## 2021-06-08 RX ADMIN — TETRAKIS(2-METHOXYISOBUTYLISOCYANIDE)COPPER(I) TETRAFLUOROBORATE 25.5 MILLICURIE: 1 INJECTION, POWDER, LYOPHILIZED, FOR SOLUTION INTRAVENOUS at 08:55

## 2021-06-08 RX ADMIN — REGADENOSON 0.4 MG: 0.08 INJECTION, SOLUTION INTRAVENOUS at 08:55

## 2021-06-10 ENCOUNTER — APPOINTMENT (OUTPATIENT)
Dept: CARDIOLOGY CLINIC | Age: 76
End: 2021-06-10
Payer: MEDICARE

## 2021-06-10 ENCOUNTER — ANCILLARY PROCEDURE (OUTPATIENT)
Dept: CARDIOLOGY CLINIC | Age: 76
End: 2021-06-10
Payer: MEDICARE

## 2021-06-10 ENCOUNTER — TELEPHONE (OUTPATIENT)
Dept: CARDIOLOGY CLINIC | Age: 76
End: 2021-06-10

## 2021-06-10 LAB
STRESS BASELINE DIAS BP: 68 MMHG
STRESS BASELINE HR: 86 BPM
STRESS BASELINE SYS BP: 128 MMHG
STRESS O2 SAT PEAK: 96 %
STRESS O2 SAT REST: 97 %
STRESS PEAK DIAS BP: 56 MMHG
STRESS PEAK SYS BP: 96 MMHG
STRESS PERCENT HR ACHIEVED: 73 %
STRESS POST PEAK HR: 107 BPM
STRESS RATE PRESSURE PRODUCT: NORMAL BPM*MMHG
STRESS TARGET HR: 146 BPM

## 2021-06-10 PROCEDURE — 93880 EXTRACRANIAL BILAT STUDY: CPT | Performed by: INTERNAL MEDICINE

## 2021-06-10 RX ORDER — TETRAKIS(2-METHOXYISOBUTYLISOCYANIDE)COPPER(I) TETRAFLUOROBORATE 1 MG/ML
40 INJECTION, POWDER, LYOPHILIZED, FOR SOLUTION INTRAVENOUS ONCE
Status: COMPLETED | OUTPATIENT
Start: 2021-06-10 | End: 2021-06-10

## 2021-06-10 RX ADMIN — TECHNETIUM TC 99M SESTAMIBI 25.9 MILLICURIE: 1 INJECTION INTRAVENOUS at 09:35

## 2021-06-10 NOTE — TELEPHONE ENCOUNTER
MD Kell Kerr MD; Eunice Estrada NP; Jessica Willard RN  Stress test on patient of yours was normal      [facility] sent to patient.

## 2021-06-13 LAB
LEFT CCA DIST DIAS: 16.3 CENTIMETER/SECOND
LEFT CCA DIST SYS: 89.9 CENTIMETER/SECOND
LEFT CCA PROX DIAS: 11 CENTIMETER/SECOND
LEFT CCA PROX SYS: 94.7 CENTIMETER/SECOND
LEFT ECA DIAS: 9.86 CENTIMETER/SECOND
LEFT ECA SYS: 99 CENTIMETER/SECOND
LEFT ICA DIST DIAS: 20.5 CENTIMETER/SECOND
LEFT ICA DIST SYS: 64.8 CENTIMETER/SECOND
LEFT ICA MID DIAS: 8.6 CENTIMETER/SECOND
LEFT ICA MID SYS: 63.2 CENTIMETER/SECOND
LEFT ICA PROX DIAS: 18.1 CENTIMETER/SECOND
LEFT ICA PROX SYS: 68.6 CENTIMETER/SECOND
LEFT ICA/CCA SYS: 0.72
LEFT VERTEBRAL DIAS: 9.7 CENTIMETER/SECOND
LEFT VERTEBRAL SYS: 35.5 CENTIMETER/SECOND
RIGHT CCA DIST DIAS: 20 CENTIMETER/SECOND
RIGHT CCA DIST SYS: 102 CENTIMETER/SECOND
RIGHT CCA PROX DIAS: 14.3 CENTIMETER/SECOND
RIGHT CCA PROX SYS: 101.7 CENTIMETER/SECOND
RIGHT ECA DIAS: 9.09 CENTIMETER/SECOND
RIGHT ECA SYS: 120.8 CENTIMETER/SECOND
RIGHT ICA DIST DIAS: 15.3 CENTIMETER/SECOND
RIGHT ICA DIST SYS: 59.5 CENTIMETER/SECOND
RIGHT ICA MID DIAS: 15.4 CENTIMETER/SECOND
RIGHT ICA MID SYS: 82.8 CENTIMETER/SECOND
RIGHT ICA PROX DIAS: 16.1 CENTIMETER/SECOND
RIGHT ICA PROX SYS: 73.1 CENTIMETER/SECOND
RIGHT ICA/CCA SYS: 0.8
RIGHT VERTEBRAL DIAS: 16 CENTIMETER/SECOND
RIGHT VERTEBRAL SYS: 51.6 CENTIMETER/SECOND

## 2021-06-21 RX ORDER — APIXABAN 5 MG/1
TABLET, FILM COATED ORAL
Qty: 180 TABLET | Refills: 2 | Status: SHIPPED | OUTPATIENT
Start: 2021-06-21

## 2021-06-21 NOTE — TELEPHONE ENCOUNTER
Requested Prescriptions     Signed Prescriptions Disp Refills    Eliquis 5 mg tablet 180 Tablet 2     Sig: TAKE ONE TABLET BY MOUTH TWICE A DAY     Authorizing Provider: Raj Smith     Ordering User: Lani aPrk     Per verbal orders

## 2021-08-06 ENCOUNTER — PATIENT MESSAGE (OUTPATIENT)
Dept: CARDIOLOGY CLINIC | Age: 76
End: 2021-08-06

## 2021-08-06 RX ORDER — EPLERENONE 25 MG/1
25 TABLET, FILM COATED ORAL DAILY
Qty: 90 TABLET | Refills: 1 | Status: SHIPPED | OUTPATIENT
Start: 2021-08-06 | End: 2021-08-11 | Stop reason: ALTCHOICE

## 2021-08-06 NOTE — TELEPHONE ENCOUNTER
Requested Prescriptions     Signed Prescriptions Disp Refills    eplerenone (Inspra) 25 mg tablet 90 Tablet 1     Sig: Take 1 Tablet by mouth daily.      Authorizing Provider: nithya Floor     Ordering User: Kellie Jade     Per verbal orders

## 2021-08-11 ENCOUNTER — OFFICE VISIT (OUTPATIENT)
Dept: INTERNAL MEDICINE CLINIC | Age: 76
End: 2021-08-11
Payer: MEDICARE

## 2021-08-11 VITALS
OXYGEN SATURATION: 94 % | RESPIRATION RATE: 18 BRPM | DIASTOLIC BLOOD PRESSURE: 82 MMHG | BODY MASS INDEX: 35.29 KG/M2 | TEMPERATURE: 98.4 F | SYSTOLIC BLOOD PRESSURE: 140 MMHG | WEIGHT: 275 LBS | HEART RATE: 97 BPM | HEIGHT: 74 IN

## 2021-08-11 DIAGNOSIS — M47.26 OSTEOARTHRITIS OF SPINE WITH RADICULOPATHY, LUMBAR REGION: ICD-10-CM

## 2021-08-11 DIAGNOSIS — I10 ESSENTIAL HYPERTENSION: ICD-10-CM

## 2021-08-11 DIAGNOSIS — K51.319 ULCERATIVE RECTOSIGMOIDITIS WITH COMPLICATION (HCC): ICD-10-CM

## 2021-08-11 DIAGNOSIS — E66.01 SEVERE OBESITY (BMI 35.0-39.9) WITH COMORBIDITY (HCC): ICD-10-CM

## 2021-08-11 DIAGNOSIS — H40.009 OPEN ANGLE GLAUCOMA SUSPECT: ICD-10-CM

## 2021-08-11 DIAGNOSIS — E29.1 HYPOGONADISM MALE: ICD-10-CM

## 2021-08-11 DIAGNOSIS — R53.83 FATIGUE, UNSPECIFIED TYPE: ICD-10-CM

## 2021-08-11 DIAGNOSIS — Z13.6 ENCOUNTER FOR ABDOMINAL AORTIC ANEURYSM (AAA) SCREENING: ICD-10-CM

## 2021-08-11 DIAGNOSIS — R73.01 FASTING HYPERGLYCEMIA: ICD-10-CM

## 2021-08-11 DIAGNOSIS — E78.2 MIXED HYPERLIPIDEMIA: ICD-10-CM

## 2021-08-11 DIAGNOSIS — D64.9 ANEMIA, UNSPECIFIED TYPE: ICD-10-CM

## 2021-08-11 DIAGNOSIS — Z12.5 PROSTATE CANCER SCREENING: ICD-10-CM

## 2021-08-11 DIAGNOSIS — Z00.00 MEDICARE ANNUAL WELLNESS VISIT, SUBSEQUENT: Primary | ICD-10-CM

## 2021-08-11 PROBLEM — F33.1 MAJOR DEPRESSIVE DISORDER, RECURRENT, MODERATE (HCC): Status: ACTIVE | Noted: 2021-08-11

## 2021-08-11 PROCEDURE — G9711 PT HX TOT COL OR COLON CA: HCPCS | Performed by: INTERNAL MEDICINE

## 2021-08-11 PROCEDURE — G0439 PPPS, SUBSEQ VISIT: HCPCS | Performed by: INTERNAL MEDICINE

## 2021-08-11 PROCEDURE — G8427 DOCREV CUR MEDS BY ELIG CLIN: HCPCS | Performed by: INTERNAL MEDICINE

## 2021-08-11 PROCEDURE — G8510 SCR DEP NEG, NO PLAN REQD: HCPCS | Performed by: INTERNAL MEDICINE

## 2021-08-11 PROCEDURE — G8754 DIAS BP LESS 90: HCPCS | Performed by: INTERNAL MEDICINE

## 2021-08-11 PROCEDURE — 1101F PT FALLS ASSESS-DOCD LE1/YR: CPT | Performed by: INTERNAL MEDICINE

## 2021-08-11 PROCEDURE — G0463 HOSPITAL OUTPT CLINIC VISIT: HCPCS | Performed by: INTERNAL MEDICINE

## 2021-08-11 PROCEDURE — G8753 SYS BP > OR = 140: HCPCS | Performed by: INTERNAL MEDICINE

## 2021-08-11 PROCEDURE — 99214 OFFICE O/P EST MOD 30 MIN: CPT | Performed by: INTERNAL MEDICINE

## 2021-08-11 PROCEDURE — G8417 CALC BMI ABV UP PARAM F/U: HCPCS | Performed by: INTERNAL MEDICINE

## 2021-08-11 PROCEDURE — G8536 NO DOC ELDER MAL SCRN: HCPCS | Performed by: INTERNAL MEDICINE

## 2021-08-11 RX ORDER — TOLTERODINE 4 MG/1
4 CAPSULE, EXTENDED RELEASE ORAL DAILY
Qty: 30 CAPSULE | Refills: 1 | Status: SHIPPED | OUTPATIENT
Start: 2021-08-11 | End: 2022-03-22

## 2021-08-11 RX ORDER — BUPROPION HYDROCHLORIDE 150 MG/1
150 TABLET ORAL
Qty: 30 TABLET | Refills: 0 | Status: SHIPPED | OUTPATIENT
Start: 2021-08-11 | End: 2021-09-16

## 2021-08-11 NOTE — PATIENT INSTRUCTIONS
Medicare Wellness Visit, Male    The best way to live healthy is to have a lifestyle where you eat a well-balanced diet, exercise regularly, limit alcohol use, and quit all forms of tobacco/nicotine, if applicable. Regular preventive services are another way to keep healthy. Preventive services (vaccines, screening tests, monitoring & exams) can help personalize your care plan, which helps you manage your own care. Screening tests can find health problems at the earliest stages, when they are easiest to treat. [facility] follows the current, evidence-based guidelines published by the [facility] ([facility]) when recommending preventive services for our patients. Because we follow these guidelines, sometimes recommendations change over time as research supports it. (For example, a prostate screening blood test is no longer routinely recommended for men with no symptoms). Of course, you and your doctor may decide to screen more often for some diseases, based on your risk and co-morbidities (chronic disease you are already diagnosed with). Preventive services for you include:  - Medicare offers their members a free annual wellness visit, which is time for you and your primary care provider to discuss and plan for your preventive service needs. Take advantage of this benefit every year!  -All adults over age 72 should receive the recommended pneumonia vaccines. Current USPSTF guidelines recommend a series of two vaccines for the best pneumonia protection.   -All adults should have a flu vaccine yearly and tetanus vaccine every 10 years.  -All adults age 48 and older should receive the shingles vaccines (series of two vaccines).        -All adults age 38-68 who are overweight should have a diabetes screening test once every three years.   -Other screening tests & preventive services for persons with diabetes include: an eye exam to screen for diabetic retinopathy, a kidney function test, a foot exam, and stricter control over your cholesterol.   -Cardiovascular screening for adults with routine risk involves an electrocardiogram (ECG) at intervals determined by the provider.   -Colorectal cancer screening should be done for adults age 54-65 with no increased risk factors for colorectal cancer. There are a number of acceptable methods of screening for this type of cancer. Each test has its own benefits and drawbacks. Discuss with your provider what is most appropriate for you during your annual wellness visit. The different tests include: colonoscopy (considered the best screening method), a fecal occult blood test, a fecal DNA test, and sigmoidoscopy.  -All adults born between [facility] should be screened once for Hepatitis C.  -An Abdominal Aortic Aneurysm (AAA) Screening is recommended for men age 73-68 who has ever smoked in their lifetime.      Here is a list of your current Health Maintenance items (your personalized list of preventive services) with a due date:  Health Maintenance Due   Topic Date Due    AAA Screening  Never done

## 2021-08-11 NOTE — PROGRESS NOTES
This is the Subsequent Medicare Annual Wellness Exam, performed 12 months or more after the Initial AWV or the last Subsequent AWV    I have reviewed the patient's medical history in detail and updated the computerized patient record. Also for follow-up of his health issues. Overall his blood pressures been under reasonable control. He does have some dizziness on standing at times. His back pain is under reasonable control currently and is using a walker to ambulate. He does feel lack of motivation. He feels anxious and somewhat depressed at times. Sleep continues to be a problem. He has had issues with urinary frequency and some occasional incontinence. No dysuria or hematuria. No fevers or chills. No sweats. ROS - Per HPI    Physical Examination: General appearance - alert, well appearing, and in no distress  Ears - bilateral TM's and external ear canals normal  Nose - normal and patent, no erythema, discharge or polyps  Mouth - mucous membranes moist, pharynx normal without lesions  Neck - supple, no significant adenopathy  Lymphatics - no palpable lymphadenopathy, no hepatosplenomegaly  Chest - clear to auscultation, no wheezes, rales or rhonchi, symmetric air entry  Heart - irregularly irregular rhythm  Abdomen - soft, nontender, nondistended, no masses or organomegaly  Neurological - alert, oriented, normal speech, no focal findings or movement disorder noted, motor and sensory grossly normal bilaterally  Musculoskeletal - no joint tenderness, deformity or swelling  Extremities - peripheral pulses normal, no pedal edema, no clubbing or cyanosis        Assessment/Plan   Education and counseling provided:  Are appropriate based on today's review and evaluation  End-of-Life planning (with patient's consent)  Influenza Vaccine  Prostate cancer screening tests (PSA, covered annually)  Diabetes screening test    1. 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   We will continue his current meds and follow-up with GI.  -     METABOLIC PANEL, COMPREHENSIVE; Future  -     CBC WITH AUTOMATED DIFF; Future  -     TSH 3RD GENERATION; Future  2. Essential hypertensionblood pressure well controlled. Continue current medications for that. -     LIPID PANEL; Future  -     TSH 3RD GENERATION; Future  -     URINALYSIS W/MICROSCOPIC; Future  3. Hypogonadism maleappears that this may be part of his issue with fatigue. At this point we will not provide treatment for this due to BPH and prostate issues. 4. Anemia, unspecified typerepeat CBC today. 5. Osteoarthritis of spine with radiculopathy, lumbar regionstable. 6. Open angle glaucoma suspect  7. Severe obesity (BMI 35.0-39. 9) with comorbidity (HCC)continue to work on diet and exercise limiting alcohol. 8. Fatigue, unspecified type? Related to depression and anxiety versus a metabolic cause. We discussed a sleep study and he will consider that. We will get lab work first and then make a decision. We will try Wellbutrin and see if it helps with motivation. 9. Medicare annual wellness visit, subsequent  10. Encounter for abdominal aortic aneurysm (AAA) screening  -     US EXAM SCREENING AAA; Future  11. Prostate cancer screening  -     PSA SCREENING (SCREENING); Future  12. Mixed hyperlipidemiaLDL goal of 100. Check labs for that. 13. Fasting hyperglycemia  -     HEMOGLOBIN A1C WITH EAG; Future  14. Overactive bladdertrial of Detrol and see if it helps his symptoms.     Depression Risk Factor Screening     3 most recent PHQ Screens 8/11/2021   Little interest or pleasure in doing things Not at all   Feeling down, depressed, irritable, or hopeless Not at all   Total Score PHQ 2 0       Alcohol Risk Screen    Do you average more than 1 drink per night or more than 7 drinks a week: Yes    In the past three months have you have had more than 4 drinks containing alcohol on one occasion: No        Functional Ability and Level of Safety Hearing: Hearing is good. Activities of Daily Living: The home contains: no safety equipment. Patient does total self care      Ambulation: with mild difficulty     Fall Risk:  Fall Risk Assessment, last 12 mths 8/11/2021   Able to walk? Yes   Fall in past 12 months? 0   Do you feel unsteady? 0   Are you worried about falling 0   Number of falls in past 12 months -   Fall with injury? -      Abuse Screen:  Patient is not abused       Cognitive Screening    Has your family/caregiver stated any concerns about your memory: no         Health Maintenance Due     Health Maintenance Due   Topic Date Due    AAA Screening 73-69 YO Male Smoking Patients  Never done       Patient Care Team   Patient Care Team:  Cristian Peres MD as PCP - General  Cristian Peres MD as PCP - [address] [place] [address]  [facility] Provider  Mike Gao MD (Gastroenterology)  Clover Lujan MD (Ophthalmology)  Severa Forehand, MD (Cardiology)    History     Patient Active Problem List   Diagnosis Code    Hypogonadism male E29.1    Essential hypertension I10    UC (ulcerative colitis) ([facility] K51.90    Anemia D64.9    DJD (degenerative joint disease), lumbar M47.816    Open angle glaucoma suspect H40.009    Advance directive on file Z78.9    Severe obesity (BMI 35.0-39. 9) with comorbidity ([address] E66.01     Past Medical History:   Diagnosis Date    Allergic rhinitis     Arthritis     Back pain     Cancer ([address] 2019    Basal cell - face    Chronic pain lower back    plus many other joints    History of basal cell carcinoma (BCC) of skin 07/01/2019    left side of face    Hypertension     Hypogonadism male     PUD (peptic ulcer disease)     UC (ulcerative colitis) ([facility] 1/20/2010    Ulcerative colitis     Unspecified essential hypertension 1/20/2010      Past Surgical History:   Procedure Laterality Date    HX COLONOSCOPY  02/07/2017    3 yr f/u - Dr. Obie Aviles Left 07/01/2019    basal cell carcinoma left side of face.  HX SMALL BOWEL RESECTION      HX TONSILLECTOMY      HX TOTAL COLECTOMY  2020    partial colectomy    HX WISDOM TEETH EXTRACTION       Current Outpatient Medications   Medication Sig Dispense Refill    Eliquis 5 mg tablet TAKE ONE TABLET BY MOUTH TWICE A  Tablet 2    amLODIPine (NORVASC) 5 mg tablet TAKE 1 AND 1/2 TABLET BY MOUTH DAILY 135 Tablet 3    rosuvastatin (CRESTOR) 10 mg tablet TAKE ONE TABLET BY MOUTH ONCE NIGHTLY 90 Tablet 0    cyanocobalamin, vitamin B-12, (VITAMIN B12 PO) Take  by mouth.  cholecalciferol, VITAMIN D3, (VITAMIN D3) 5,000 unit tab tablet Take  by mouth daily.  PSYLLIUM SEED, WITH DEXTROSE, (FIBER PO) Take  by mouth.  MULTIVITAMIN PO Take  by mouth. Takes one po daily. Allergies   Allergen Reactions    No Known Allergies Other (comments)       Family History   Problem Relation Age of Onset    Cancer Mother         ovarian,colon    Dementia Sister     Diabetes Sister      Social History     Tobacco Use    Smoking status: Former Smoker     Packs/day: 1.00     Years: 30.00     Pack years: 30.00     Quit date: 1995     Years since quittin.6    Smokeless tobacco: Never Used    Tobacco comment: quit smoking cigarettes 20 yrs ago   Substance Use Topics    Alcohol use:  Yes     Alcohol/week: 25.0 standard drinks     Types: 25 Glasses of wine per week         Melani Littlejohn MD

## 2021-08-11 NOTE — PROGRESS NOTES
Chief Complaint   Patient presents with   t Annual Wellness Visit       1. Have you been to the ER, urgent care clinic since your last visit? Hospitalized since your last visit? No    2. Have you seen or consulted any other health care providers outside of the [address] [place] [address] since your last visit? Include any pap smears or colon screening.  No

## 2021-08-13 ENCOUNTER — OFFICE VISIT (OUTPATIENT)
Dept: CARDIOLOGY CLINIC | Age: 76
End: 2021-08-13
Payer: MEDICARE

## 2021-08-13 VITALS
HEIGHT: 74 IN | BODY MASS INDEX: 35.37 KG/M2 | HEART RATE: 100 BPM | OXYGEN SATURATION: 98 % | DIASTOLIC BLOOD PRESSURE: 80 MMHG | SYSTOLIC BLOOD PRESSURE: 150 MMHG | RESPIRATION RATE: 14 BRPM | WEIGHT: 275.6 LBS

## 2021-08-13 DIAGNOSIS — I48.0 PAROXYSMAL ATRIAL FIBRILLATION (HCC): ICD-10-CM

## 2021-08-13 DIAGNOSIS — I10 ESSENTIAL HYPERTENSION: Primary | ICD-10-CM

## 2021-08-13 PROCEDURE — G0463 HOSPITAL OUTPT CLINIC VISIT: HCPCS | Performed by: INTERNAL MEDICINE

## 2021-08-13 PROCEDURE — 1101F PT FALLS ASSESS-DOCD LE1/YR: CPT | Performed by: INTERNAL MEDICINE

## 2021-08-13 PROCEDURE — G8536 NO DOC ELDER MAL SCRN: HCPCS | Performed by: INTERNAL MEDICINE

## 2021-08-13 PROCEDURE — G8417 CALC BMI ABV UP PARAM F/U: HCPCS | Performed by: INTERNAL MEDICINE

## 2021-08-13 PROCEDURE — G9717 DOC PT DX DEP/BP F/U NT REQ: HCPCS | Performed by: INTERNAL MEDICINE

## 2021-08-13 PROCEDURE — G8427 DOCREV CUR MEDS BY ELIG CLIN: HCPCS | Performed by: INTERNAL MEDICINE

## 2021-08-13 PROCEDURE — G8753 SYS BP > OR = 140: HCPCS | Performed by: INTERNAL MEDICINE

## 2021-08-13 PROCEDURE — G9711 PT HX TOT COL OR COLON CA: HCPCS | Performed by: INTERNAL MEDICINE

## 2021-08-13 PROCEDURE — 99214 OFFICE O/P EST MOD 30 MIN: CPT | Performed by: INTERNAL MEDICINE

## 2021-08-13 PROCEDURE — G8754 DIAS BP LESS 90: HCPCS | Performed by: INTERNAL MEDICINE

## 2021-08-13 RX ORDER — EPLERENONE 25 MG/1
25 TABLET, FILM COATED ORAL DAILY
COMMUNITY

## 2021-08-13 NOTE — PROGRESS NOTES
[facility] [place]:   [phone]    HPI: Chad Man, a 77y.o. year-old who presents for follow up regarding AFib. Just had visit today and already added welbutrin and detrol. BP up a little bit today but he attributes it to stress, etc.     Annual physical in August, just completed. Unable to tolerate the  Valsartan- dizzy lightheaded ill. Reviewed his BP and symptom diary for me. He wants to do more walking but is just limited by the dizziness for now. A bit of swelling here and there. Does the stationary bike 15 miles for an hour most days. While watching the news. No blbocker due to fatigue and depression as side effects, tired atenolol, coreg and bystolic. Previosuly tried lisinopril and hctz also    He doesn't feel the afib and it does not appear to cause him any significant symptoms. /70-80 at home. No blood in the urine or stool, no new issues. Feels cold with the eliquis   Reviewed his labs today, nl cmp, nl cbc, LDL 72, A1C 5.3, looking very good. No palpitations or chest pain  No dyspnea with exertion, no PND or orthopnea  No bleeding or unusual bruising on eliquis  Reviewed home BP readings and his BP was slightly elevated at home  Continue to work on lowering etoh intake. Has spinal stenosis, walks with a cane  No LE edema   He has a stationary bike and rides that daily, rides it 15 miles a day  RVE slightly on echo, consider DILLON evaluation in the future   No hx of varices on EGD 2-3 years ago as far as he knows. Considering his followup plans    Assessment/Plan:  1. HTN - slightly elevated, normally good, cont eplerenone and amlodipine  2. Afib- s/p DCCV 7/27/20    -[id number]=5 so will continue Eliquis 5mg BID, reduce etoh  3. Body mass index is 35.38 kg/m². working on diet exercise and weight loss. 4. Dyslipidemia - ->72 on rosuvastatin, at goal, continue.    5. UC- s/p colon resection, followed by Dr. Misha Anthony      Carotid <50% bICA 6/21  Nuc 6/21 nl no ischemia  DCCV 7/27/20 RBBB EF 50%   Echo 6/20 - EF 55-60%, grade 1 dd, AV sclerosis, trace MR, PASP 27 mmHg  Soc Hx: remote hx tobacco use  Fhx sister with pacemaker, uncle with pacer    He  has a past medical history of Allergic rhinitis, Arthritis, Back pain, Cancer ([facility] (2019), Chronic pain (lower back), History of basal cell carcinoma (BCC) of skin (07/01/2019), Hypertension, Hypogonadism male, PUD (peptic ulcer disease), UC (ulcerative colitis) ([facility] (1/20/2010), Ulcerative colitis, and Unspecified essential hypertension (1/20/2010). Cardiovascular ROS: no chest pain or palpitations   Respiratory ROS: no cough, shortness of breath, or wheezing  Neurological ROS: no TIA or stroke symptoms  All other systems negative except as above. PE  Vitals:    08/13/21 1343   BP: (!) 150/80   Pulse: 100   Resp: 14   SpO2: 98%   Weight: 275 lb 9.6 oz (125 kg)   Height: 6' 2\" (1.88 m)    Body mass index is 35.38 kg/m².    General appearance - alert, well appearing, and in no distress   Mental status - affect appropriate to mood  Eyes - sclera anicteric, moist mucous membranes  Neck - supple, no significant adenopathy  Lymphatics - no  lymphadenopathy  Chest - clear to auscultation, no wheezes, rales or rhonchi  Heart - normal rate, regular rhythm, normal S1, S2, no murmurs, rubs, clicks or gallops  Abdomen - soft, nontender, nondistended  Back exam - full range of motion, no tenderness  Neurological - cranial nerves II through XII grossly intact, no focal deficit  Musculoskeletal - no muscular tenderness noted, normal strength  Extremities - peripheral pulses normal, no pedal edema  Skin - normal coloration  no rashes    Recent Labs:  Lab Results   Component Value Date/Time    Cholesterol, total 162 08/13/2021 08:51 AM    HDL Cholesterol 67 08/13/2021 08:51 AM    LDL, calculated 71.8 08/13/2021 08:51 AM    Triglyceride 116 08/13/2021 08:51 AM    CHOL/HDL Ratio 2.4 08/13/2021 08:51 AM     Lab Results   Component Value Date/Time    Creatinine 0.98 2021 08:51 AM     Lab Results   Component Value Date/Time    BUN 17 2021 08:51 AM     Lab Results   Component Value Date/Time    Potassium 4.4 2021 08:51 AM     Lab Results   Component Value Date/Time    Hemoglobin A1c 5.3 2021 08:51 AM     Lab Results   Component Value Date/Time    HGB 15.3 2021 08:51 AM     Lab Results   Component Value Date/Time    PLATELET 161  08:51 AM       Reviewed:  Past Medical History:   Diagnosis Date    Allergic rhinitis     Arthritis     Back pain     Cancer ([facility] 2019    Basal cell - face    Chronic pain lower back    plus many other joints    History of basal cell carcinoma (BCC) of skin 2019    left side of face    Hypertension     Hypogonadism male     PUD (peptic ulcer disease)     UC (ulcerative colitis) ([facility] 2010    Ulcerative colitis     Unspecified essential hypertension 2010     Social History     Tobacco Use   Smoking Status Former Smoker    Packs/day: 1.00    Years: 30.00    Pack years: 30.00    Quit date: 1995    Years since quittin.6   Smokeless Tobacco Never Used   Tobacco Comment    quit smoking cigarettes 20 yrs ago     Social History     Substance and Sexual Activity   Alcohol Use Yes    Alcohol/week: 25.0 standard drinks    Types: 25 Glasses of wine per week    Comment: occasional     Allergies   Allergen Reactions    No Known Allergies Other (comments)       Current Outpatient Medications   Medication Sig    eplerenone (INSPRA) 25 mg tablet Take 25 mg by mouth daily.  buPROPion XL (WELLBUTRIN XL) 150 mg tablet Take 1 Tablet by mouth every morning.  tolterodine ER (DETROL LA) 4 mg ER capsule Take 1 Capsule by mouth daily.     Eliquis 5 mg tablet TAKE ONE TABLET BY MOUTH TWICE A DAY    amLODIPine (NORVASC) 5 mg tablet TAKE 1 AND 1/2 TABLET BY MOUTH DAILY    rosuvastatin (CRESTOR) 10 mg tablet TAKE ONE TABLET BY MOUTH ONCE NIGHTLY    cyanocobalamin, vitamin B-12, (VITAMIN B12 PO) Take  by mouth.  cholecalciferol, VITAMIN D3, (VITAMIN D3) 5,000 unit tab tablet Take  by mouth daily.  PSYLLIUM SEED, WITH DEXTROSE, (FIBER PO) Take  by mouth.  MULTIVITAMIN PO Take  by mouth. Takes one po daily. No current facility-administered medications for this visit.        Roxanne Spence MD  [facility] heart and [facility] [place]  [facility], [address] [facility] [address]  [address]

## 2021-08-16 RX ORDER — ROSUVASTATIN CALCIUM 10 MG/1
TABLET, COATED ORAL
Qty: 90 TABLET | Refills: 0 | Status: SHIPPED | OUTPATIENT
Start: 2021-08-16 | End: 2021-09-07

## 2021-08-18 ENCOUNTER — HOSPITAL ENCOUNTER (OUTPATIENT)
Dept: ULTRASOUND IMAGING | Age: 76
Discharge: HOME | End: 2021-08-18
Attending: INTERNAL MEDICINE
Payer: MEDICARE

## 2021-08-18 DIAGNOSIS — Z13.6 ENCOUNTER FOR ABDOMINAL AORTIC ANEURYSM (AAA) SCREENING: ICD-10-CM

## 2021-08-18 PROCEDURE — 76706 US ABDL AORTA SCREEN AAA: CPT

## 2021-09-07 RX ORDER — ROSUVASTATIN CALCIUM 10 MG/1
TABLET, COATED ORAL
Qty: 90 TABLET | Refills: 0 | Status: SHIPPED | OUTPATIENT
Start: 2021-09-07 | End: 2022-05-19

## 2021-09-16 RX ORDER — BUPROPION HYDROCHLORIDE 150 MG/1
TABLET ORAL
Qty: 30 TABLET | Refills: 0 | Status: SHIPPED | OUTPATIENT
Start: 2021-09-16 | End: 2022-07-15

## 2022-02-08 ENCOUNTER — DOCUMENTATION ONLY (OUTPATIENT)
Dept: CARDIOLOGY | Age: 77
End: 2022-02-08

## 2022-02-08 NOTE — PROGRESS NOTES
PCP:  Jairo Edwards MD  HPI: Afsaneh Beck, a 77y.o. year-old who presents for follow up regarding AFib. He reports he has not been feeling well recently. In November he noticed that the severity of his dizziness and SOB was increasing. Today he feels very weak, fatigued, and unstable. He gets SOB just walking a short distance. He has had two syncopal episodes recently, the first one in December and the second about a week a half ago. The second one he knows was brought on by stress when his wife was in distress. Just had visit today and already added welbutrin and detrol. BP up a little bit today but he attributes it to stress, etc.     Cardiac testing unrevealing, will refer to neuro/ENT     Annual physical in August, just completed. Unable to tolerate the  Valsartan- dizzy lightheaded ill. A bit of swelling here and there. Does the stationary bike 15 miles for an hour most days. While watching the news. No blocker due to fatigue and depression as side effects, tired atenolol, coreg and bystolic. Previously tried lisinopril and hctz also     He doesn't feel the afib and it does not appear to cause him any significant symptoms. /70-80 at home. No blood in the urine or stool, no new issues. Feels cold with the eliquis       No palpitations or chest pain  No dyspnea with exertion, no PND or orthopnea  No bleeding or unusual bruising on eliquis   Continue to work on lowering etoh intake. Has spinal stenosis, walks with a cane  No LE edema    RVE slightly on echo, consider DILLNO evaluation in the future   No hx of varices on EGD 2-3 years ago as far as he knows. Considering his followup plans     Assessment/Plan:  1. HTN - slightly elevated, normally good, cont meds  2. Afib- s/p DCCV 7/27/20    -CHADS2VASC=3 so will continue Eliquis 5mg BID, reduce etoh, consider watchman  3. Body mass index is 30, working on diet exercise and weight loss.    4. Dyslipidemia - LDL 166->72 on rosuvastatin, at goal, continue. 5. UC- s/p colon resection, followed by Dr. Simeon Wyman      6. Falls, dizziness- ent, neuro referrals     Carotid <50% bICA 6/21  Nuc 6/21 nl no ischemia  DCCV 7/27/20 RBBB EF 50%   Echo 6/20 - EF 55-60%, grade 1 dd, AV sclerosis, trace MR, PASP 27 mmHg  Soc Hx: remote hx tobacco use  Fhx sister with pacemaker, uncle with pacer     He  has a past medical history of Allergic rhinitis, Arthritis, Back pain, Cancer ([facility] (2019), Chronic pain (lower back), History of basal cell carcinoma (BCC) of skin (07/01/2019), Hypertension, Hypogonadism male, PUD (peptic ulcer disease), UC (ulcerative colitis) ([facility] (1/20/2010), Ulcerative colitis, and Unspecified essential hypertension (1/20/2010). Cardiovascular ROS: no chest pain or palpitations   Respiratory ROS: no cough, shortness of breath, or wheezing  Neurological ROS: no TIA or stroke symptoms  All other systems negative except as above.

## 2022-03-18 PROBLEM — F33.1 MAJOR DEPRESSIVE DISORDER, RECURRENT, MODERATE (HCC): Status: ACTIVE | Noted: 2021-08-11

## 2022-03-19 PROBLEM — E66.01 SEVERE OBESITY (BMI 35.0-39.9) WITH COMORBIDITY (HCC): Status: ACTIVE | Noted: 2018-04-20

## 2022-03-22 RX ORDER — TOLTERODINE 4 MG/1
CAPSULE, EXTENDED RELEASE ORAL
Qty: 30 CAPSULE | Refills: 1 | Status: SHIPPED | OUTPATIENT
Start: 2022-03-22 | End: 2022-08-15 | Stop reason: ALTCHOICE

## 2022-03-29 ENCOUNTER — OFFICE VISIT (OUTPATIENT)
Dept: INTERNAL MEDICINE CLINIC | Age: 77
End: 2022-03-29
Payer: MEDICARE

## 2022-03-29 VITALS
HEIGHT: 74 IN | HEART RATE: 94 BPM | DIASTOLIC BLOOD PRESSURE: 84 MMHG | BODY MASS INDEX: 36.19 KG/M2 | OXYGEN SATURATION: 96 % | TEMPERATURE: 97.6 F | WEIGHT: 282 LBS | RESPIRATION RATE: 16 BRPM | SYSTOLIC BLOOD PRESSURE: 153 MMHG

## 2022-03-29 DIAGNOSIS — L97.529 TOE ULCER, LEFT, WITH UNSPECIFIED SEVERITY (HCC): Primary | ICD-10-CM

## 2022-03-29 DIAGNOSIS — E66.01 SEVERE OBESITY (BMI 35.0-39.9) WITH COMORBIDITY (HCC): ICD-10-CM

## 2022-03-29 PROCEDURE — G0463 HOSPITAL OUTPT CLINIC VISIT: HCPCS | Performed by: INTERNAL MEDICINE

## 2022-03-29 PROCEDURE — G8753 SYS BP > OR = 140: HCPCS | Performed by: INTERNAL MEDICINE

## 2022-03-29 PROCEDURE — G9717 DOC PT DX DEP/BP F/U NT REQ: HCPCS | Performed by: INTERNAL MEDICINE

## 2022-03-29 PROCEDURE — 1101F PT FALLS ASSESS-DOCD LE1/YR: CPT | Performed by: INTERNAL MEDICINE

## 2022-03-29 PROCEDURE — G8417 CALC BMI ABV UP PARAM F/U: HCPCS | Performed by: INTERNAL MEDICINE

## 2022-03-29 PROCEDURE — G8427 DOCREV CUR MEDS BY ELIG CLIN: HCPCS | Performed by: INTERNAL MEDICINE

## 2022-03-29 PROCEDURE — G9711 PT HX TOT COL OR COLON CA: HCPCS | Performed by: INTERNAL MEDICINE

## 2022-03-29 PROCEDURE — G8536 NO DOC ELDER MAL SCRN: HCPCS | Performed by: INTERNAL MEDICINE

## 2022-03-29 PROCEDURE — 99213 OFFICE O/P EST LOW 20 MIN: CPT | Performed by: INTERNAL MEDICINE

## 2022-03-29 PROCEDURE — G8754 DIAS BP LESS 90: HCPCS | Performed by: INTERNAL MEDICINE

## 2022-03-29 RX ORDER — CEPHALEXIN 500 MG/1
500 CAPSULE ORAL 3 TIMES DAILY
Qty: 21 CAPSULE | Refills: 0 | Status: SHIPPED | OUTPATIENT
Start: 2022-03-29 | End: 2022-08-15 | Stop reason: ALTCHOICE

## 2022-03-29 RX ORDER — TRAMADOL HYDROCHLORIDE 50 MG/1
50 TABLET ORAL
COMMUNITY

## 2022-03-29 NOTE — PATIENT INSTRUCTIONS
Please soak the toe in peroxide and warm water daily for about 15 minutes. Dress with neosporin and a bandaid daily. Podiatry appt in 2-3 weeks please.

## 2022-03-29 NOTE — PROGRESS NOTES
HPI:  Afsaneh Beck is a 77y.o. year old male who is here for a large lesion on his left great toe. Its been present for months to years. It seems to have gotten larger in size. In the past he has shaved it off with a razor blade. Now the lesion is draining. There is no significant pain. No fevers or chills. No sweats. No nausea or vomiting. No injury that he is aware of. Past Medical History:   Diagnosis Date    Allergic rhinitis     Arthritis     Back pain     Cancer ([facility] 2019    Basal cell - face    Chronic pain lower back    plus many other joints    History of basal cell carcinoma (BCC) of skin 07/01/2019    left side of face    Hypertension     Hypogonadism male     PUD (peptic ulcer disease)     UC (ulcerative colitis) ([facility] 1/20/2010    Ulcerative colitis     Unspecified essential hypertension 1/20/2010       Past Surgical History:   Procedure Laterality Date    HX COLONOSCOPY  02/07/2017    3 yr f/u - Dr. Laine Xavier Left 07/01/2019    basal cell carcinoma left side of face.  HX SMALL BOWEL RESECTION      HX TONSILLECTOMY      HX TOTAL COLECTOMY  06/24/2020    partial colectomy    HX WISDOM TEETH EXTRACTION         Prior to Admission medications    Medication Sig Start Date End Date Taking? Authorizing Provider   traMADoL (ULTRAM) 50 mg tablet Take 50 mg by mouth daily as needed for Pain. Yes Provider, Historical   cephALEXin (KEFLEX) 500 mg capsule Take 1 Capsule by mouth three (3) times daily. 3/29/22  Yes Nav Flores MD   tolterodine ER (DETROL LA) 4 mg ER capsule TAKE ONE CAPSULE BY MOUTH DAILY 3/22/22  Yes Wyline Buerger III, MD   rosuvastatin (CRESTOR) 10 mg tablet TAKE ONE TABLET BY MOUTH ONCE NIGHTLY 9/7/21  Yes Wyline Buerger III, MD   eplerenone (INSPRA) 25 mg tablet Take 25 mg by mouth daily.    Yes Provider, Historical   Eliquis 5 mg tablet TAKE ONE TABLET BY MOUTH TWICE A DAY 6/21/21  Yes Mark Land MD   amLODIPine (NORVASC) 5 mg tablet TAKE 1 AND 1/2 TABLET BY MOUTH DAILY 21  Yes Nikos Castelan, NP   PSYLLIUM SEED, WITH DEXTROSE, (FIBER PO) Take  by mouth. Yes Provider, Historical   MULTIVITAMIN PO Take  by mouth. Takes one po daily. Yes Provider, Historical   buPROPion XL (WELLBUTRIN XL) 150 mg tablet TAKE ONE TABLET BY MOUTH EVERY MORNING  Patient not taking: Reported on 3/29/2022 9/16/21   Wilver Dickey MD   cyanocobalamin, vitamin B-12, (VITAMIN B12 PO) Take  by mouth. Patient not taking: Reported on 3/29/2022    Provider, Historical   cholecalciferol, VITAMIN D3, (VITAMIN D3) 5,000 unit tab tablet Take  by mouth daily. Patient not taking: Reported on 3/29/2022    Provider, Historical       Social History     Socioeconomic History    Marital status:      Spouse name: Not on file    Number of children: Not on file    Years of education: Not on file    Highest education level: Not on file   Occupational History    Not on file   Tobacco Use    Smoking status: Former Smoker     Packs/day: 1.00     Years: 30.00     Pack years: 30.00     Quit date: 1995     Years since quittin.2    Smokeless tobacco: Never Used    Tobacco comment: quit smoking cigarettes 20 yrs ago   Vaping Use    Vaping Use: Never used   Substance and Sexual Activity    Alcohol use: Yes     Alcohol/week: 25.0 standard drinks     Types: 25 Glasses of wine per week     Comment: occasional    Drug use: No    Sexual activity: Never   Other Topics Concern    Not on file   Social History Narrative    Not on file     Social Determinants of Health     Financial Resource Strain:     Difficulty of Paying Living Expenses: Not on file   Food Insecurity:     Worried About Running Out of Food in the Last Year: Not on file    Enedelia of Food in the Last Year: Not on file   Transportation Needs:     Lack of Transportation (Medical): Not on file    Lack of Transportation (Non-Medical):  Not on file   Physical Activity:     Days of Exercise per Week: Not on file    Minutes of Exercise per Session: Not on file   Stress:     Feeling of Stress : Not on file   Social Connections:     Frequency of Communication with Friends and Family: Not on file    Frequency of Social Gatherings with Friends and Family: Not on file    Attends Church Services: Not on file    Active Member of [address] [place] [address] [employer] or Organizations: Not on file    Attends Club or Organization Meetings: Not on file    Marital Status: Not on file   Intimate Partner Violence:     Fear of Current or Ex-Partner: Not on file    Emotionally Abused: Not on file    Physically Abused: Not on file    Sexually Abused: Not on file   Housing Stability:     Unable to Pay for Housing in the Last Year: Not on file    Number of [place] in the Last Year: Not on file    Unstable Housing in the Last Year: Not on file          ROS  Per HPI    Visit Vitals  BP (!) 153/84   Pulse 94   Temp 97.6 °F (36.4 °C) (Temporal)   Resp 16   Ht 6' 2\" (1.88 m)   Wt 282 lb (127.9 kg)   SpO2 96%   BMI 36.21 kg/m²         Physical Exam   Physical Examination: General appearance - alert, well appearing, and in no distress  Large verrucous lesion on the medial aspect of the left great toe with a small ulcer on the plantar aspect - no fluctulence. Area was prepped with alcohol and ethyl chloride, area was carved down with no bleeding. Assessment/Plan:  Diagnoses and all orders for this visit:    1. Toe ulcer, left, with unspecified severity ([address] - ? Infection. Will treat with antibiotics and wound care. See podiatry after treatment.   -     REFERRAL TO PODIATRY    2. Severe obesity (BMI 35.0-39. 9) with comorbidity ([address]    Other orders  -     cephALEXin (KEFLEX) 500 mg capsule; Take 1 Capsule by mouth three (3) times daily.               Advised him to call back or return to office if symptoms worsen/change/persist.  Discussed expected course/resolution/complications of diagnosis in detail with patient. Medication risks/benefits/costs/interactions/alternatives discussed with patient. He was given an after visit summary which includes diagnoses, current medications, & vitals. He expressed understanding with the diagnosis and plan.

## 2022-04-11 RX ORDER — TOLTERODINE 4 MG/1
4 CAPSULE, EXTENDED RELEASE ORAL DAILY
Qty: 30 CAPSULE | Refills: 1 | OUTPATIENT
Start: 2022-04-11

## 2022-04-11 NOTE — TELEPHONE ENCOUNTER
Requested Prescriptions     Pending Prescriptions Disp Refills    tolterodine ER (DETROL LA) 4 mg ER capsule 30 Capsule 1     Sig: Take 1 Capsule by mouth daily. [address] [id number] - [address] [facility].   [phone]

## 2022-04-11 NOTE — TELEPHONE ENCOUNTER
Chief Complaint   Patient presents with    Medication Refill     Last Appointment with Dr. Vito Davenport:  3/29/2022  Future Appointments   Date Time Provider Loco Castro   5/17/2022  2:00 PM Africa Lopez DO NEUSM BS AMB   8/15/2022  9:30 AM MD RUBIN Michelle BS AMB

## 2022-05-17 ENCOUNTER — OFFICE VISIT (OUTPATIENT)
Dept: NEUROLOGY | Age: 77
End: 2022-05-17
Payer: MEDICARE

## 2022-05-17 VITALS
OXYGEN SATURATION: 96 % | WEIGHT: 282 LBS | HEART RATE: 101 BPM | BODY MASS INDEX: 36.21 KG/M2 | DIASTOLIC BLOOD PRESSURE: 80 MMHG | RESPIRATION RATE: 18 BRPM | SYSTOLIC BLOOD PRESSURE: 142 MMHG

## 2022-05-17 DIAGNOSIS — R20.0 NUMBNESS AND TINGLING OF BOTH FEET: ICD-10-CM

## 2022-05-17 DIAGNOSIS — G45.0 VBI (VERTEBROBASILAR INSUFFICIENCY): Primary | ICD-10-CM

## 2022-05-17 DIAGNOSIS — R42 MULTISENSORY DIZZINESS: ICD-10-CM

## 2022-05-17 DIAGNOSIS — G60.9 PERIPHERAL NEUROPATHY, HEREDITARY/IDIOPATHIC: ICD-10-CM

## 2022-05-17 DIAGNOSIS — E55.9 VITAMIN D DEFICIENCY: ICD-10-CM

## 2022-05-17 DIAGNOSIS — M48.061 SPINAL STENOSIS OF LUMBAR REGION, UNSPECIFIED WHETHER NEUROGENIC CLAUDICATION PRESENT: ICD-10-CM

## 2022-05-17 DIAGNOSIS — R20.2 NUMBNESS AND TINGLING OF BOTH FEET: ICD-10-CM

## 2022-05-17 DIAGNOSIS — G25.0 ESSENTIAL TREMOR: ICD-10-CM

## 2022-05-17 PROCEDURE — G8417 CALC BMI ABV UP PARAM F/U: HCPCS | Performed by: PSYCHIATRY & NEUROLOGY

## 2022-05-17 PROCEDURE — G8753 SYS BP > OR = 140: HCPCS | Performed by: PSYCHIATRY & NEUROLOGY

## 2022-05-17 PROCEDURE — G9711 PT HX TOT COL OR COLON CA: HCPCS | Performed by: PSYCHIATRY & NEUROLOGY

## 2022-05-17 PROCEDURE — 1101F PT FALLS ASSESS-DOCD LE1/YR: CPT | Performed by: PSYCHIATRY & NEUROLOGY

## 2022-05-17 PROCEDURE — 99204 OFFICE O/P NEW MOD 45 MIN: CPT | Performed by: PSYCHIATRY & NEUROLOGY

## 2022-05-17 PROCEDURE — G8427 DOCREV CUR MEDS BY ELIG CLIN: HCPCS | Performed by: PSYCHIATRY & NEUROLOGY

## 2022-05-17 PROCEDURE — G8536 NO DOC ELDER MAL SCRN: HCPCS | Performed by: PSYCHIATRY & NEUROLOGY

## 2022-05-17 PROCEDURE — G8754 DIAS BP LESS 90: HCPCS | Performed by: PSYCHIATRY & NEUROLOGY

## 2022-05-17 PROCEDURE — G9717 DOC PT DX DEP/BP F/U NT REQ: HCPCS | Performed by: PSYCHIATRY & NEUROLOGY

## 2022-05-17 NOTE — PROGRESS NOTES
Chief Complaint   Patient presents with    New Patient     Complains of dizziness with chnaging position xmonths. Referred by: DR Ulysess Hammans      [facility]    Mr. Eduardo Mckeon is a 28-year-old gentleman with cardiovascular disease, A. fib on Eliquis and other stroke risk factors who was referred to me from cardiology for dizziness. I spoke with he and his wife today personally. He tells me he has had dizziness off and on for several years. He says the dizziness is worse when he is standing upright particular brushing his teeth or shaving. He feels better lying down. No vision changes or unusual unilateral numbness or weakness. He has had a few spells of sudden loss of consciousness the most recent in January 2022. He also feels like his imbalance is getting worse within the last 12 months. He needs to use a Rollator and cane. He does have spinal stenosis making it difficult to ambulate sometimes. He uses a stationary bike daily. Further questioning reveals a history of suspected ulcerative colitis on sulfasalazine for many years. Ultimately had a colectomy. However it sounds like recent GI consultation suggest he never had ulcerative colitis. He stopped sulfasalazine a couple years ago. He has numbness and tingling in both of his feet and fingers on both sides. He still drinks nightly a large glass of wine every night. Stop smoking in 1985. He is retired crime .        Review of Systems   Constitutional: Positive for malaise/fatigue. Musculoskeletal: Positive for back pain and falls. Neurological: Positive for dizziness and sensory change. All other systems reviewed and are negative.       Past Medical History:   Diagnosis Date    Allergic rhinitis     Arthritis     Back pain     Cancer ([facility] [address] 2019    Basal cell - face    Chronic pain lower back    plus many other joints    History of basal cell carcinoma (BCC) of skin 07/01/2019    left side of face    Hypertension     Hypogonadism male     PUD (peptic ulcer disease)     UC (ulcerative colitis) ([facility] [address] 2010    Ulcerative colitis     Unspecified essential hypertension 2010     Family History   Problem Relation Age of Onset    Cancer Mother         ovarian,colon    Dementia Sister     Diabetes Sister      Social History     Socioeconomic History    Marital status:      Spouse name: Not on file    Number of children: Not on file    Years of education: Not on file    Highest education level: Not on file   Occupational History    Not on file   Tobacco Use    Smoking status: Former Smoker     Packs/day: 1.00     Years: 30.00     Pack years: 30.00     Quit date: 1995     Years since quittin.3    Smokeless tobacco: Never Used    Tobacco comment: quit smoking cigarettes 20 yrs ago   Vaping Use    Vaping Use: Never used   Substance and Sexual Activity    Alcohol use: Yes     Alcohol/week: 25.0 standard drinks     Types: 25 Glasses of wine per week     Comment: occasional    Drug use: No    Sexual activity: Never   Other Topics Concern    Not on file   Social History Narrative    Not on file     Social Determinants of Health     Financial Resource Strain:     Difficulty of Paying Living Expenses: Not on file   Food Insecurity:     Worried About Running Out of Food in the Last Year: Not on file    Enedelia of Food in the Last Year: Not on file   Transportation Needs:     Lack of Transportation (Medical): Not on file    Lack of Transportation (Non-Medical):  Not on file   Physical Activity:     Days of Exercise per Week: Not on file    Minutes of Exercise per Session: Not on file   Stress:     Feeling of Stress : Not on file   Social Connections:     Frequency of Communication with Friends and Family: Not on file    Frequency of Social Gatherings with Friends and Family: Not on file    Attends Islam Services: Not on file    Active Member of Clubs or Organizations: Not on file   Jess Chua Attends Club or Organization Meetings: Not on file    Marital Status: Not on file   Intimate Partner Violence:     Fear of Current or Ex-Partner: Not on file    Emotionally Abused: Not on file    Physically Abused: Not on file    Sexually Abused: Not on file   Housing Stability:     Unable to Pay for Housing in the Last Year: Not on file    Number of Armond in the Last Year: Not on file    Unstable Housing in the Last Year: Not on file     Current Outpatient Medications   Medication Sig    traMADoL (ULTRAM) 50 mg tablet Take 50 mg by mouth daily as needed for Pain.  rosuvastatin (CRESTOR) 10 mg tablet TAKE ONE TABLET BY MOUTH ONCE NIGHTLY    eplerenone (INSPRA) 25 mg tablet Take 25 mg by mouth daily.  Eliquis 5 mg tablet TAKE ONE TABLET BY MOUTH TWICE A DAY    amLODIPine (NORVASC) 5 mg tablet TAKE 1 AND 1/2 TABLET BY MOUTH DAILY    PSYLLIUM SEED, WITH DEXTROSE, (FIBER PO) Take  by mouth.  MULTIVITAMIN PO Take  by mouth. Takes one po daily.  cephALEXin (KEFLEX) 500 mg capsule Take 1 Capsule by mouth three (3) times daily.  tolterodine ER (DETROL LA) 4 mg ER capsule TAKE ONE CAPSULE BY MOUTH DAILY    buPROPion XL (WELLBUTRIN XL) 150 mg tablet TAKE ONE TABLET BY MOUTH EVERY MORNING (Patient not taking: Reported on 3/29/2022)    cyanocobalamin, vitamin B-12, (VITAMIN B12 PO) Take  by mouth. (Patient not taking: Reported on 3/29/2022)    cholecalciferol, VITAMIN D3, (VITAMIN D3) 5,000 unit tab tablet Take  by mouth daily. (Patient not taking: Reported on 3/29/2022)     No current facility-administered medications for this visit. Allergies   Allergen Reactions    Lanolin Itching    No Known Allergies Other (comments)         Neurologic Exam     Mental Status   Oriented to person, place, and time. Cranial Nerves   Cranial nerves II through XII intact. Motor Exam   Muscle bulk: normal    Strength   Strength 5/5 throughout.      Sensory Exam   Light touch normal.     Gait, Coordination, and Reflexes     Gait  Gait: (Slow slightly wide not shuffling independent)    Coordination   Finger to nose coordination: normal    Tremor   Intention tremor: presentEssential tremor noted in the head and hands. Kinetic tremor noted. No resting tremor. No bradykinesia. Physical Exam  Vitals and nursing note reviewed. Constitutional:       Appearance: Normal appearance. Pulmonary:      Effort: Pulmonary effort is normal.   Skin:     General: Skin is warm and dry. Neurological:      Mental Status: He is alert and oriented to person, place, and time. Coordination: Finger-Nose-Finger Test normal.      Deep Tendon Reflexes: Strength normal.       Visit Vitals  BP (!) 142/80   Pulse (!) 101   Resp 18   Wt 282 lb (127.9 kg)   SpO2 96%   BMI 36.21 kg/m²       Lab Results   Component Value Date/Time    WBC 7.1 08/13/2021 08:51 AM    HGB 15.3 08/13/2021 08:51 AM    HCT 43.6 08/13/2021 08:51 AM    PLATELET 834 09/05/7292 08:51 AM    MCV 95.4 08/13/2021 08:51 AM     Lab Results   Component Value Date/Time    Hemoglobin A1c 5.3 08/13/2021 08:51 AM    Hemoglobin A1c 5.4 08/27/2020 09:19 AM    Hemoglobin A1c 5.2 11/22/2017 09:45 AM    Glucose 131 (H) 08/13/2021 08:51 AM    LDL, calculated 71.8 08/13/2021 08:51 AM    Creatinine 0.98 08/13/2021 08:51 AM      Lab Results   Component Value Date/Time    Cholesterol, total 162 08/13/2021 08:51 AM    HDL Cholesterol 67 08/13/2021 08:51 AM    LDL, calculated 71.8 08/13/2021 08:51 AM    Triglyceride 116 08/13/2021 08:51 AM    CHOL/HDL Ratio 2.4 08/13/2021 08:51 AM     Lab Results   Component Value Date/Time    ALT (SGPT) 45 08/13/2021 08:51 AM    Alk. phosphatase 80 08/13/2021 08:51 AM    Bilirubin, total 0.6 08/13/2021 08:51 AM    Albumin 4.0 08/13/2021 08:51 AM    Protein, total 7.4 08/13/2021 08:51 AM    PLATELET 517 11/03/2697 08:51 AM        CT Results (maximum last 3): No results found for this or any previous visit.       MRI Results (maximum last 3):  Results from Hospital Encounter encounter on 11/15/17    MRI LUMB SPINE WO CONT    Narrative  EXAM:  MRI LUMB SPINE WO CONT    INDICATION:  Abnormal xray, spine, DJD; spinal stenosis, pain, recent fall. Spinal stenosis, lumbar region without neurogenic claudication    COMPARISON: Radiographs 11/10/2017. MRI 5/30/2014    TECHNIQUE: MR imaging of the lumbar spine was performed using the following  sequences: sagittal T1, T2, STIR;  axial T1, T2.    CONTRAST:  None. FINDINGS:    There is transitional anatomy at the lumbosacral junction with partial  sacralization of L5 and a rudimentary disc at L5-S1. There is normal alignment  of the lumbar spine. There is no significant loss of height. No evidence of  fracture. There is a 10 mm lesion in L3 with decreased T1 and T2 signal likely  related to sclerosis. There is no increased or stable. This is unchanged since  2014 which likes remains at this is incidental. This could possibly represent a  bone island. Areas of fatty deposition are seen. No significant edema. A small  Schmorl's node is seen along the inferior endplate of L1. The conus medullaris terminates at . Signal and caliber of the distal spinal  cord are within normal limits. The paraspinal soft tissues are within normal limits. Lower thoracic spine: No herniation or stenosis. L1-L2:  Mild disc space narrowing. Minimal broad-based disc bulge without  significant spinal stenosis or neural foraminal narrowing. L2-L3:  Disc desiccation. No herniation or stenosis. L3-L4:  Disc desiccation. Mild broad-based disc bulge with a small area of  annular tearing in the central and left paracentral regions. There is mild  spinal stenosis with mild right neural foraminal narrowing. L4-L5:  Severe disc space narrowing. Minimal central disc osteophyte complex  without significant spinal stenosis or neural foraminal narrowing. L5-S1:  Rudimentary disc.  No herniation or stenosis. Impression  IMPRESSION:    1. Incidental transitional anatomy at the lumbosacral junction. 2. Unchanged small sclerotic lesion in L3. Given its stability since 2014, this  is likely incidental and may represent a small bone island. 3. Unchanged mild spondylosis as above. Unchanged mild spinal stenosis with mild  right neural foraminal narrowing at L3-4      Results from Hospital Encounter encounter on 05/30/14    MRI LUMB SPINE WO CONT    Narrative  **Final Report**      ICD Codes / Adm. Diagnosis: 724.5  722.52 / Backache, unspecified  Degeneration of lumbar or lumb  Examination:  MR L SPINE WO CON  - [id number] - May 30 2014  4:40PM  Accession No:  [id number]  Reason:  Back pain and DJD on plain xrays      REPORT:  INDICATION: Back pain. PROCEDURE: Sagittal and axial and coronal images were obtained through the  spine in various sequences. FINDINGS:  Comparison exam:  None are available. Sagittal images demonstrate normal spinal alignment. There is a transitional  lumbosacral segment. The lower most segment with a diminutive disc caudal to  this segment will be labeled L5 for the purpose of this exam. Severe disc  space narrowing at L4-L5. Spinal canal relatively capacious. Conus has a  normal appearance. Parasagittal images demonstrate minimal to mild foraminal narrowing on the  right at L3-L4. STIR sequences demonstrate no significant bone marrow edema and no  compression deformity. The conus medullaris is in a normal position and has a normal appearance. No bony destructive process is identified. Axial images through T12-L1 demonstrate no HNP or spinal stenosis. Axial images through L1-L2 demonstrate mild disc bulge with a superimposed  mild broad-based left lateral protrusion. Axial images through L2-L3 demonstrate no HNP or spinal stenosis. Minimal to  mild facet arthropathy.     Axial images through L3-L4 demonstrate mild disc bulge which in combination  with moderate posterior facet and ligamentous hypertrophic degenerative  change produces minimal central canal narrowing. Axial images through L4-L5 demonstrate a focal central broad-based  protrusion/spondylitic change producing minimal to mild ventral epidural  impression upon the thecal sac. Mild facet arthropathy. Axial images were non-obtained through L5-S1. Sagittal images demonstrate no  significant pathology. Impression  :    Transitional lumbosacral segment. Mild to moderate multilevel degenerative disc and degenerative joint disease  without significant central spinal stenosis or foraminal stenosis. Sclerotic density in L3 vertebra of uncertain significance clinically. May  represent bone island or degenerative change. However, if there is any  suspicion for metastatic process, nuclear medicine bone imaging study maybe  helpful for further evaluation if indicated clinically. Signing/Reading Doctor: Deepak Lynn ([id number])  Jass Lynn ([id number])  May 30 2014  5:17PM      PET Results (maximum last 3): No results found for this or any previous visit. Assessment and Plan   Diagnoses and all orders for this visit:    1. VBI (vertebrobasilar insufficiency)  -     CTA HEAD; Future    2. Peripheral neuropathy, hereditary/idiopathic  -     VITAMIN B12; Future  -     REFERRAL TO HOME HEALTH  -     EMG NCV MOTOR WITH F/WAVE PER NERVE; Future    3. Spinal stenosis of lumbar region, unspecified whether neurogenic claudication present  -     REFERRAL TO HOME HEALTH    4. Multisensory dizziness  -     VITAMIN D, 25 HYDROXY; Future  -     CTA HEAD; Future    5. Numbness and tingling of both feet  -     VITAMIN B12; Future  -     VITAMIN D, 25 HYDROXY; Future  -     REFERRAL TO HOME HEALTH  -     EMG NCV MOTOR WITH F/WAVE PER NERVE; Future    6. Vitamin D deficiency  -     VITAMIN D, 25 HYDROXY; Future    7.  Essential tremor      78-year-old gentleman with cardiovascular disease on anticoagulation as well as other stroke risk factors who is having 2 main issues I am addressing today with further testing. First primary concern of dizziness is a very longstanding issue. It almost has the flavor of vertigo. His symptoms are worse when he is upright which makes me wonder about some type of cerebral flow problem particularly given the cardiovascular history. Need to check a CTA to make sure he not have any evidence of vertebrobasilar insufficiency. He is on Eliquis already. I might consider adding low-dose aspirin if there is a lot of intracranial atherosclerosis. Any strokelike symptoms please come to the ER immediately. Second primary concern is the imbalance he is reporting sound suggestive of peripheral neuropathy developing in the lower extremities. I think he has many reasons to develop this namely to include alcohol consumption long-term, history of colectomy making him prone to nutritional deficiencies, med effect (sulfasalazine?). Check EMG for evidence of neuropathy and check some blood work given history of colectomy. He has known vitamin D deficiency so we need to check that as well since he stopped taking supplements. He has essential tremor clearly in the hands and head which is longstanding and not Parkinson's. A course of home health specifically PT OT to help improve his strength and reduce his falls risk particularly given the history of lumbar spinal stenosis. I reviewed and decided to continue the current medications. This clinical note was dictated with an electronic dictation software that can make unintentional errors. If there are any questions, please contact me directly for clarification. A notice of this visit/encounter being completed has been sent electronically to the patient's PCP and/or referring provider.      2 [facility], [facility] [address]  Diplomate RICKIE

## 2022-05-18 LAB
25(OH)D3+25(OH)D2 SERPL-MCNC: 33.8 NG/ML (ref 30–100)
VIT B12 SERPL-MCNC: 506 PG/ML (ref 232–1245)

## 2022-05-18 NOTE — PROGRESS NOTES
Vitamin D is trending low. He should be taking at least 4000 IU vitamin D3 over-the-counter daily. Vitamin B12 is okay.

## 2022-05-19 ENCOUNTER — HOSPITAL ENCOUNTER (OUTPATIENT)
Dept: CT IMAGING | Age: 77
Discharge: HOME | End: 2022-05-19
Attending: PSYCHIATRY & NEUROLOGY
Payer: MEDICARE

## 2022-05-19 DIAGNOSIS — G45.0 VBI (VERTEBROBASILAR INSUFFICIENCY): ICD-10-CM

## 2022-05-19 DIAGNOSIS — R42 MULTISENSORY DIZZINESS: ICD-10-CM

## 2022-05-19 PROCEDURE — 70496 CT ANGIOGRAPHY HEAD: CPT

## 2022-05-19 PROCEDURE — 74011000636 HC RX REV CODE- 636: Performed by: PSYCHIATRY & NEUROLOGY

## 2022-05-19 RX ORDER — ROSUVASTATIN CALCIUM 10 MG/1
TABLET, COATED ORAL
Qty: 90 TABLET | Refills: 0 | Status: SHIPPED | OUTPATIENT
Start: 2022-05-19 | End: 2022-08-18

## 2022-05-19 RX ADMIN — IOPAMIDOL 100 ML: 755 INJECTION, SOLUTION INTRAVENOUS at 14:00

## 2022-05-20 ENCOUNTER — DOCUMENTATION ONLY (OUTPATIENT)
Dept: NEUROLOGY | Age: 77
End: 2022-05-20

## 2022-05-20 DIAGNOSIS — I67.1 CEREBRAL ANEURYSM, NONRUPTURED: Primary | ICD-10-CM

## 2022-05-20 NOTE — PROGRESS NOTES
I reviewed the blood vessel scan of the brain. 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 I think this is incidental and has nothing to do with his dizziness. I wanted to make sure the arteries in the back of the brain had no flow problem and everything seems fine. I will refer him to neuro interventional about this tiny aneurysm for them to take a look at but I am not highly concerned over this aneurysm at this time. Likely will be followed with surveillance scans over time.

## 2022-06-03 ENCOUNTER — OFFICE VISIT (OUTPATIENT)
Dept: NEUROLOGY | Age: 77
End: 2022-06-03
Payer: MEDICARE

## 2022-06-03 DIAGNOSIS — G62.9 POLYNEUROPATHY: Primary | ICD-10-CM

## 2022-06-03 PROCEDURE — 95886 MUSC TEST DONE W/N TEST COMP: CPT | Performed by: PSYCHIATRY & NEUROLOGY

## 2022-06-03 PROCEDURE — 95910 NRV CNDJ TEST 7-8 STUDIES: CPT | Performed by: PSYCHIATRY & NEUROLOGY

## 2022-06-03 NOTE — PROGRESS NOTES
[address] [facility]  [address]  Phone [phone] Fax [phone]  Test Date:  6/3/2022    Patient: Vick Pike : 1945 Physician: Idalia Brown. Cristine Larsen DO   Sex: Male Height: 6' 2\" Ref Phys: Cynthia Evans   ID#: [id number]  Weight: 282 lbs. Technician: Alise Keohler     Patient Complaints:  Lower extremity paresthesias; Imbalance    NCV & EMG Findings:  Evaluation of the left peroneal motor and the right peroneal motor nerves showed no response (Ankle), no response (B Fib), and no response (Poplt). The left tibial motor and the right tibial motor nerves showed prolonged distal onset latency (L7.6, R8.4 ms) and reduced amplitude (L0.8, R2.5 mV). The left Sup Peroneal sensory and the right Sup Peroneal sensory nerves showed no response (14 cm). The left sural sensory and the right sural sensory nerves showed no response (Calf). Left vs. Right side comparison data for the tibial motor nerve indicates abnormal L-R amplitude difference (68.0 %). F Wave studies indicate that the right tibial F wave has prolonged latency (69.68 ms). Needle evaluation of the right extensor digitorum brevis muscle showed very decreased interference pattern. The right abductor hallucis muscle showed increased motor unit amplitude, increased motor unit duration, rapid recruitment, and very decreased interference pattern. The right anterior tibialis muscle showed increased motor unit amplitude and increased motor unit duration. All remaining muscles (as indicated in the following table) showed no evidence of electrical instability. Impression:  Extensive electrodiagnostic examination of the right lower extremity and additional nerve conduction studies of the left lower extremity reveals the followin.  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 2. No evidence of a right lumbosacral motor radiculopathy. ___________________________  Nissa Morfin, DO        Nerve Conduction Studies  Anti Sensory Summary Table     Stim Site NR Peak (ms) Norm Peak (ms) P-T Amp (µV) Norm P-T Amp Onset (ms) Site1 Site2 Delta-P (ms) Dist (cm) Pancho (m/s) Norm Pancho (m/s)   Left Sup Peroneal Anti Sensory (Ant Lat Mall)  30.2°C   14 cm NR  <4.4  >5.0  14 cm Ant Lat Mall  14.0  >32   Right Sup Peroneal Anti Sensory (Ant Lat Mall)  31.1°C   14 cm NR  <4.4  >5.0  14 cm Ant Lat Mall  14.0  >32   Left Sural Anti Sensory (Lat Mall)  30.3°C   Calf NR  <4.0  >5.0  Calf Lat Mall  14.0  >35   Right Sural Anti Sensory (Lat Mall)  31.3°C   Calf NR  <4.0  >5.0  Calf Lat Mall  14.0  >35     Motor Summary Table     Stim Site NR Onset (ms) Norm Onset (ms) O-P Amp (mV) Norm O-P Amp Site1 Site2 Delta-0 (ms) Dist (cm) Pancho (m/s) Norm Pancho (m/s)   Left Peroneal Motor (Ext Dig Brev)  30.6°C   Ankle NR  <6.1  >2.5 B Fib Ankle  41.0  >38   B Fib NR     Poplt B Fib  0.0  >40   Poplt NR             Right Peroneal Motor (Ext Dig Brev)  31.6°C   Ankle NR  <6.1  >2.5 B Fib Ankle  41.0  >38   B Fib NR     Poplt B Fib  10.0  >40   Poplt NR             Left Tibial Motor (Abd Baker Brev)  30.9°C   Ankle    7.6 <6.1 0.8 >3.0 Knee Ankle 12.6 49.0 39 >35   Knee    20.2  0.5          Right Tibial Motor (Abd Baker Brev)  32°C   Ankle    8.4 <6.1 2.5 >3.0 Knee Ankle 10.7 49.0 46 >35   Knee    19.1  1.4            F Wave Studies     NR F-Lat (ms) Lat Norm (ms) L-R F-Lat (ms) L-R Lat Norm   Right Tibial (Mrkrs) (Abd Hallucis)  32.2°C      69.68 <61  <5.7     EMG     Side Muscle Nerve Root Ins Act Fibs Psw Amp Dur Poly Recrt Int Auther Cheese Comment   Right Ext Dig Brev Dp Br Peronel L5, S1 Nml Nml Nml Nml Nml 0 Nml 25% nmu   Right AbdHallucis MedPlantar S1-2 Nml Nml Nml Incr >12ms 0 Rapid 25%    Right Gastroc Tibial S1-2 Nml Nml Nml Nml Nml 0 Nml Nml    Right AntTibialis Dp Br Peronel L4-5 Nml Nml Nml Incr >12ms 0 Nml Nml    Right RectFemoris Femoral L2-4 Nml Nml Nml Nml Nml 0 Nml Nml          Waveforms:

## 2022-07-15 ENCOUNTER — OFFICE VISIT (OUTPATIENT)
Dept: NEUROLOGY | Age: 77
End: 2022-07-15
Payer: MEDICARE

## 2022-07-15 VITALS
HEIGHT: 74 IN | SYSTOLIC BLOOD PRESSURE: 162 MMHG | WEIGHT: 282 LBS | DIASTOLIC BLOOD PRESSURE: 94 MMHG | BODY MASS INDEX: 36.19 KG/M2 | HEART RATE: 80 BPM | OXYGEN SATURATION: 98 %

## 2022-07-15 DIAGNOSIS — G47.19 EXCESSIVE DAYTIME SLEEPINESS: ICD-10-CM

## 2022-07-15 DIAGNOSIS — G62.89 AXONAL SENSORIMOTOR NEUROPATHY: ICD-10-CM

## 2022-07-15 DIAGNOSIS — H81.10 BENIGN PAROXYSMAL POSITIONAL VERTIGO, UNSPECIFIED LATERALITY: ICD-10-CM

## 2022-07-15 DIAGNOSIS — I67.1 MIDDLE CEREBRAL ARTERY ANEURYSM: Primary | ICD-10-CM

## 2022-07-15 PROCEDURE — G8755 DIAS BP > OR = 90: HCPCS | Performed by: PSYCHIATRY & NEUROLOGY

## 2022-07-15 PROCEDURE — G8753 SYS BP > OR = 140: HCPCS | Performed by: PSYCHIATRY & NEUROLOGY

## 2022-07-15 PROCEDURE — 99215 OFFICE O/P EST HI 40 MIN: CPT | Performed by: PSYCHIATRY & NEUROLOGY

## 2022-07-15 PROCEDURE — 1123F ACP DISCUSS/DSCN MKR DOCD: CPT | Performed by: PSYCHIATRY & NEUROLOGY

## 2022-07-15 PROCEDURE — G8536 NO DOC ELDER MAL SCRN: HCPCS | Performed by: PSYCHIATRY & NEUROLOGY

## 2022-07-15 PROCEDURE — G9717 DOC PT DX DEP/BP F/U NT REQ: HCPCS | Performed by: PSYCHIATRY & NEUROLOGY

## 2022-07-15 PROCEDURE — 1101F PT FALLS ASSESS-DOCD LE1/YR: CPT | Performed by: PSYCHIATRY & NEUROLOGY

## 2022-07-15 PROCEDURE — G8427 DOCREV CUR MEDS BY ELIG CLIN: HCPCS | Performed by: PSYCHIATRY & NEUROLOGY

## 2022-07-15 PROCEDURE — G8417 CALC BMI ABV UP PARAM F/U: HCPCS | Performed by: PSYCHIATRY & NEUROLOGY

## 2022-07-15 PROCEDURE — G9711 PT HX TOT COL OR COLON CA: HCPCS | Performed by: PSYCHIATRY & NEUROLOGY

## 2022-07-15 RX ORDER — MIRTAZAPINE 15 MG/1
15 TABLET, FILM COATED ORAL
Qty: 90 TABLET | Refills: 1 | Status: SHIPPED
Start: 2022-07-15 | End: 2022-08-15 | Stop reason: DRUGHIGH

## 2022-07-15 RX ORDER — MIRABEGRON 25 MG/1
TABLET, FILM COATED, EXTENDED RELEASE ORAL
COMMUNITY
Start: 2022-06-29 | End: 2022-08-15

## 2022-07-15 RX ORDER — VIBEGRON 75 MG/1
TABLET, FILM COATED ORAL
COMMUNITY
Start: 2022-07-05 | End: 2022-08-15 | Stop reason: ALTCHOICE

## 2022-07-15 RX ORDER — METOPROLOL SUCCINATE 25 MG/1
TABLET, EXTENDED RELEASE ORAL
COMMUNITY
Start: 2022-06-17 | End: 2022-08-15

## 2022-07-15 NOTE — PROGRESS NOTES
Chief Complaint   Patient presents with    Follow-up     after EMG, VBI, peripheral neuropathy     Visit Vitals  BP (!) 162/94 (BP 1 Location: Right upper arm, BP Patient Position: Sitting)   Pulse 80   Ht 6' 2\" (1.88 m)   Wt 282 lb (127.9 kg)   SpO2 98%   BMI 36.21 kg/m²

## 2022-07-15 NOTE — PROGRESS NOTES
Chief Complaint   Patient presents with    Follow-up     after EMG, VBI, peripheral neuropathy       HPI    Mr. Kaylee Magallanes is a 61-year-old gentleman here to follow-up. He hasHorizant history of stroke cardiovascular wise and is on Eliquis. I saw him initially for dizziness and poor balance. We completed an EMG showing axonal peripheral neuropathy sensorimotor. We completed a CTA looking for any VBI which there is none but he did have an incidental right MCA aneurysm. He is currently in PT and OT at home. He still has a large glass of wine nightly. No falls. Uses a walker. He is here today with his wife. He still gets dizziness with position change predominantly. Balance is very poor. Wife is reporting a lot of anxiety increasing recently. He has very poor quality sleep waking up tired and fatigued already. Background:  Mr. Kaylee Magallanes is a 61-year-old gentleman with cardiovascular disease, A. fib on Eliquis and other stroke risk factors who was referred to me from cardiology for dizziness. I spoke with he and his wife today personally. He tells me he has had dizziness off and on for several years. He says the dizziness is worse when he is standing upright particular brushing his teeth or shaving. He feels better lying down. No vision changes or unusual unilateral numbness or weakness. He has had a few spells of sudden loss of consciousness the most recent in January 2022. He also feels like his imbalance is getting worse within the last 12 months. He needs to use a Rollator and cane. He does have spinal stenosis making it difficult to ambulate sometimes. He uses a stationary bike daily. Further questioning reveals a history of suspected ulcerative colitis on sulfasalazine for many years. Ultimately had a colectomy. However it sounds like recent GI consultation suggest he never had ulcerative colitis. He stopped sulfasalazine a couple years ago.   He has numbness and tingling in both of his feet and fingers on both sides. He still drinks nightly a large glass of wine every night. Stop smoking in . He is retired crime .        Review of Systems   Eyes: Negative for double vision. Neurological: Positive for dizziness and sensory change. Psychiatric/Behavioral: The patient is nervous/anxious and has insomnia. All other systems reviewed and are negative. Past Medical History:   Diagnosis Date    Allergic rhinitis     Arthritis     Back pain     Cancer ([facility]     Basal cell - face    Chronic pain lower back    plus many other joints    History of basal cell carcinoma (BCC) of skin 2019    left side of face    Hypertension     Hypogonadism male     PUD (peptic ulcer disease)     [facility]    Ulcerative colitis     Unspecified essential hypertension 2010     Family History   Problem Relation Age of Onset    Cancer Mother         ovarian,colon    Dementia Sister     Diabetes Sister      Social History     Socioeconomic History    Marital status:      Spouse name: Not on file    Number of children: Not on file    Years of education: Not on file    Highest education level: Not on file   Occupational History    Not on file   Tobacco Use    Smoking status: Former Smoker     Packs/day: 1.00     Years: 30.00     Pack years: 30.00     Quit date: 1995     Years since quittin.5    Smokeless tobacco: Never Used    Tobacco comment: quit smoking cigarettes 20 yrs ago   Vaping Use    Vaping Use: Never used   Substance and Sexual Activity    Alcohol use:  Yes     Alcohol/week: 25.0 standard drinks     Types: 25 Glasses of wine per week     Comment: occasional    Drug use: No    Sexual activity: Never   Other Topics Concern    Not on file   Social History Narrative    Not on file     Social Determinants of Health     Financial Resource Strain:     Difficulty of Paying Living Expenses: Not on file   Food Insecurity:     Worried About Running Out of Food in the Last Year: Not on file    Enedelia of Food in the Last Year: Not on file   Transportation Needs:     Lack of Transportation (Medical): Not on file    Lack of Transportation (Non-Medical): Not on file   Physical Activity:     Days of Exercise per Week: Not on file    Minutes of Exercise per Session: Not on file   Stress:     Feeling of Stress : Not on file   Social Connections:     Frequency of Communication with Friends and Family: Not on file    Frequency of Social Gatherings with Friends and Family: Not on file    Attends Restoration Services: Not on file    Active Member of [address] [place] [address] [employer] or Organizations: Not on file    Attends Club or Organization Meetings: Not on file    Marital Status: Not on file   Intimate Partner Violence:     Fear of Current or Ex-Partner: Not on file    Emotionally Abused: Not on file    Physically Abused: Not on file    Sexually Abused: Not on file   Housing Stability:     Unable to Pay for Housing in the Last Year: Not on file    Number of [place] in the Last Year: Not on file    Unstable Housing in the Last Year: Not on file     Allergies   Allergen Reactions    Lanolin Itching    No Known Allergies Other (comments)         Current Outpatient Medications   Medication Sig    metoprolol succinate (TOPROL-XL) 25 mg XL tablet     Myrbetriq 25 mg ER tablet     Gemtesa 75 mg tab     mirtazapine (REMERON) 15 mg tablet Take 1 Tablet by mouth nightly.  amLODIPine (NORVASC) 5 mg tablet Take 1.5 Tablets by mouth daily. Further refills need to come from Dr. Nettie May    rosuvastatin (CRESTOR) 10 mg tablet TAKE ONE TABLET BY MOUTH ONCE NIGHTLY    traMADoL (ULTRAM) 50 mg tablet Take 50 mg by mouth daily as needed for Pain.  eplerenone (INSPRA) 25 mg tablet Take 25 mg by mouth daily.  Eliquis 5 mg tablet TAKE ONE TABLET BY MOUTH TWICE A DAY    PSYLLIUM SEED, WITH DEXTROSE, (FIBER PO) Take  by mouth.     MULTIVITAMIN PO Take  by mouth. Takes one po daily.  cephALEXin (KEFLEX) 500 mg capsule Take 1 Capsule by mouth three (3) times daily.  tolterodine ER (DETROL LA) 4 mg ER capsule TAKE ONE CAPSULE BY MOUTH DAILY    cyanocobalamin, vitamin B-12, (VITAMIN B12 PO) Take  by mouth. (Patient not taking: Reported on 3/29/2022)    cholecalciferol, VITAMIN D3, (VITAMIN D3) 5,000 unit tab tablet Take  by mouth daily. (Patient not taking: Reported on 3/29/2022)     No current facility-administered medications for this visit. Neurologic Exam     Mental Status   WD/WN adult in NAD, normal grooming  VSS  A&O x 3 a little anxious and irritable today    PERRL, nonicteric  Face is covered  Speech is  clear  No limb ataxia. No abnl movements. Moving all extemities spontaneously and symmetric  Wide cautious gait with Rollator           Visit Vitals  BP (!) 162/94 (BP 1 Location: Right upper arm, BP Patient Position: Sitting)   Pulse 80   Ht 6' 2\" (1.88 m)   Wt 282 lb (127.9 kg)   SpO2 98%   BMI 36.21 kg/m²       Assessment and Plan   Diagnoses and all orders for this visit:    1. Middle cerebral artery aneurysm  -     REFERRAL TO NEUROINTERVENTIONAL SURGERY    2. Axonal sensorimotor neuropathy    3. Benign paroxysmal positional vertigo, unspecified laterality  -     REFERRAL TO ENT-OTOLARYNGOLOGY    4. Excessive daytime sleepiness  -     REFERRAL TO PULMONARY DISEASE    Other orders  -     mirtazapine (REMERON) 15 mg tablet; Take 1 Tablet by mouth nightly. 71-year-old gentleman who has persistent dizziness very positional in nature and given the benign appearing CTA study I do not think he has vertebrobasilar insufficiency. I think this is more peripheral in etiology so I am sending him to ENT for an opinion and further management. I offered him vestibular therapy but he would like to stay with home PT OT for now. Will defer to ENT.   I explained to him the anatomy of the vestibular system to he and his wife.  He has neuropathy which I am not surprised by. He has multiple reasons for neuropathy. His balance is poor because he does not feel sensation of the ground very well with his feet. I do not think this will get better. He needs to be very diligent about keeping his health as good as possible. Limit wine. He has degenerative disc disease probably contributing as well. He needs to be diligent about walking as much as he can every day as tolerated. Complete his therapies at home. I am concerned about the very poor quality sleep as he may have undiagnosed sleep apnea which will worsen all of the above symptoms and increases stroke risk. I would like him to do a sleep study at [facility]. I placed an order. The right middle cerebral artery aneurysm is incidental.  I am sending him to neuro interventional for an opinion to make sure there is not anything to do about this issue. It has nothing to do with his symptoms in question. He is also having a lot of increased anxiety which I think is preventing him from doing as much as he can physically. I am going to have him start Remeron at bedtime to help with mood and possibly his sleep quality. I would like to see him in 6 months. 45 minutes of time was taken in total reviewing the medical record to include the EMG results and CTA results, face-to-face time with he and his wife discussing all of the plans above and referrals and results, and time completing medical documentation today. I reviewed and decided to continue the current medications. This clinical note was dictated with an electronic dictation software that can make unintentional errors. If there are any questions, please contact me directly for clarification.       [address] [facility], [facility] [address]  Diplomate RIZWANN

## 2022-07-22 ENCOUNTER — DOCUMENTATION ONLY (OUTPATIENT)
Dept: NEUROLOGY | Age: 77
End: 2022-07-22

## 2022-07-22 NOTE — PROGRESS NOTES
Referral to ENT-OTOLARYNGOLOGY and referral to [facility] are both successfully faxed.

## 2022-08-15 ENCOUNTER — OFFICE VISIT (OUTPATIENT)
Dept: INTERNAL MEDICINE CLINIC | Age: 77
End: 2022-08-15
Payer: MEDICARE

## 2022-08-15 VITALS
RESPIRATION RATE: 16 BRPM | DIASTOLIC BLOOD PRESSURE: 82 MMHG | HEART RATE: 74 BPM | TEMPERATURE: 97.5 F | BODY MASS INDEX: 35.94 KG/M2 | HEIGHT: 74 IN | WEIGHT: 280 LBS | OXYGEN SATURATION: 95 % | SYSTOLIC BLOOD PRESSURE: 153 MMHG

## 2022-08-15 DIAGNOSIS — Z00.00 MEDICARE ANNUAL WELLNESS VISIT, SUBSEQUENT: Primary | ICD-10-CM

## 2022-08-15 DIAGNOSIS — I10 ESSENTIAL HYPERTENSION: ICD-10-CM

## 2022-08-15 DIAGNOSIS — M47.26 OSTEOARTHRITIS OF SPINE WITH RADICULOPATHY, LUMBAR REGION: ICD-10-CM

## 2022-08-15 DIAGNOSIS — F41.1 GAD (GENERALIZED ANXIETY DISORDER): ICD-10-CM

## 2022-08-15 DIAGNOSIS — F33.1 MAJOR DEPRESSIVE DISORDER, RECURRENT, MODERATE (HCC): ICD-10-CM

## 2022-08-15 DIAGNOSIS — E29.1 HYPOGONADISM MALE: ICD-10-CM

## 2022-08-15 DIAGNOSIS — K51.319 ULCERATIVE RECTOSIGMOIDITIS WITH COMPLICATION (HCC): ICD-10-CM

## 2022-08-15 DIAGNOSIS — E66.01 SEVERE OBESITY (BMI 35.0-39.9) WITH COMORBIDITY (HCC): ICD-10-CM

## 2022-08-15 DIAGNOSIS — I48.0 PAROXYSMAL ATRIAL FIBRILLATION (HCC): ICD-10-CM

## 2022-08-15 LAB
ALBUMIN SERPL-MCNC: 3.9 G/DL (ref 3.5–5)
ALBUMIN/GLOB SERPL: 1.1 {RATIO} (ref 1.1–2.2)
ALP SERPL-CCNC: 79 U/L (ref 45–117)
ALT SERPL-CCNC: 41 U/L (ref 12–78)
ANION GAP SERPL CALC-SCNC: 9 MMOL/L (ref 5–15)
AST SERPL-CCNC: 32 U/L (ref 15–37)
BASOPHILS # BLD: 0.1 K/UL (ref 0–0.1)
BASOPHILS NFR BLD: 1 % (ref 0–1)
BILIRUB SERPL-MCNC: 0.5 MG/DL (ref 0.2–1)
BUN SERPL-MCNC: 26 MG/DL (ref 6–20)
BUN/CREAT SERPL: 28 (ref 12–20)
CALCIUM SERPL-MCNC: 9.8 MG/DL (ref 8.5–10.1)
CHLORIDE SERPL-SCNC: 108 MMOL/L (ref 97–108)
CHOLEST SERPL-MCNC: 180 MG/DL
CO2 SERPL-SCNC: 25 MMOL/L (ref 21–32)
CREAT SERPL-MCNC: 0.94 MG/DL (ref 0.7–1.3)
DIFFERENTIAL METHOD BLD: ABNORMAL
EOSINOPHIL # BLD: 0.4 K/UL (ref 0–0.4)
EOSINOPHIL NFR BLD: 5 % (ref 0–7)
ERYTHROCYTE [DISTWIDTH] IN BLOOD BY AUTOMATED COUNT: 13.3 % (ref 11.5–14.5)
GLOBULIN SER CALC-MCNC: 3.5 G/DL (ref 2–4)
GLUCOSE SERPL-MCNC: 129 MG/DL (ref 65–100)
HCT VFR BLD AUTO: 45.2 % (ref 36.6–50.3)
HDLC SERPL-MCNC: 52 MG/DL
HDLC SERPL: 3.5 {RATIO} (ref 0–5)
HGB BLD-MCNC: 15.3 G/DL (ref 12.1–17)
IMM GRANULOCYTES # BLD AUTO: 0.1 K/UL (ref 0–0.04)
IMM GRANULOCYTES NFR BLD AUTO: 1 % (ref 0–0.5)
LDLC SERPL CALC-MCNC: 90.4 MG/DL (ref 0–100)
LYMPHOCYTES # BLD: 2.1 K/UL (ref 0.8–3.5)
LYMPHOCYTES NFR BLD: 23 % (ref 12–49)
MCH RBC QN AUTO: 34.1 PG (ref 26–34)
MCHC RBC AUTO-ENTMCNC: 33.8 G/DL (ref 30–36.5)
MCV RBC AUTO: 100.7 FL (ref 80–99)
MONOCYTES # BLD: 0.9 K/UL (ref 0–1)
MONOCYTES NFR BLD: 10 % (ref 5–13)
NEUTS SEG # BLD: 5.7 K/UL (ref 1.8–8)
NEUTS SEG NFR BLD: 60 % (ref 32–75)
NRBC # BLD: 0 K/UL (ref 0–0.01)
NRBC BLD-RTO: 0 PER 100 WBC
PLATELET # BLD AUTO: 153 K/UL (ref 150–400)
PMV BLD AUTO: 10.3 FL (ref 8.9–12.9)
POTASSIUM SERPL-SCNC: 4.1 MMOL/L (ref 3.5–5.1)
PROT SERPL-MCNC: 7.4 G/DL (ref 6.4–8.2)
RBC # BLD AUTO: 4.49 M/UL (ref 4.1–5.7)
SODIUM SERPL-SCNC: 142 MMOL/L (ref 136–145)
TRIGL SERPL-MCNC: 188 MG/DL (ref ?–150)
TSH SERPL DL<=0.05 MIU/L-ACNC: 1.77 UIU/ML (ref 0.36–3.74)
VLDLC SERPL CALC-MCNC: 37.6 MG/DL
WBC # BLD AUTO: 9.2 K/UL (ref 4.1–11.1)

## 2022-08-15 PROCEDURE — G9711 PT HX TOT COL OR COLON CA: HCPCS | Performed by: INTERNAL MEDICINE

## 2022-08-15 PROCEDURE — G9717 DOC PT DX DEP/BP F/U NT REQ: HCPCS | Performed by: INTERNAL MEDICINE

## 2022-08-15 PROCEDURE — G0463 HOSPITAL OUTPT CLINIC VISIT: HCPCS | Performed by: INTERNAL MEDICINE

## 2022-08-15 PROCEDURE — 99214 OFFICE O/P EST MOD 30 MIN: CPT | Performed by: INTERNAL MEDICINE

## 2022-08-15 PROCEDURE — G8753 SYS BP > OR = 140: HCPCS | Performed by: INTERNAL MEDICINE

## 2022-08-15 PROCEDURE — 1101F PT FALLS ASSESS-DOCD LE1/YR: CPT | Performed by: INTERNAL MEDICINE

## 2022-08-15 PROCEDURE — G0439 PPPS, SUBSEQ VISIT: HCPCS | Performed by: INTERNAL MEDICINE

## 2022-08-15 PROCEDURE — G8417 CALC BMI ABV UP PARAM F/U: HCPCS | Performed by: INTERNAL MEDICINE

## 2022-08-15 PROCEDURE — G8427 DOCREV CUR MEDS BY ELIG CLIN: HCPCS | Performed by: INTERNAL MEDICINE

## 2022-08-15 PROCEDURE — G8536 NO DOC ELDER MAL SCRN: HCPCS | Performed by: INTERNAL MEDICINE

## 2022-08-15 PROCEDURE — G8754 DIAS BP LESS 90: HCPCS | Performed by: INTERNAL MEDICINE

## 2022-08-15 RX ORDER — CHOLECALCIFEROL (VITAMIN D3) 50 MCG
CAPSULE ORAL
COMMUNITY

## 2022-08-15 RX ORDER — MIRTAZAPINE 30 MG/1
30 TABLET, FILM COATED ORAL
Qty: 90 TABLET | Refills: 2 | Status: SHIPPED | OUTPATIENT
Start: 2022-08-15

## 2022-08-15 RX ORDER — MIRABEGRON 25 MG/1
25 TABLET, FILM COATED, EXTENDED RELEASE ORAL DAILY
COMMUNITY
Start: 2022-08-15

## 2022-08-15 RX ORDER — METOPROLOL SUCCINATE 25 MG/1
25 TABLET, EXTENDED RELEASE ORAL DAILY
COMMUNITY
Start: 2022-08-15

## 2022-08-15 RX ORDER — OXYBUTYNIN CHLORIDE 5 MG/1
5 TABLET ORAL
COMMUNITY

## 2022-08-15 NOTE — PATIENT INSTRUCTIONS
Medicare Wellness Visit, Male    The best way to live healthy is to have a lifestyle where you eat a well-balanced diet, exercise regularly, limit alcohol use, and quit all forms of tobacco/nicotine, if applicable. Regular preventive services are another way to keep healthy. Preventive services (vaccines, screening tests, monitoring & exams) can help personalize your care plan, which helps you manage your own care. Screening tests can find health problems at the earliest stages, when they are easiest to treat. [facility] follows the current, evidence-based guidelines published by the [facility] Nato ([facility]) when recommending preventive services for our patients. Because we follow these guidelines, sometimes recommendations change over time as research supports it. (For example, a prostate screening blood test is no longer routinely recommended for men with no symptoms). Of course, you and your doctor may decide to screen more often for some diseases, based on your risk and co-morbidities (chronic disease you are already diagnosed with). Preventive services for you include:  - Medicare offers their members a free annual wellness visit, which is time for you and your primary care provider to discuss and plan for your preventive service needs. Take advantage of this benefit every year!  -All adults over age 72 should receive the recommended pneumonia vaccines. Current USPSTF guidelines recommend a series of two vaccines for the best pneumonia protection.   -All adults should have a flu vaccine yearly and tetanus vaccine every 10 years.  -All adults age 48 and older should receive the shingles vaccines (series of two vaccines).        -All adults age 38-68 who are overweight should have a diabetes screening test once every three years.   -Other screening tests & preventive services for persons with diabetes include: an eye exam to screen for diabetic retinopathy, a kidney function test, a foot exam, and stricter control over your cholesterol.   -Cardiovascular screening for adults with routine risk involves an electrocardiogram (ECG) at intervals determined by the provider.   -Colorectal cancer screening should be done for adults age 54-65 with no increased risk factors for colorectal cancer. There are a number of acceptable methods of screening for this type of cancer. Each test has its own benefits and drawbacks. Discuss with your provider what is most appropriate for you during your annual wellness visit. The different tests include: colonoscopy (considered the best screening method), a fecal occult blood test, a fecal DNA test, and sigmoidoscopy.  -All adults born between [facility] should be screened once for Hepatitis C.  -An Abdominal Aortic Aneurysm (AAA) Screening is recommended for men age 73-68 who has ever smoked in their lifetime.      Here is a list of your current Health Maintenance items (your personalized list of preventive services) with a due date:  Health Maintenance Due   Topic Date Due    Cholesterol Test   08/13/2022

## 2022-08-15 NOTE — PROGRESS NOTES
This is the Subsequent Medicare Annual Wellness Exam, performed 12 months or more after the Initial AWV or the last Subsequent AWV    I have reviewed the patient's medical history in detail and updated the computerized patient record. Also for follow-up of his health issues. Overall he has been improving. His bladder issues have improved with the treatment by urology. He does note some occasional constipation. He denies any blood in his stools. His back pain and leg weakness have improved with home physical therapy and Occupational Therapy. He does note some increased anxiety and they wondered about treating that. He has been taking his mirtazapine in the morning time as he was unclear about evening dosing. No chest pains or shortness of breath. No cough or wheeze. No change in bowel or bladder habits otherwise. ROS - Per HPI    Physical Examination: General appearance - alert, well appearing, and in no distress  Ears - bilateral TM's and external ear canals normal  Mouth - mucous membranes moist, pharynx normal without lesions  Neck - supple, no significant adenopathy  Lymphatics - no palpable lymphadenopathy, no hepatosplenomegaly  Chest - clear to auscultation, no wheezes, rales or rhonchi, symmetric air entry  Heart - normal rate, regular rhythm, normal S1, S2, no murmurs, rubs, clicks or gallops  Abdomen - soft, nontender, nondistended, no masses or organomegaly  Back exam - full range of motion, no tenderness, palpable spasm or pain on motion  Neurological - alert, oriented, normal speech, no focal findings or movement disorder noted  Musculoskeletal - no joint tenderness, deformity or swelling      Assessment/Plan   Education and counseling provided:  Are appropriate based on today's review and evaluation  End-of-Life planning (with patient's consent)  Diabetes screening test    1. Medicare annual wellness visit, subsequent  2. Essential hypertension-blood pressure slightly elevated today. Will monitor at home before changing meds. -     LIPID PANEL; Future  -     METABOLIC PANEL, COMPREHENSIVE; Future  -     CBC WITH AUTOMATED DIFF; Future  -     TSH 3RD GENERATION; Future  3. Paroxysmal atrial fibrillation (HCC)-appears stable. -     TSH 3RD GENERATION; Future  4. Major depressive disorder, recurrent, moderate ([address]-? Controlled. Given his increased anxiety, will increase mirtazapine to 30 mg a day and he will report how he is doing in a month. 5. Ulcerative rectosigmoiditis with complication ([address]  6. Hypogonadism male-stable on no meds. 7. Osteoarthritis of spine with radiculopathy, lumbar region-continue physical therapy. 8. Severe obesity (BMI 35.0-39. 9) with comorbidity (HCC)-continue to diet and work on exercise for weight loss. 9. CATARINO (generalized anxiety disorder)       Depression Risk Factor Screening     3 most recent PHQ Screens 8/15/2022   Little interest or pleasure in doing things Not at all   Feeling down, depressed, irritable, or hopeless Not at all   Total Score PHQ 2 0       Alcohol & Drug Abuse Risk Screen    Do you average more than 1 drink per night or more than 7 drinks a week: Yes    In the past three months have you have had more than 4 drinks containing alcohol on one occasion: Yes          Functional Ability and Level of Safety    Hearing: Hearing is good. Activities of Daily Living: The home contains: no safety equipment. Patient does total self care      Ambulation: with difficulty, uses a cane and walker     Fall Risk:  Fall Risk Assessment, last 12 mths 8/15/2022   Able to walk? Yes   Fall in past 12 months? 0   Do you feel unsteady? 0   Are you worried about falling 0   Is TUG test greater than 12 seconds? -   Is the gait abnormal? -   Number of falls in past 12 months -   Fall with injury?  -      Abuse Screen:  Patient is not abused       Cognitive Screening    Has your family/caregiver stated any concerns about your memory: no         Health Maintenance Due     Health Maintenance Due   Topic Date Due    Lipid Screen  08/13/2022       Patient Care Team   Patient Care Team:  Ramón Borges MD as PCP - General  Ramón Borges MD as PCP - [facility]  Berna Vargas MD (Gastroenterology)  Rhett Santiago MD (Ophthalmology)  Terry Wells MD (Cardiovascular Disease Physician)    History     Patient Active Problem List   Diagnosis Code    Hypogonadism male E29.1    Essential hypertension I10    UC (ulcerative colitis) ([facility] K51.90    Anemia D64.9    DJD (degenerative joint disease), lumbar M47.816    Open angle glaucoma suspect H40.009    Advance directive on file Z78.9    Severe obesity (BMI 35.0-39. 9) with comorbidity ([facility] E66.01    Major depressive disorder, recurrent, moderate F33.1    Paroxysmal atrial fibrillation (HCC) I48.0     Past Medical History:   Diagnosis Date    Allergic rhinitis     Arthritis     Back pain     Cancer ([facility] 2019    Basal cell - face    Chronic pain lower back    plus many other joints    History of basal cell carcinoma (BCC) of skin 07/01/2019    left side of face    Hypertension     Hypogonadism male     PUD (peptic ulcer disease)     UC (ulcerative colitis) ([facility] 1/20/2010    Ulcerative colitis     Unspecified essential hypertension 1/20/2010      Past Surgical History:   Procedure Laterality Date    HX COLONOSCOPY  02/07/2017    3 yr f/u - Dr. Marlin Fischer MOHS PROCEDURES Left 07/01/2019    basal cell carcinoma left side of face. HX SMALL BOWEL RESECTION      HX TONSILLECTOMY      HX TOTAL COLECTOMY  06/24/2020    partial colectomy    HX WISDOM TEETH EXTRACTION       Current Outpatient Medications   Medication Sig Dispense Refill    oxybutynin (DITROPAN) 5 mg tablet Take 5 mg by mouth nightly as needed for Bladder Spasms.       B.animalis,bifid,infantis,long (Probiotic 4X) 10-15 mg TbEC Take  by mouth.      metoprolol succinate (TOPROL-XL) 25 mg XL tablet       Myrbetriq 25 mg ER tablet       amLODIPine (NORVASC) 5 mg tablet Take 1.5 Tablets by mouth daily. Further refills need to come from Dr. Ghada [address] Tablet 0    rosuvastatin (CRESTOR) 10 mg tablet TAKE ONE TABLET BY MOUTH ONCE NIGHTLY 90 Tablet 0    traMADoL (ULTRAM) 50 mg tablet Take 50 mg by mouth daily as needed for Pain.      eplerenone (INSPRA) 25 mg tablet Take 25 mg by mouth daily. Eliquis 5 mg tablet TAKE ONE TABLET BY MOUTH TWICE A  Tablet 2    cyanocobalamin, vitamin B-12, (VITAMIN B12 PO) Take  by mouth. cholecalciferol, VITAMIN D3, (VITAMIN D3) 5,000 unit tab tablet Take  by mouth daily. PSYLLIUM SEED, WITH DEXTROSE, (FIBER PO) Take  by mouth. MULTIVITAMIN PO Take  by mouth. Takes one po daily. Gemtesa 75 mg tab        Allergies   Allergen Reactions    Lanolin Itching    No Known Allergies Other (comments)       Family History   Problem Relation Age of Onset    Cancer Mother         ovarian,colon    Dementia Sister     Diabetes Sister      Social History     Tobacco Use    Smoking status: Former     Packs/day: 1.00     Years: 30.00     Pack years: 30.00     Types: Cigarettes     Quit date: 1995     Years since quittin.6    Smokeless tobacco: Never    Tobacco comments:     quit smoking cigarettes 20 yrs ago   Substance Use Topics    Alcohol use:  Yes     Alcohol/week: 14.0 standard drinks     Types: 14 Glasses of wine per week         Ilene Last MD

## 2022-08-18 RX ORDER — ROSUVASTATIN CALCIUM 10 MG/1
TABLET, COATED ORAL
Qty: 90 TABLET | Refills: 0 | Status: SHIPPED | OUTPATIENT
Start: 2022-08-18

## 2022-08-19 ENCOUNTER — OFFICE VISIT (OUTPATIENT)
Dept: NEUROSURGERY | Age: 77
End: 2022-08-19
Payer: MEDICARE

## 2022-08-19 VITALS
BODY MASS INDEX: 36.45 KG/M2 | HEIGHT: 74 IN | HEART RATE: 108 BPM | OXYGEN SATURATION: 97 % | TEMPERATURE: 97.4 F | DIASTOLIC BLOOD PRESSURE: 82 MMHG | WEIGHT: 284 LBS | SYSTOLIC BLOOD PRESSURE: 122 MMHG

## 2022-08-19 DIAGNOSIS — I67.1 CEREBRAL ANEURYSM: Primary | ICD-10-CM

## 2022-08-19 PROCEDURE — G8417 CALC BMI ABV UP PARAM F/U: HCPCS | Performed by: RADIOLOGY

## 2022-08-19 PROCEDURE — 1123F ACP DISCUSS/DSCN MKR DOCD: CPT | Performed by: RADIOLOGY

## 2022-08-19 PROCEDURE — 1101F PT FALLS ASSESS-DOCD LE1/YR: CPT | Performed by: RADIOLOGY

## 2022-08-19 PROCEDURE — G9711 PT HX TOT COL OR COLON CA: HCPCS | Performed by: RADIOLOGY

## 2022-08-19 PROCEDURE — G8752 SYS BP LESS 140: HCPCS | Performed by: RADIOLOGY

## 2022-08-19 PROCEDURE — 99202 OFFICE O/P NEW SF 15 MIN: CPT | Performed by: RADIOLOGY

## 2022-08-19 PROCEDURE — G8427 DOCREV CUR MEDS BY ELIG CLIN: HCPCS | Performed by: RADIOLOGY

## 2022-08-19 PROCEDURE — G8754 DIAS BP LESS 90: HCPCS | Performed by: RADIOLOGY

## 2022-08-19 PROCEDURE — G9717 DOC PT DX DEP/BP F/U NT REQ: HCPCS | Performed by: RADIOLOGY

## 2022-08-19 PROCEDURE — G8536 NO DOC ELDER MAL SCRN: HCPCS | Performed by: RADIOLOGY

## 2022-08-19 NOTE — LETTER
8/19/2022    Patient: Maru Mcginnis   YOB: 1945   Date of Visit: 8/19/2022     Carlos. Soila Epps MD  [address] [place] [address] Dr Asher TORRES [address] [address]  [address] [facility], [address] [place] [address]  Via In [facility] [address]    Dear Carlos. MD Juanjo Mcclellan DO,      Thank you for referring Mr. Jose Alejandro Clancy to [address] [place] [address] for evaluation. My notes for this consultation are attached. If you have questions, please do not hesitate to call me. I look forward to following your patient along with you.       Sincerely,    Samantha Schneider MD

## 2022-08-19 NOTE — PROGRESS NOTES
Neurointerventional Surgery Clinic Note    Patient: Teri Koenig MRN: [id number]  SSN: [id number]    YOB: 1945  Age: 77 y.o. Sex: male      Subjective:      Teri Koenig is a 77 y.o. male with multiple medical problems who presents on referral from Dr. Ad Sloan for evaluation of cerebral aneurysm. Patient reports positional dizziness and unsteady gait. He walks with a Rollator. He denies any other focal neurological complaints. Past Medical History:   Diagnosis Date    A-fib [facility])     Allergic rhinitis     Anxiety     Arthritis     Back pain     Cancer ([facility] [address] 2019    Basal cell - face    Chronic pain lower back    plus many other joints    Depression     Fatigue     History of basal cell carcinoma (BCC) of skin 2019    left side of face    Hypertension     Hypogonadism male     Memory disorder     Muscle weakness     PUD (peptic ulcer disease)     UC (ulcerative colitis) ([facility] [address] 2010    Ulcerative colitis     Unspecified essential hypertension 2010    Vertigo      Past Surgical History:   Procedure Laterality Date    HX COLONOSCOPY  2017    3 yr f/u - Dr. Savannah Childs Left 2019    basal cell carcinoma left side of face. HX SMALL BOWEL RESECTION      HX TONSILLECTOMY      HX TOTAL COLECTOMY  2020    partial colectomy    HX WISDOM TEETH EXTRACTION        Family History   Problem Relation Age of Onset    Cancer Mother         ovarian,colon    Migraines Sister     Headache Sister     Dementia Sister     Diabetes Sister      Social History     Tobacco Use    Smoking status: Former     Packs/day: 1.00     Years: 30.00     Pack years: 30.00     Types: Cigarettes     Quit date: 1995     Years since quittin.6    Smokeless tobacco: Never    Tobacco comments:     quit smoking cigarettes 20 yrs ago   Substance Use Topics    Alcohol use:  Yes     Alcohol/week: 14.0 standard drinks     Types: 14 Glasses of wine per week Current Outpatient Medications   Medication Sig Dispense Refill    rosuvastatin (CRESTOR) 10 mg tablet TAKE ONE TABLET BY MOUTH ONCE NIGHTLY 90 Tablet 0    oxybutynin (DITROPAN) 5 mg tablet Take 5 mg by mouth nightly as needed for Bladder Spasms. B.animalis,bifid,infantis,long (Probiotic 4X) 10-15 mg TbEC Take  by mouth.      metoprolol succinate (TOPROL-XL) 25 mg XL tablet Take 1 Tablet by mouth in the morning. Myrbetriq 25 mg ER tablet Take 1 Tablet by mouth in the morning. mirtazapine (REMERON) 30 mg tablet Take 1 Tablet by mouth nightly. 90 Tablet 2    amLODIPine (NORVASC) 5 mg tablet Take 1.5 Tablets by mouth daily. Further refills need to come from Dr. Kip Montes 135 Tablet 0    traMADoL (ULTRAM) 50 mg tablet Take 50 mg by mouth daily as needed for Pain.      eplerenone (INSPRA) 25 mg tablet Take 25 mg by mouth daily. Eliquis 5 mg tablet TAKE ONE TABLET BY MOUTH TWICE A  Tablet 2    cyanocobalamin, vitamin B-12, (VITAMIN B12 PO) Take  by mouth. cholecalciferol, VITAMIN D3, (VITAMIN D3) 5,000 unit tab tablet Take  by mouth daily. PSYLLIUM SEED, WITH DEXTROSE, (FIBER PO) Take  by mouth. MULTIVITAMIN PO Take  by mouth. Takes one po daily. Allergies   Allergen Reactions    Lanolin Itching    No Known Allergies Other (comments)       Review of Systems:  Pertinent items are noted in the History of Present Illness. Objective:     Vitals:    08/19/22 0954   BP: 122/82   Pulse: (!) 108   Temp: 97.4 °F (36.3 °C)   TempSrc: Temporal   SpO2: 97%   Weight: 284 lb (128.8 kg)   Height: 6' 2\" (1.88 m)        Physical Exam:  GENERAL: alert, cooperative, no distress, appears stated age  EYE: negative  NECK: no carotid bruit  LUNG: clear to auscultation bilaterally  HEART: irregular rhythm  EXTREMITIES:  extremities normal, atraumatic, no cyanosis or edema    Neurologic Exam:  Mental Status:  Alert and oriented x 4. Appropriate affect, mood and behavior. Language:    Normal fluency, repetition, comprehension and naming. Cranial Nerves:   Pupils equal, round and reactive to light. Visual fields full to confrontation. Extraocular movements intact. Facial sensation intact V1 - V3. Full facial strength, no asymmetry. Hearing intact bilaterally. No dysarthria. Tongue protrudes to midline, palate elevates symmetrically. Shoulder shrug 5/5 bilaterally. Motor:    No pronator drift. Bulk and tone normal.      5/5 power in all extremities proximally and distally. No involuntary movements. Sensation:    Sensation intact throughout to light touch    Reflexes:    Deferred    Coordination & Gait: FTN intact. Patient walks with a Rollator      Imaging:  I personally reviewed the following imaging studies. The impressions listed below are those of the interpreting radiologist(s). My comments are in bold. CTA head 5/19/2022:  Tiny aneurysm at the bifurcation of the right MCA. There is no evidence of dissection or hemodynamically significant stenosis. .  Moderate chronic perivascular ischemic change and moderate cerebral atrophy. There is no intracranial mass, hemorrhage or evidence of acute infarction. No acute intracranial process is identified. .   Right MCA bifurcation finding is an infundibulum. 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 Carotid duplex 6/10/2021:  1)  10-49% stenosis of the right internal carotid artery. 2)  10-49% stenosis of the left internal carotid artery. 3)  Vertebral arteries are patent with antegrade flow. Assessment:   77 y.o. male with multiple medical problems who presents on referral from Dr. Danica Tom for evaluation of cerebral aneurysm. Patient reports positional dizziness and unsteady gait. He walks with a Rollator. He denies any other focal neurological complaints.     Patient is on Eliquis for atrial fibrillation. Neurologic exam is normal aside from balance issues. Patient walks with a Rollator. We reviewed his CTA performed 5/19/2022. There is no evidence of cerebral aneurysm. The finding reported on the CTA report in the right MCA region is an infundibulum. In addition, there is evidence of chronic dissection with small pseudoaneurysm involving the distal cervical right ICA. No imaging surveillance or clinic follow-up is indicated, since there is no cerebral aneurysm. Patient is already on Eliquis for atrial fibrillation, and this should provide adequate stroke prevention for the distal cervical chronic dissection and small pseudoaneurysm. I advised the patient to continue to follow-up with Dr. Gretchen Segovia. Plan:     -Follow-up as needed    Thank you for allowing me to participate in the care of this patient. Greater than 15 minutes were spent in patient management, greater than half of which was spent in counseling and coordination of care.         Signed By: Arnaldo Rocha MD     August 19, 2022

## 2022-08-19 NOTE — PROGRESS NOTES
New patient referred by Dr Jenny Blanc presenting with Cerebral aneurysm. Spouse at visit with patient. Patient reports continued positional dizziness and unsteady gait for the past few months. Reports dizziness when he stands and walks. Patient ambulates with Rolator. Continues with Physical therapy 2 times a week at home. No acute problems reported. Denies headaches, numbness or tingling, blurred or double vision.

## 2022-08-25 ENCOUNTER — TRANSCRIBE ORDER (OUTPATIENT)
Dept: SCHEDULING | Age: 77
End: 2022-08-25

## 2022-08-25 DIAGNOSIS — H83.2X3 LABYRINTHINE DYSFUNCTION, BILATERAL: Primary | ICD-10-CM

## 2022-08-25 DIAGNOSIS — H90.3 SENSORINEURAL HEARING LOSS, BILATERAL: ICD-10-CM

## 2022-09-16 ENCOUNTER — TELEPHONE (OUTPATIENT)
Dept: INTERNAL MEDICINE CLINIC | Age: 77
End: 2022-09-16

## 2022-09-19 ENCOUNTER — TRANSCRIBE ORDER (OUTPATIENT)
Dept: GENERAL RADIOLOGY | Age: 77
End: 2022-09-19

## 2022-09-19 ENCOUNTER — HOSPITAL ENCOUNTER (OUTPATIENT)
Dept: GENERAL RADIOLOGY | Age: 77
Discharge: HOME | End: 2022-09-19
Attending: INTERNAL MEDICINE
Payer: MEDICARE

## 2022-09-19 DIAGNOSIS — R06.89 DYSPNEA AND RESPIRATORY ABNORMALITY: ICD-10-CM

## 2022-09-19 DIAGNOSIS — R06.00 DYSPNEA AND RESPIRATORY ABNORMALITY: Primary | ICD-10-CM

## 2022-09-19 DIAGNOSIS — R06.00 DYSPNEA AND RESPIRATORY ABNORMALITY: ICD-10-CM

## 2022-09-19 DIAGNOSIS — R06.89 DYSPNEA AND RESPIRATORY ABNORMALITY: Primary | ICD-10-CM

## 2022-09-19 PROCEDURE — 71046 X-RAY EXAM CHEST 2 VIEWS: CPT

## 2022-09-21 ENCOUNTER — HOSPITAL ENCOUNTER (OUTPATIENT)
Dept: MRI IMAGING | Age: 77
Discharge: HOME | End: 2022-09-21
Attending: SPECIALIST
Payer: MEDICARE

## 2022-09-21 DIAGNOSIS — H90.3 SENSORINEURAL HEARING LOSS, BILATERAL: ICD-10-CM

## 2022-09-21 DIAGNOSIS — H83.2X3 LABYRINTHINE DYSFUNCTION, BILATERAL: ICD-10-CM

## 2022-09-21 PROCEDURE — A9576 INJ PROHANCE MULTIPACK: HCPCS | Performed by: SPECIALIST

## 2022-09-21 PROCEDURE — 70553 MRI BRAIN STEM W/O & W/DYE: CPT

## 2022-09-21 PROCEDURE — 74011250636 HC RX REV CODE- 250/636: Performed by: SPECIALIST

## 2022-09-21 RX ADMIN — GADOTERIDOL 20 ML: 279.3 INJECTION, SOLUTION INTRAVENOUS at 10:21

## 2022-09-23 ENCOUNTER — E-VISIT (OUTPATIENT)
Dept: INTERNAL MEDICINE CLINIC | Age: 77
End: 2022-09-23
Payer: MEDICARE

## 2022-09-23 DIAGNOSIS — U07.1 COVID-19: Primary | ICD-10-CM

## 2022-09-23 PROCEDURE — 99421 OL DIG E/M SVC 5-10 MIN: CPT | Performed by: INTERNAL MEDICINE

## 2022-09-23 NOTE — PROGRESS NOTES
E-Visit Note:    HPI: per patient questionnaire, this has been reviewed  EXAM: n/a    ----------------------------------  Diagnoses and all orders for this visit:    1. COVID-19    Other orders  -     nirmatrelvir-ritonavir (Paxlovid, EUA,) 300 mg (150 mg x 2)-100 mg; Per package directions  Indications: COVID-19 (emergency use authorization)       PLAN:   Orders Placed This Encounter    nirmatrelvir-ritonavir (Paxlovid, EUA,) 300 mg (150 mg x 2)-100 mg     Sig: Per package directions  Indications: [id number] (emergency use authorization)     Dispense:  1 Box     Refill:  0     Order Specific Question:   Does this patient qualify for COVID-19 antiviral treatment based on criteria for treatment? Answer:   Yes         ----------------------------------  The patient was advised to call if these symptoms worsen or fail to improve as anticipated. 5-10 minutes were spent on the digital evaluation and management of this patient.        María Galindo MD

## 2022-11-15 RX ORDER — ROSUVASTATIN CALCIUM 10 MG/1
TABLET, COATED ORAL
Qty: 90 TABLET | Refills: 0 | Status: SHIPPED | OUTPATIENT
Start: 2022-11-15

## 2022-11-15 NOTE — TELEPHONE ENCOUNTER
Chief Complaint   Patient presents with    Medication Refill     Last Appointment with Dr. Carissa Feng:  8/15/2022  Future Appointments   Date Time Provider Loco Castro   11/18/2022 11:20 AM Africa Lopez DO [facility] AMB

## 2022-11-18 ENCOUNTER — OFFICE VISIT (OUTPATIENT)
Dept: NEUROLOGY | Age: 77
End: 2022-11-18
Payer: MEDICARE

## 2022-11-18 VITALS
SYSTOLIC BLOOD PRESSURE: 136 MMHG | BODY MASS INDEX: 35.94 KG/M2 | HEART RATE: 94 BPM | OXYGEN SATURATION: 97 % | WEIGHT: 280 LBS | HEIGHT: 74 IN | DIASTOLIC BLOOD PRESSURE: 74 MMHG

## 2022-11-18 DIAGNOSIS — H81.10 BENIGN PAROXYSMAL POSITIONAL VERTIGO, UNSPECIFIED LATERALITY: Primary | ICD-10-CM

## 2022-11-18 DIAGNOSIS — G62.89 AXONAL SENSORIMOTOR NEUROPATHY: ICD-10-CM

## 2022-11-18 DIAGNOSIS — G31.9 CEREBRAL ATROPHY (HCC): ICD-10-CM

## 2022-11-18 PROCEDURE — G8752 SYS BP LESS 140: HCPCS | Performed by: PSYCHIATRY & NEUROLOGY

## 2022-11-18 PROCEDURE — 1101F PT FALLS ASSESS-DOCD LE1/YR: CPT | Performed by: PSYCHIATRY & NEUROLOGY

## 2022-11-18 PROCEDURE — 1123F ACP DISCUSS/DSCN MKR DOCD: CPT | Performed by: PSYCHIATRY & NEUROLOGY

## 2022-11-18 PROCEDURE — 99214 OFFICE O/P EST MOD 30 MIN: CPT | Performed by: PSYCHIATRY & NEUROLOGY

## 2022-11-18 PROCEDURE — G8427 DOCREV CUR MEDS BY ELIG CLIN: HCPCS | Performed by: PSYCHIATRY & NEUROLOGY

## 2022-11-18 PROCEDURE — 3078F DIAST BP <80 MM HG: CPT | Performed by: PSYCHIATRY & NEUROLOGY

## 2022-11-18 PROCEDURE — G9717 DOC PT DX DEP/BP F/U NT REQ: HCPCS | Performed by: PSYCHIATRY & NEUROLOGY

## 2022-11-18 PROCEDURE — G8754 DIAS BP LESS 90: HCPCS | Performed by: PSYCHIATRY & NEUROLOGY

## 2022-11-18 PROCEDURE — 3074F SYST BP LT 130 MM HG: CPT | Performed by: PSYCHIATRY & NEUROLOGY

## 2022-11-18 PROCEDURE — G8417 CALC BMI ABV UP PARAM F/U: HCPCS | Performed by: PSYCHIATRY & NEUROLOGY

## 2022-11-18 PROCEDURE — G8536 NO DOC ELDER MAL SCRN: HCPCS | Performed by: PSYCHIATRY & NEUROLOGY

## 2022-11-18 NOTE — PROGRESS NOTES
Neurology staff:  I examined the patient in the exam room. I discussed the case and agree [facility] 78[facility]. I see her for history of stroke and neuropathy. Last visit he was having a lot of vertigo. He saw ENT without etiology. MRI was done showing some cerebral atrophy but no acute issue. He went to vestibular therapy which is still ongoing and he is having good improvement with better balance. Less anxiety as a result. He does have neuropathy based on EMG testing. He has multilevel degenerative disc disease. We initiated Remeron for sleep which is been very helpful. She is using a cane and Rollator at home. He remains on Eliquis for stroke prevention given history of stroke. On exam, he is seated upright. No ataxia. No abnormal movements. Face is covered. Speech is clear. 78-year-old gentleman who has various chronic conditions to include stroke, neuropathy both of which are stable. He will continue Eliquis for stroke prevention and I discussed this with him and his wife. Neuropathy is present and will not resolve. He needs to be vigilant about keeping his health under good control. I suspect the neuropathy could be idiopathic possibly with contribution from multilevel degenerative disc disease. He needs to use a cane and a Rollator at all times for better balance. The peripheral vertigo is improving with vestibular PT. Continue that until he is discharged from PT. We can consider a repeat therapy assessment in the spring as needed. I reviewed the MRI results personally and discussed with he and his wife. He does have some cerebral atrophy which I would follow for now. It does not mean he has a diagnosis of dementia but certainly if there are concerns we can send him for neuropsych testing when appropriate. He will follow-up in the spring.   35 minutes of time in total spent reviewing the medical record to include personal review of the neuroimaging, face-to-face time with the patient and his wife, and time completing documentation today.   [address] [facility],   Neurologist  Diplomate, American Board of Psychiatry and Neurology  Board Certified, Adult Neurology and Brain Injury Medicine

## 2022-11-18 NOTE — PROGRESS NOTES
Chief Complaint   Patient presents with    Dizziness     Dizziness and balance the Pt feels are improved. [facility] is a 69 year old man here for follow up. He was last seen in the office by Dr. Morgan Speaks on 7/15/2022 for dizziness, peripheral neuropathy confirmed by EMG. He is currently in PT/OT at home which he tells me has helped him tremendously. He uses a cane and rollator to get around, no falls. He saw ENT back in 8/2022 and he reports that they didn't find anything. They did order a MRI which was completed. He reports that his anxiety is better now that the dizziness is better. He is on eliquis for stroke prevention. Background:  Mr. Danay Petit is a 56-year-old gentleman with cardiovascular disease, A. fib on Eliquis and other stroke risk factors who was referred to me from cardiology for dizziness. I spoke with he and his wife today personally. He tells me he has had dizziness off and on for several years. He says the dizziness is worse when he is standing upright particular brushing his teeth or shaving. He feels better lying down. No vision changes or unusual unilateral numbness or weakness. He has had a few spells of sudden loss of consciousness the most recent in January 2022. He also feels like his imbalance is getting worse within the last 12 months. He needs to use a Rollator and cane. He does have spinal stenosis making it difficult to ambulate sometimes. He uses a stationary bike daily. Further questioning reveals a history of suspected ulcerative colitis on sulfasalazine for many years. Ultimately had a colectomy. However it sounds like recent GI consultation suggest he never had ulcerative colitis. He stopped sulfasalazine a couple years ago. He has numbness and tingling in both of his feet and fingers on both sides. He still drinks nightly a large glass of wine every night. Stop smoking in 1985.   He is retired crime .        Review of Systems   Eyes: Negative for double vision. Neurological:  Positive for dizziness and sensory change. Psychiatric/Behavioral:  The patient is not nervous/anxious. All other systems reviewed and are negative. Past Medical History:   Diagnosis Date    A-fib ([facility]     Allergic rhinitis     Anxiety     Arthritis     Back pain     Cancer ([facility] 2019    Basal cell - face    Chronic pain lower back    plus many other joints    Depression     Fatigue     History of basal cell carcinoma (BCC) of skin 2019    left side of face    Hypertension     Hypogonadism male     Memory disorder     Muscle weakness     PUD (peptic ulcer disease)     UC (ulcerative colitis) ([facility] 2010    Ulcerative colitis     Unspecified essential hypertension 2010    Vertigo      Family History   Problem Relation Age of Onset    Cancer Mother         ovarian,colon    Migraines Sister     Headache Sister     Dementia Sister     Diabetes Sister      Social History     Socioeconomic History    Marital status:      Spouse name: Not on file    Number of children: Not on file    Years of education: Not on file    Highest education level: Not on file   Occupational History    Not on file   Tobacco Use    Smoking status: Former     Packs/day: 1.00     Years: 30.00     Pack years: 30.00     Types: Cigarettes     Quit date: 1995     Years since quittin.8    Smokeless tobacco: Never    Tobacco comments:     quit smoking cigarettes 20 yrs ago   Vaping Use    Vaping Use: Never used   Substance and Sexual Activity    Alcohol use:  Yes     Alcohol/week: 14.0 standard drinks     Types: 14 Glasses of wine per week    Drug use: No    Sexual activity: Never   Other Topics Concern    Not on file   Social History Narrative    Not on file     Social Determinants of Health     Financial Resource Strain: Low Risk     Difficulty of Paying Living Expenses: Not hard at all   Food Insecurity: No Food Insecurity    Worried About Running Out of Food in the Last Year: Never true    Enedelia of Food in the Last Year: Never true   Transportation Needs: Not on file   Physical Activity: Not on file   Stress: Not on file   Social Connections: Not on file   Intimate Partner Violence: Not on file   Housing Stability: Not on file     Allergies   Allergen Reactions    Lanolin Itching    No Known Allergies Other (comments)         Current Outpatient Medications   Medication Sig    rosuvastatin (CRESTOR) 10 mg tablet TAKE ONE TABLET BY MOUTH ONCE NIGHTLY    oxybutynin (DITROPAN) 5 mg tablet Take 5 mg by mouth nightly as needed for Bladder Spasms.  B.animalis,bifid,infantis,long (Probiotic 4X) 10-15 mg TbEC Take  by mouth.  metoprolol succinate (TOPROL-XL) 25 mg XL tablet Take 1 Tablet by mouth in the morning.  Myrbetriq 25 mg ER tablet Take 1 Tablet by mouth in the morning.  mirtazapine (REMERON) 30 mg tablet Take 1 Tablet by mouth nightly.  amLODIPine (NORVASC) 5 mg tablet Take 1.5 Tablets by mouth daily. Further refills need to come from Dr. Silvia Ribera    traMADoL Julio Pollen) 50 mg tablet Take 50 mg by mouth daily as needed for Pain.  eplerenone (INSPRA) 25 mg tablet Take 25 mg by mouth daily.  Eliquis 5 mg tablet TAKE ONE TABLET BY MOUTH TWICE A DAY    PSYLLIUM SEED, WITH DEXTROSE, (FIBER PO) Take  by mouth.  MULTIVITAMIN PO Take  by mouth. Takes one po daily.  nirmatrelvir-ritonavir (Paxlovid, EUA,) 300 mg (150 mg x 2)-100 mg Per package directions  Indications: COVID-19 (emergency use authorization)    cyanocobalamin, vitamin B-12, (VITAMIN B12 PO) Take  by mouth.  cholecalciferol, VITAMIN D3, (VITAMIN D3) 5,000 unit tab tablet Take  by mouth daily. (Patient not taking: Reported on 11/18/2022)     No current facility-administered medications for this visit.            Neurologic Exam     Mental Status   WD/WN adult in NAD, normal grooming  VSS  A&O x 3    PERRL, nonicteric  Face is covered  Speech is  clear  No limb ataxia. No abnl movements. Moving all extemities spontaneously and symmetric  Wide cautious gait with Rollator/ cane         Visit Vitals  /74   Pulse 94   Ht 6' 2\" (1.88 m)   Wt 280 lb (127 kg)   SpO2 97%   BMI 35.95 kg/m²       Assessment and Plan   Diagnoses and all orders for this visit:    1. Benign paroxysmal positional vertigo, unspecified laterality    2. Axonal sensorimotor neuropathy    3. Cerebral atrophy ([address]    69 year old man with BPPV that is currently doing better with his vestibular PT/OT. He should continue with his therapy for now and we can re-evaluate in the spring if he feels it needs to be reorder. Continue with eliquis for stroke prevention. His balance is doing better. He has multiple reasons for his neuropathy. Talked about his lumbar spine stenosis and how this will not go away, but hopefully with PT and him continuing to stay healthy it will not progress. Complete therapy at home and if we need to reorder we will. Explained MRI findings of cerebral atrophy to patient and wife. We will just watch this for now and if we need to follow up with neuropsych in the future we will. He can follow up in the office in 6 months.        LUISANA Art

## 2023-02-23 ENCOUNTER — OFFICE VISIT (OUTPATIENT)
Dept: INTERNAL MEDICINE CLINIC | Age: 78
End: 2023-02-23
Payer: MEDICARE

## 2023-02-23 VITALS
OXYGEN SATURATION: 97 % | RESPIRATION RATE: 20 BRPM | TEMPERATURE: 97.1 F | WEIGHT: 278 LBS | HEART RATE: 94 BPM | BODY MASS INDEX: 35.68 KG/M2 | HEIGHT: 74 IN | SYSTOLIC BLOOD PRESSURE: 130 MMHG | DIASTOLIC BLOOD PRESSURE: 73 MMHG

## 2023-02-23 DIAGNOSIS — I48.0 PAROXYSMAL ATRIAL FIBRILLATION (HCC): ICD-10-CM

## 2023-02-23 DIAGNOSIS — R73.01 FASTING HYPERGLYCEMIA: ICD-10-CM

## 2023-02-23 DIAGNOSIS — E29.1 HYPOGONADISM MALE: Primary | ICD-10-CM

## 2023-02-23 DIAGNOSIS — K51.319 ULCERATIVE RECTOSIGMOIDITIS WITH COMPLICATION (HCC): ICD-10-CM

## 2023-02-23 DIAGNOSIS — L97.529 TOE ULCER, LEFT, WITH UNSPECIFIED SEVERITY (HCC): ICD-10-CM

## 2023-02-23 DIAGNOSIS — E66.01 SEVERE OBESITY (BMI 35.0-39.9) WITH COMORBIDITY (HCC): ICD-10-CM

## 2023-02-23 DIAGNOSIS — F33.1 MAJOR DEPRESSIVE DISORDER, RECURRENT, MODERATE (HCC): ICD-10-CM

## 2023-02-23 DIAGNOSIS — G31.9 CEREBRAL ATROPHY (HCC): ICD-10-CM

## 2023-02-23 PROCEDURE — 1101F PT FALLS ASSESS-DOCD LE1/YR: CPT | Performed by: INTERNAL MEDICINE

## 2023-02-23 PROCEDURE — G8427 DOCREV CUR MEDS BY ELIG CLIN: HCPCS | Performed by: INTERNAL MEDICINE

## 2023-02-23 PROCEDURE — G9717 DOC PT DX DEP/BP F/U NT REQ: HCPCS | Performed by: INTERNAL MEDICINE

## 2023-02-23 PROCEDURE — G0463 HOSPITAL OUTPT CLINIC VISIT: HCPCS | Performed by: INTERNAL MEDICINE

## 2023-02-23 PROCEDURE — G8417 CALC BMI ABV UP PARAM F/U: HCPCS | Performed by: INTERNAL MEDICINE

## 2023-02-23 PROCEDURE — 99214 OFFICE O/P EST MOD 30 MIN: CPT | Performed by: INTERNAL MEDICINE

## 2023-02-23 PROCEDURE — G8536 NO DOC ELDER MAL SCRN: HCPCS | Performed by: INTERNAL MEDICINE

## 2023-02-23 NOTE — PROGRESS NOTES
HPI:  Bobby Hargrove is a 69y.o. year old male who is here for a hospital follow-up visit. He was admitted there in the last 2 weeks to [facility] with pneumonia. He was discharged after completing antibiotics and given prednisone and albuterol for home. He was already receiving home physical therapy and that has continued. His cough has improved. His strength is slowly improving. He is having no fevers or chills. No sweats. No nausea or vomiting. No change in bowel or bladder habits. He continues to have issues with lower back pain. Past Medical History:   Diagnosis Date    A-fib [facility])     Allergic rhinitis     Anxiety     Arthritis     Back pain     Cancer ([facility] [address] 2019    Basal cell - face    Chronic pain lower back    plus many other joints    Depression     Fatigue     History of basal cell carcinoma (BCC) of skin 07/01/2019    left side of face    Hypertension     Hypogonadism male     Memory disorder     Muscle weakness     PUD (peptic ulcer disease)     UC (ulcerative colitis) ([facility] [address] 01/20/2010    Ulcerative colitis     Unspecified essential hypertension 01/20/2010    Vertigo        Past Surgical History:   Procedure Laterality Date    HX COLONOSCOPY  02/07/2017    3 yr f/u - Dr. Aleisha Meraz Left 07/01/2019    basal cell carcinoma left side of face. HX SMALL BOWEL RESECTION      HX TONSILLECTOMY      HX TOTAL COLECTOMY  06/24/2020    partial colectomy    HX WISDOM TEETH EXTRACTION         Prior to Admission medications    Medication Sig Start Date End Date Taking? Authorizing Provider   rosuvastatin (CRESTOR) 10 mg tablet TAKE ONE TABLET BY MOUTH ONCE NIGHTLY 11/15/22  Yes Mae Hernandez III, MD   B.animalis,bifid,infantis,long (Probiotic 4X) 10-15 mg TbEC Take  by mouth. Yes Provider, Historical   metoprolol succinate (TOPROL-XL) 25 mg XL tablet Take 1 Tablet by mouth in the morning.  8/15/22  Yes Shawn Lorenzana MD   Myrbetriq 25 mg ER tablet Take 1 Tablet by mouth in the morning. 8/15/22  Yes Maria Guadalupe Navarro MD   mirtazapine (REMERON) 30 mg tablet Take 1 Tablet by mouth nightly. 8/15/22  Yes Maria Guadalupe Navarro MD   amLODIPine (NORVASC) 5 mg tablet Take 1.5 Tablets by mouth daily. Further refills need to come from Dr. Carlos Nascimento 6/10/22  Yes Ronan Morton NP   traMADoL (ULTRAM) 50 mg tablet Take 50 mg by mouth daily as needed for Pain. Yes Provider, Historical   eplerenone (INSPRA) 25 mg tablet Take 25 mg by mouth daily. Yes Provider, Historical   Eliquis 5 mg tablet TAKE ONE TABLET BY MOUTH TWICE A DAY 21  Yes Kody Stallworth MD   PSYLLIUM SEED, WITH DEXTROSE, (FIBER PO) Take  by mouth. Yes Provider, Historical   MULTIVITAMIN PO Take  by mouth. Takes one po daily. Yes Provider, Historical   nirmatrelvir-ritonavir (Paxlovid, EUA,) 300 mg (150 mg x 2)-100 mg Per package directions  Indications: CYUDB-02 (emergency use authorization) 22  Elle Arevalo III, MD   oxybutynin (DITROPAN) 5 mg tablet Take 5 mg by mouth nightly as needed for Bladder Spasms. Patient not taking: Reported on 2023  Provider, Historical   cyanocobalamin, vitamin B-12, (VITAMIN B12 PO) Take  by mouth.  23  Provider, Historical   cholecalciferol, VITAMIN D3, (VITAMIN D3) 5,000 unit tab tablet Take  by mouth daily.   Patient not taking: No sig reported  23  Provider, Historical       Social History     Socioeconomic History    Marital status:      Spouse name: Not on file    Number of children: Not on file    Years of education: Not on file    Highest education level: Not on file   Occupational History    Not on file   Tobacco Use    Smoking status: Former     Packs/day: 1.00     Years: 30.00     Pack years: 30.00     Types: Cigarettes     Quit date: 1995     Years since quittin.1    Smokeless tobacco: Never    Tobacco comments:     quit smoking cigarettes 20 yrs ago   Vaping Use    Vaping Use: Never used   Substance and Sexual Activity    Alcohol use: Yes     Alcohol/week: 14.0 standard drinks     Types: 14 Glasses of wine per week    Drug use: No    Sexual activity: Never   Other Topics Concern    Not on file   Social History Narrative    Not on file     Social Determinants of Health     Financial Resource Strain: Low Risk     Difficulty of Paying Living Expenses: Not hard at all   Food Insecurity: No Food Insecurity    Worried About Running Out of Food in the Last Year: Never true    Ran Out of Food in the Last Year: Never true   Transportation Needs: Not on file   Physical Activity: Not on file   Stress: Not on file   Social Connections: Not on file   Intimate Partner Violence: Not on file   Housing Stability: Not on file          ROS  Per HPI    Visit Vitals  /73   Pulse 94   Temp 97.1 °F (36.2 °C) (Temporal)   Resp 20   Ht 6' 2\" (1.88 m)   Wt 278 lb (126.1 kg)   SpO2 97%   BMI 35.69 kg/m²         Physical Exam   Physical Examination: General appearance - alert, well appearing, and in no distress  Chest - clear to auscultation, no wheezes, rales or rhonchi, symmetric air entry  Heart - normal rate and regular rhythm  Abdomen - soft, nontender, nondistended, no masses or organomegaly  Neurological - alert, oriented, normal speech, no focal findings or movement disorder noted  Extremities - peripheral pulses normal, no pedal edema, no clubbing or cyanosis      Assessment/Plan:  Diagnoses and all orders for this visit:    1. Hypogonadism male-was previously told of this diagnosis. He is concerned that his generalized fatigue may be related to this. We will repeat hormone levels at his request and consider treatment only if significantly low. -     METABOLIC PANEL, COMPREHENSIVE; Future  -     CBC WITH AUTOMATED DIFF; Future  -     TESTOSTERONE, TOTAL, ADULT MALE; Future    2. Toe ulcer, left, with unspecified severity (HCC)-resolved. 3. Cerebral atrophy (HCC)-stable.     4. Severe obesity (BMI 35.0-39. 9) with comorbidity (HCC)-continue to work on diet and exercise. 5. Major depressive disorder, recurrent, moderate (HCC)-stable on current meds. 6. Ulcerative rectosigmoiditis with complication (HCC)-stable. 7. Paroxysmal atrial fibrillation (HCC)-stable. 8. Fasting hyperglycemia-blood sugars noted to be elevated in the hospital.  We will check an A1c today.  -     HEMOGLOBIN A1C WITH EAG; Future    9. Bilateral pneumonia with adenopathy noted on CT-we will repeat CT scan in 6 weeks or so. Follow-up and Dispositions    Return in about 6 weeks (around 4/6/2023) for CT Chest.            Advised him to call back or return to office if symptoms worsen/change/persist.  Discussed expected course/resolution/complications of diagnosis in detail with patient. Medication risks/benefits/costs/interactions/alternatives discussed with patient. He was given an after visit summary which includes diagnoses, current medications, & vitals. He expressed understanding with the diagnosis and plan.

## 2023-02-23 NOTE — PROGRESS NOTES
Chief Complaint   Patient presents with    Follow-up    Pneumonia     Blood pressure 130/73, pulse 94, temperature 97.1 °F (36.2 °C), temperature source Temporal, resp. rate 20, height 6' 2\" (1.88 m), weight 278 lb (126.1 kg), SpO2 97 %.

## 2023-02-24 LAB
ALBUMIN SERPL-MCNC: 3.6 G/DL (ref 3.5–5)
ALBUMIN/GLOB SERPL: 1.2 (ref 1.1–2.2)
ALP SERPL-CCNC: 98 U/L (ref 45–117)
ALT SERPL-CCNC: 70 U/L (ref 12–78)
ANION GAP SERPL CALC-SCNC: 6 MMOL/L (ref 5–15)
AST SERPL-CCNC: 39 U/L (ref 15–37)
BASOPHILS # BLD: 0.1 K/UL (ref 0–0.1)
BASOPHILS NFR BLD: 1 % (ref 0–1)
BILIRUB SERPL-MCNC: 0.4 MG/DL (ref 0.2–1)
BUN SERPL-MCNC: 30 MG/DL (ref 6–20)
BUN/CREAT SERPL: 35 (ref 12–20)
CALCIUM SERPL-MCNC: 9.1 MG/DL (ref 8.5–10.1)
CHLORIDE SERPL-SCNC: 110 MMOL/L (ref 97–108)
CO2 SERPL-SCNC: 24 MMOL/L (ref 21–32)
CREAT SERPL-MCNC: 0.85 MG/DL (ref 0.7–1.3)
DIFFERENTIAL METHOD BLD: ABNORMAL
EOSINOPHIL # BLD: 0.1 K/UL (ref 0–0.4)
EOSINOPHIL NFR BLD: 1 % (ref 0–7)
ERYTHROCYTE [DISTWIDTH] IN BLOOD BY AUTOMATED COUNT: 13.2 % (ref 11.5–14.5)
EST. AVERAGE GLUCOSE BLD GHB EST-MCNC: 126 MG/DL
GLOBULIN SER CALC-MCNC: 2.9 G/DL (ref 2–4)
GLUCOSE SERPL-MCNC: 120 MG/DL (ref 65–100)
HBA1C MFR BLD: 6 % (ref 4–5.6)
HCT VFR BLD AUTO: 42.9 % (ref 36.6–50.3)
HGB BLD-MCNC: 14.3 G/DL (ref 12.1–17)
IMM GRANULOCYTES # BLD AUTO: 0.1 K/UL (ref 0–0.04)
IMM GRANULOCYTES NFR BLD AUTO: 1 % (ref 0–0.5)
LYMPHOCYTES # BLD: 1.6 K/UL (ref 0.8–3.5)
LYMPHOCYTES NFR BLD: 16 % (ref 12–49)
MCH RBC QN AUTO: 32.9 PG (ref 26–34)
MCHC RBC AUTO-ENTMCNC: 33.3 G/DL (ref 30–36.5)
MCV RBC AUTO: 98.8 FL (ref 80–99)
MONOCYTES # BLD: 0.7 K/UL (ref 0–1)
MONOCYTES NFR BLD: 7 % (ref 5–13)
NEUTS SEG # BLD: 7.5 K/UL (ref 1.8–8)
NEUTS SEG NFR BLD: 74 % (ref 32–75)
NRBC # BLD: 0 K/UL (ref 0–0.01)
NRBC BLD-RTO: 0 PER 100 WBC
PLATELET # BLD AUTO: 133 K/UL (ref 150–400)
PMV BLD AUTO: 11.1 FL (ref 8.9–12.9)
POTASSIUM SERPL-SCNC: 4.7 MMOL/L (ref 3.5–5.1)
PROT SERPL-MCNC: 6.5 G/DL (ref 6.4–8.2)
RBC # BLD AUTO: 4.34 M/UL (ref 4.1–5.7)
SODIUM SERPL-SCNC: 140 MMOL/L (ref 136–145)
WBC # BLD AUTO: 10 K/UL (ref 4.1–11.1)

## 2023-02-25 LAB — TESTOST SERPL-MCNC: 125 NG/DL (ref 264–916)

## 2023-03-05 RX ORDER — ROSUVASTATIN CALCIUM 10 MG/1
TABLET, COATED ORAL
Qty: 90 TABLET | Refills: 0 | Status: SHIPPED | OUTPATIENT
Start: 2023-03-05

## 2023-03-20 ENCOUNTER — OFFICE VISIT (OUTPATIENT)
Dept: NEUROLOGY | Age: 78
End: 2023-03-20
Payer: MEDICARE

## 2023-03-20 VITALS
DIASTOLIC BLOOD PRESSURE: 88 MMHG | RESPIRATION RATE: 18 BRPM | OXYGEN SATURATION: 95 % | SYSTOLIC BLOOD PRESSURE: 138 MMHG | BODY MASS INDEX: 35.68 KG/M2 | WEIGHT: 278 LBS | HEIGHT: 74 IN | HEART RATE: 96 BPM

## 2023-03-20 DIAGNOSIS — G62.9 POLYNEUROPATHY: ICD-10-CM

## 2023-03-20 DIAGNOSIS — R26.89 BALANCE PROBLEM: ICD-10-CM

## 2023-03-20 DIAGNOSIS — R42 MULTISENSORY DIZZINESS: Primary | ICD-10-CM

## 2023-03-20 PROCEDURE — G9717 DOC PT DX DEP/BP F/U NT REQ: HCPCS

## 2023-03-20 PROCEDURE — 1101F PT FALLS ASSESS-DOCD LE1/YR: CPT

## 2023-03-20 PROCEDURE — G8417 CALC BMI ABV UP PARAM F/U: HCPCS

## 2023-03-20 PROCEDURE — 1123F ACP DISCUSS/DSCN MKR DOCD: CPT

## 2023-03-20 PROCEDURE — G8536 NO DOC ELDER MAL SCRN: HCPCS

## 2023-03-20 PROCEDURE — 3075F SYST BP GE 130 - 139MM HG: CPT

## 2023-03-20 PROCEDURE — 99214 OFFICE O/P EST MOD 30 MIN: CPT

## 2023-03-20 PROCEDURE — 3079F DIAST BP 80-89 MM HG: CPT

## 2023-03-20 PROCEDURE — G8427 DOCREV CUR MEDS BY ELIG CLIN: HCPCS

## 2023-03-20 RX ORDER — OXYBUTYNIN CHLORIDE 5 MG/1
TABLET ORAL
COMMUNITY
Start: 2022-08-10

## 2023-03-20 RX ORDER — GABAPENTIN 100 MG/1
100 CAPSULE ORAL 2 TIMES DAILY
Qty: 180 CAPSULE | Refills: 1 | Status: SHIPPED | OUTPATIENT
Start: 2023-03-20

## 2023-03-20 NOTE — PROGRESS NOTES
Chief Complaint   Patient presents with    Neuropathy     Follow up - Former patient of Dr [employer] -numbness in lower legs and feet -involuntary muscle spasms is taking PT and progress is very slow - Therapist states he has reached a plateau     Dizziness     Causes balance issues      1. Have you been to the ER, urgent care clinic since your last visit? Hospitalized since your last visit? Yes [address] -[place] for pneumonia - 2/2023 for 4 days     2. Have you seen or consulted any other health care providers outside of the [address] [place] [address] since your last visit? Include any pap smears or colon screening.   Yes see above

## 2023-03-20 NOTE — PROGRESS NOTES
Chief Complaint   Patient presents with    Neuropathy     Follow up - Former patient of Dr Kwaku Pfeiffer -numbness in lower legs and feet -involuntary muscle spasms is taking PT and progress is very slow - Therapist states he has reached a plateau     Dizziness     Causes balance issues        HPI    Mr Haily Gustafson is a 69year old gentleman here for follow up. I saw him alongside Dr Nakul Marinelli on 22 for hx of stroke, on Eliquis, and neuropathy confirmed by EMG on 6/3/22. He was BPPV and was completing vestibular PT/OT at last visit. He had a MRI brain done which showed some cerebral atrophy and we recommended neuropsych testing in the future. He is here today reporting that his balance and neuropathy have gotten worse. He tells me that home health said he is going to run out soon and he has plateaued. He would like to continue. His numbness and pain in his BLE have become worse as well. He doesn't take anything for it. Denies falls, uses a rollator. He is on Eliquis without side effects for A-fib. No stroke like s/s. Sleep is ok. EMG Impression:  Extensive electrodiagnostic examination of the right lower extremity and additional nerve conduction studies of the left lower extremity reveals the followin. 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 2. No evidence of a right lumbosacral motor radiculopathy. Background:  Mr. Haily Gustafson is a 78-year-old gentleman with cardiovascular disease, A. fib on Eliquis and other stroke risk factors who was referred to me from cardiology for dizziness. I spoke with he and his wife today personally. He tells me he has had dizziness off and on for several years. He says the dizziness is worse when he is standing upright particular brushing his teeth or shaving. He feels better lying down.   No vision changes or unusual unilateral numbness or weakness. He has had a few spells of sudden loss of consciousness the most recent in January 2022. He also feels like his imbalance is getting worse within the last 12 months. He needs to use a Rollator and cane. He does have spinal stenosis making it difficult to ambulate sometimes. He uses a stationary bike daily. Further questioning reveals a history of suspected ulcerative colitis on sulfasalazine for many years. Ultimately had a colectomy. However it sounds like recent GI consultation suggest he never had ulcerative colitis. He stopped sulfasalazine a couple years ago. He has numbness and tingling in both of his feet and fingers on both sides. He still drinks nightly a large glass of wine every night. Stop smoking in 1985. He is retired crime .  Review of Systems   Musculoskeletal:  Positive for joint pain. Negative for falls. Neurological:  Positive for tingling and weakness. Psychiatric/Behavioral:  Negative for memory loss. The patient does not have insomnia.       Past Medical History:   Diagnosis Date    A-fib [facility])     Allergic rhinitis     Anxiety     Arthritis     Back pain     Cancer ([facility] 2019    Basal cell - face    Chronic pain lower back    plus many other joints    Depression     Fatigue     History of basal cell carcinoma (BCC) of skin 07/01/2019    left side of face    Hypertension     Hypogonadism male     Memory disorder     Muscle weakness     PUD (peptic ulcer disease)     UC (ulcerative colitis) ([facility] 01/20/2010    Ulcerative colitis     Unspecified essential hypertension 01/20/2010    Vertigo      Family History   Problem Relation Age of Onset    Cancer Mother         ovarian,colon    Migraines Sister     Headache Sister     Dementia Sister     Diabetes Sister      Social History     Socioeconomic History    Marital status:      Spouse name: Not on file    Number of children: Not on file    Years of education: Not on file    Highest education level: Not on file   Occupational History    Not on file   Tobacco Use    Smoking status: Former     Packs/day: 1.00     Years: 30.00     Pack years: 30.00     Types: Cigarettes     Quit date: 1995     Years since quittin.2    Smokeless tobacco: Never    Tobacco comments:     quit smoking cigarettes 20 yrs ago   Vaping Use    Vaping Use: Never used   Substance and Sexual Activity    Alcohol use: Yes     Alcohol/week: 14.0 standard drinks     Types: 14 Glasses of wine per week    Drug use: No    Sexual activity: Never   Other Topics Concern    Not on file   Social History Narrative    Not on file     Social Determinants of Health     Financial Resource Strain: Low Risk     Difficulty of Paying Living Expenses: Not hard at all   Food Insecurity: No Food Insecurity    Worried About Running Out of Food in the Last Year: Never true    Ran Out of Food in the Last Year: Never true   Transportation Needs: Not on file   Physical Activity: Not on file   Stress: Not on file   Social Connections: Not on file   Intimate Partner Violence: Not on file   Housing Stability: Not on file     Allergies   Allergen Reactions    Lanolin Itching    No Known Allergies Other (comments)         Current Outpatient Medications   Medication Sig    oxybutynin (DITROPAN) 5 mg tablet     gabapentin (NEURONTIN) 100 mg capsule Take 1 Capsule by mouth two (2) times a day. Max Daily Amount: 200 mg.    rosuvastatin (CRESTOR) 10 mg tablet TAKE ONE TABLET BY MOUTH ONCE NIGHTLY    B.animalis,bifid,infantis,long (Probiotic 4X) 10-15 mg TbEC Take  by mouth.    metoprolol succinate (TOPROL-XL) 25 mg XL tablet Take 50 mg by mouth daily. Myrbetriq 25 mg ER tablet Take 1 Tablet by mouth in the morning. mirtazapine (REMERON) 30 mg tablet Take 1 Tablet by mouth nightly. amLODIPine (NORVASC) 5 mg tablet Take 1.5 Tablets by mouth daily.  Further refills need to come from Dr. Ben Bhakta    traMADoL [facility]) 50 mg tablet Take 50 mg by mouth daily as needed for Pain.    eplerenone (INSPRA) 25 mg tablet Take 25 mg by mouth daily. Eliquis 5 mg tablet TAKE ONE TABLET BY MOUTH TWICE A DAY    PSYLLIUM SEED, WITH DEXTROSE, (FIBER PO) Take  by mouth. MULTIVITAMIN PO Take  by mouth. Takes one po daily. No current facility-administered medications for this visit. Neurologic Exam     Mental Status   Oriented to person, place, and time. Level of consciousness: alert    Cranial Nerves   Cranial nerves II through XII intact. Motor Exam   Muscle bulk: decreased    Strength   Strength 5/5 throughout. Sensory Exam   Right leg light touch: decreased from ankle  Left leg light touch: decreased from ankle  Right leg vibration: decreased from ankle  Left leg vibration: decreased from ankle    Gait, Coordination, and Reflexes Uses a rollator     Visit Vitals  /88 (BP 1 Location: Left upper arm, BP Patient Position: Sitting, BP Cuff Size: Large adult)   Pulse 96   Resp 18   Ht 6' 2\" (1.88 m)   Wt 278 lb (126.1 kg)   SpO2 95%   BMI 35.69 kg/m²         CT Results (maximum last 3): Results from Hospital Encounter encounter on 05/19/22    CTA HEAD    Narrative  CLINICAL HISTORY: Dizziness on standing concerning for vertebrobasilar  insufficiency  INDICATION: Dizziness on standing concerning for vertebrobasilar insufficiency    COMPARISON: None. CONTRAST: 100 ml [id number]    TECHNIQUE: Contrast CT scan of the head was performed: Unenhanced  images  were obtained to localize the volume for acquisition. Multislice helical axial  CT angiography was performed of the head during uneventful rapid bolus  intravenous contrast administration. Coronal and sagittal reformations and 3D  post processing was performed. CT dose reduction was achieved through use of a  standardized protocol tailored for this examination and automatic exposure  control for dose modulation.     FINDINGS:  Confluent periventricular and scattered foci of hypodensity in the cerebral  white matter. Sulcal and ventricular prominence. Sebaceous cysts. Small mucous  retention cysts in the maxillary sinus. Mucoperiosteal thickening of the maxillary sinuses. CTA HEAD  The right vertebral artery is dominant. PICA is patent on the right and on the  left. P1 segments are patent. Proximal P2 segments are patent. M1 segments are  patent. There is a tiny 3x1 mm aneurysm at the bifurcation of the right M1.  3-148. Petrous and cavernous carotid arteries are patent. .A 2 segments are  patent. Symmetric arborization of M2 vessels is demonstrated. The basilar artery  is patent. . The M1 segments are patent bilaterally. .The posterior cerebral  arteries on the right and on the left are patent. .  There are no sizable  posterior communicating arteries. No evidence of acute intracranial hemorrhage or midline shift is demonstrated. 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 There is no evidence of dissection or hemodynamically significant stenosis. .  Moderate chronic perivascular ischemic change and moderate cerebral atrophy. There is no intracranial mass, hemorrhage or evidence of acute infarction. No acute intracranial process is identified. Edmond Villanueva MRI Results (maximum last 3): Results from Hospital Encounter encounter on 09/21/22    MRI IAC W WO CONT    Narrative  EXAM:  MRI IAC W WO CONT    INDICATION:    44-year-old male with bilateral labyrinthine dysfunction. Dx:  Labyrinthine dysfunction, bilateral [P25.3O8 (ICD-10-CM)]; Sensorineural hearing  loss, bilateral [H90.3 (ICD-10-CM)]    COMPARISON:  None. CONTRAST: 20  mL of ProHance. .    TECHNIQUE:  Multiplanar multisequence acquisition of the brain prior to and following IV  contrast administration, including dedicated internal auditory canal protocol. FINDINGS:  Patchy periventricular and deep white matter T2/FLAIR hyperintensity,  nonspecific and likely microangiopathic ischemic changes.  There is no acute  infarct, hemorrhage, extra-axial fluid collection, or mass effect. Marked  diffuse parenchymal volume loss with exvacuodilatation of the ventricles and  extraventricular CSF spaces, nonspecific. There is no cerebellar tonsillar  herniation. Expected arterial flow-voids are present. RIGHT: The cerebellopontine angle and internal auditory canal are free of mass  or enhancing lesion. There are 4 nerves within the internal auditory canal. The  cochlea and other inner structures are grossly normal. Middle ear cavity is free  of fluid. No abnormal diffusion restriction in the petrous apex. External  auditory canal is unremarkable. LEFT: The cerebellopontine angle and internal auditory canal are free of mass or  enhancing lesion. There are 4 nerves within the internal auditory canal. The  cochlea and other inner structures are grossly normal. Middle ear cavity is free  of fluid. No abnormal diffusion restriction in the petrous apex. External  auditory canal is unremarkable. There is marked diffuse mucosal thickening of the paranasal sinuses. Trace left  mastoid effusion. Status post bilateral lense replacement. The orbits are otherwise unremarkable. Left occipitotemporal scalp dermoid inclusion cyst measuring up to 2.4 x 1.4 cm. No suspicious osseous lesion. Impression  1. MRI of bilateral IACs within normal limits bilaterally. 2. Marked diffuse parenchymal volume loss with microangiopathy white matter  disease. Results from [place] encounter on 11/15/17    MRI LUMB SPINE WO CONT    Narrative  EXAM:  MRI LUMB SPINE WO CONT    INDICATION:  Abnormal xray, spine, DJD; spinal stenosis, pain, recent fall. Spinal stenosis, lumbar region without neurogenic claudication    COMPARISON: Radiographs 11/10/2017.  MRI 5/30/2014    TECHNIQUE: MR imaging of the lumbar spine was performed using the following  sequences: sagittal T1, T2, STIR;  axial T1, T2.    CONTRAST: None.    FINDINGS:    There is transitional anatomy at the lumbosacral junction with partial  sacralization of L5 and a rudimentary disc at L5-S1. There is normal alignment  of the lumbar spine. There is no significant loss of height. No evidence of  fracture. There is a 10 mm lesion in L3 with decreased T1 and T2 signal likely  related to sclerosis. There is no increased or stable. This is unchanged since  2014 which likes remains at this is incidental. This could possibly represent a  bone island. Areas of fatty deposition are seen. No significant edema. A small  Schmorl's node is seen along the inferior endplate of L1. The conus medullaris terminates at . Signal and caliber of the distal spinal  cord are within normal limits. The paraspinal soft tissues are within normal limits. Lower thoracic spine: No herniation or stenosis. L1-L2:  Mild disc space narrowing. Minimal broad-based disc bulge without  significant spinal stenosis or neural foraminal narrowing. L2-L3:  Disc desiccation. No herniation or stenosis. L3-L4:  Disc desiccation. Mild broad-based disc bulge with a small area of  annular tearing in the central and left paracentral regions. There is mild  spinal stenosis with mild right neural foraminal narrowing. L4-L5:  Severe disc space narrowing. Minimal central disc osteophyte complex  without significant spinal stenosis or neural foraminal narrowing. L5-S1:  Rudimentary disc. No herniation or stenosis. Impression  IMPRESSION:    1. Incidental transitional anatomy at the lumbosacral junction. 2. Unchanged small sclerotic lesion in L3. Given its stability since 2014, this  is likely incidental and may represent a small bone island. 3. Unchanged mild spondylosis as above.  Unchanged mild spinal stenosis with mild  right neural foraminal narrowing at L3-4      Results from Hospital Encounter encounter on 05/30/14    MRI LUMB SPINE WO CONT    Narrative  **Final Report**      ICD Codes / Adm. Diagnosis: 724.5  722.52 / Backache, unspecified  Degeneration of lumbar or lumb  Examination:  MR L SPINE WO CON  - [id number] - May 30 2014  4:40PM  Accession No:  [id number]  Reason:  Back pain and DJD on plain xrays      REPORT:  INDICATION: Back pain. PROCEDURE: Sagittal and axial and coronal images were obtained through the  spine in various sequences. FINDINGS:  Comparison exam:  None are available. Sagittal images demonstrate normal spinal alignment. There is a transitional  lumbosacral segment. The lower most segment with a diminutive disc caudal to  this segment will be labeled L5 for the purpose of this exam. Severe disc  space narrowing at L4-L5. Spinal canal relatively capacious. Conus has a  normal appearance. Parasagittal images demonstrate minimal to mild foraminal narrowing on the  right at L3-L4. STIR sequences demonstrate no significant bone marrow edema and no  compression deformity. The conus medullaris is in a normal position and has a normal appearance. No bony destructive process is identified. Axial images through T12-L1 demonstrate no HNP or spinal stenosis. Axial images through L1-L2 demonstrate mild disc bulge with a superimposed  mild broad-based left lateral protrusion. Axial images through L2-L3 demonstrate no HNP or spinal stenosis. Minimal to  mild facet arthropathy. Axial images through L3-L4 demonstrate mild disc bulge which in combination  with moderate posterior facet and ligamentous hypertrophic degenerative  change produces minimal central canal narrowing. Axial images through L4-L5 demonstrate a focal central broad-based  protrusion/spondylitic change producing minimal to mild ventral epidural  impression upon the thecal sac. Mild facet arthropathy. Axial images were non-obtained through L5-S1. Sagittal images demonstrate no  significant pathology. Impression  :    Transitional lumbosacral segment.     Mild to moderate multilevel degenerative disc and degenerative joint disease  without significant central spinal stenosis or foraminal stenosis. Sclerotic density in L3 vertebra of uncertain significance clinically. May  represent bone island or degenerative change. However, if there is any  suspicion for metastatic process, nuclear medicine bone imaging study maybe  helpful for further evaluation if indicated clinically. Signing/Reading Doctor: Moose Burt ([id number])  Catrina Burt ([id number])  May 30 2014  5:17PM        Assessment and Plan   Diagnoses and all orders for this visit:    1. Multisensory dizziness  -     REFERRAL TO HOME HEALTH    2. Balance problem  -     REFERRAL TO HOME HEALTH    3. Polyneuropathy  -     gabapentin (NEURONTIN) 100 mg capsule; Take 1 Capsule by mouth two (2) times a day. Max Daily Amount: 200 mg.      69year old gentleman with polyneuropathy that is getting worse and balance problems. He would like to continue with PT if he could. I will place another order. I want to try him on low dose gabapentin BID. This will hopefully help the pain and thus help him get moving more in PT. Side effects discussed. This might help with sleep as well. Continue Eliquis for a-fib and stroke prevention. Stroke education provided. We talked about his brain MRI. Denies any memory problems. Will defer neuropsych testing for now. I will see him back for follow up in 6 months. I spent 35 minutes of time today reviewing the medical record and notes, imaging, examining the patient, and face-to-face time, patient/family teaching and medication side effects, and time spent completing documentation.       LUISANA Blnakenship

## 2023-03-30 ENCOUNTER — TELEPHONE (OUTPATIENT)
Dept: INTERNAL MEDICINE CLINIC | Age: 78
End: 2023-03-30

## 2023-03-30 DIAGNOSIS — J18.9 PNEUMONIA DUE TO INFECTIOUS ORGANISM, UNSPECIFIED LATERALITY, UNSPECIFIED PART OF LUNG: Primary | ICD-10-CM

## 2023-03-30 DIAGNOSIS — R59.0 LOCALIZED ENLARGED LYMPH NODES: ICD-10-CM

## 2023-03-30 NOTE — TELEPHONE ENCOUNTER
----- Message from Anselmo Kinney sent at 3/30/2023 12:03 PM EDT -----  Subject: Referral Request    Reason for referral request? Dr. Darrius Trejo told patient to call back in April   to request to have another CT scan completed. Please call patient when   referral is complete. Provider patient wants to be referred to(if known):   Provider Phone Number(if known):   Additional Information for Provider?   ---------------------------------------------------------------------------  --------------  [address]    [phone]; OK to leave message on voicemail  ---------------------------------------------------------------------------  --------------

## 2023-04-07 ENCOUNTER — TELEPHONE (OUTPATIENT)
Dept: INTERNAL MEDICINE CLINIC | Age: 78
End: 2023-04-07

## 2023-05-31 RX ORDER — ROSUVASTATIN CALCIUM 10 MG/1
TABLET, COATED ORAL
Qty: 90 TABLET | Refills: 0 | Status: SHIPPED | OUTPATIENT
Start: 2023-05-31

## 2023-06-28 RX ORDER — MIRTAZAPINE 30 MG/1
TABLET, FILM COATED ORAL
Qty: 90 TABLET | Refills: 0 | Status: SHIPPED | OUTPATIENT
Start: 2023-06-28

## 2023-08-22 RX ORDER — ROSUVASTATIN CALCIUM 10 MG/1
TABLET, COATED ORAL
Qty: 90 TABLET | Refills: 0 | Status: SHIPPED | OUTPATIENT
Start: 2023-08-22

## 2023-09-11 DIAGNOSIS — R26.89 OTHER ABNORMALITIES OF GAIT AND MOBILITY: ICD-10-CM

## 2023-09-11 DIAGNOSIS — G62.9 POLYNEUROPATHY, UNSPECIFIED: Primary | ICD-10-CM

## 2023-09-11 RX ORDER — GABAPENTIN 100 MG/1
100 CAPSULE ORAL 2 TIMES DAILY
Qty: 60 CAPSULE | Refills: 5 | Status: SHIPPED | OUTPATIENT
Start: 2023-09-11 | End: 2024-03-09

## 2023-09-15 ENCOUNTER — TELEPHONE (OUTPATIENT)
Age: 78
End: 2023-09-15

## 2023-09-15 NOTE — TELEPHONE ENCOUNTER
Called patient's wife. She would like us to fax over the EMG results to PCP. Stated that the PCP is within the [facility] system and should be able to see it in the system.

## 2023-09-18 SDOH — ECONOMIC STABILITY: FOOD INSECURITY: WITHIN THE PAST 12 MONTHS, THE FOOD YOU BOUGHT JUST DIDN'T LAST AND YOU DIDN'T HAVE MONEY TO GET MORE.: NEVER TRUE

## 2023-09-18 SDOH — ECONOMIC STABILITY: INCOME INSECURITY: HOW HARD IS IT FOR YOU TO PAY FOR THE VERY BASICS LIKE FOOD, HOUSING, MEDICAL CARE, AND HEATING?: NOT HARD AT ALL

## 2023-09-18 SDOH — ECONOMIC STABILITY: TRANSPORTATION INSECURITY
IN THE PAST 12 MONTHS, HAS LACK OF TRANSPORTATION KEPT YOU FROM MEETINGS, WORK, OR FROM GETTING THINGS NEEDED FOR DAILY LIVING?: NO

## 2023-09-18 SDOH — ECONOMIC STABILITY: HOUSING INSECURITY
IN THE LAST 12 MONTHS, WAS THERE A TIME WHEN YOU DID NOT HAVE A STEADY PLACE TO SLEEP OR SLEPT IN A SHELTER (INCLUDING NOW)?: NO

## 2023-09-18 SDOH — ECONOMIC STABILITY: FOOD INSECURITY: WITHIN THE PAST 12 MONTHS, YOU WORRIED THAT YOUR FOOD WOULD RUN OUT BEFORE YOU GOT MONEY TO BUY MORE.: NEVER TRUE

## 2023-09-19 ENCOUNTER — OFFICE VISIT (OUTPATIENT)
Age: 78
End: 2023-09-19
Payer: MEDICARE

## 2023-09-19 VITALS
OXYGEN SATURATION: 95 % | WEIGHT: 288 LBS | HEART RATE: 103 BPM | TEMPERATURE: 97.6 F | BODY MASS INDEX: 36.96 KG/M2 | SYSTOLIC BLOOD PRESSURE: 153 MMHG | HEIGHT: 74 IN | DIASTOLIC BLOOD PRESSURE: 83 MMHG | RESPIRATION RATE: 15 BRPM

## 2023-09-19 DIAGNOSIS — R73.01 IMPAIRED FASTING GLUCOSE: ICD-10-CM

## 2023-09-19 DIAGNOSIS — I10 ESSENTIAL (PRIMARY) HYPERTENSION: ICD-10-CM

## 2023-09-19 DIAGNOSIS — G31.9 DEGENERATIVE DISEASE OF NERVOUS SYSTEM, UNSPECIFIED (HCC): ICD-10-CM

## 2023-09-19 DIAGNOSIS — F41.1 GENERALIZED ANXIETY DISORDER: ICD-10-CM

## 2023-09-19 DIAGNOSIS — Z23 ENCOUNTER FOR IMMUNIZATION: ICD-10-CM

## 2023-09-19 DIAGNOSIS — I48.0 PAROXYSMAL ATRIAL FIBRILLATION (HCC): Primary | ICD-10-CM

## 2023-09-19 DIAGNOSIS — K51.919 ULCERATIVE COLITIS WITH COMPLICATION, UNSPECIFIED LOCATION (HCC): ICD-10-CM

## 2023-09-19 DIAGNOSIS — D69.6 THROMBOCYTOPENIA, UNSPECIFIED (HCC): ICD-10-CM

## 2023-09-19 DIAGNOSIS — K51.319: ICD-10-CM

## 2023-09-19 DIAGNOSIS — M47.26 OTHER SPONDYLOSIS WITH RADICULOPATHY, LUMBAR REGION: ICD-10-CM

## 2023-09-19 PROCEDURE — 3079F DIAST BP 80-89 MM HG: CPT | Performed by: INTERNAL MEDICINE

## 2023-09-19 PROCEDURE — 1036F TOBACCO NON-USER: CPT | Performed by: INTERNAL MEDICINE

## 2023-09-19 PROCEDURE — 99214 OFFICE O/P EST MOD 30 MIN: CPT | Performed by: INTERNAL MEDICINE

## 2023-09-19 PROCEDURE — 1123F ACP DISCUSS/DSCN MKR DOCD: CPT | Performed by: INTERNAL MEDICINE

## 2023-09-19 PROCEDURE — PBSHW INFLUENZA, FLUAD, (AGE 65 Y+), IM, PF, 0.5 ML: Performed by: INTERNAL MEDICINE

## 2023-09-19 PROCEDURE — G8427 DOCREV CUR MEDS BY ELIG CLIN: HCPCS | Performed by: INTERNAL MEDICINE

## 2023-09-19 PROCEDURE — G0008 ADMIN INFLUENZA VIRUS VAC: HCPCS | Performed by: INTERNAL MEDICINE

## 2023-09-19 PROCEDURE — 3077F SYST BP >= 140 MM HG: CPT | Performed by: INTERNAL MEDICINE

## 2023-09-19 PROCEDURE — G8417 CALC BMI ABV UP PARAM F/U: HCPCS | Performed by: INTERNAL MEDICINE

## 2023-09-19 PROCEDURE — 90694 VACC AIIV4 NO PRSRV 0.5ML IM: CPT | Performed by: INTERNAL MEDICINE

## 2023-09-19 RX ORDER — GABAPENTIN 100 MG/1
CAPSULE ORAL
Qty: 180 CAPSULE | OUTPATIENT
Start: 2023-09-19

## 2023-09-19 RX ORDER — PREGABALIN 50 MG/1
50 CAPSULE ORAL 2 TIMES DAILY
Qty: 60 CAPSULE | Refills: 0 | Status: SHIPPED | OUTPATIENT
Start: 2023-09-19 | End: 2023-10-19

## 2023-09-19 NOTE — PROGRESS NOTES
HPI:  Filippo Gamino is a 69y.o. year old male who is here for a follow-up visit. He had an evaluation for neuropathy last year. He wanted to go over his EMG results. These did show significant issues with neuropathic issues. He has ongoing issues with pain and burning in his feet at night. Has ongoing issues with instability in his legs. His wife also had questions about his previous brain MRI that revealed small vessel changes and atrophy. No recent falls. No bleeding issues. He is up-to-date on visits with cardiology. He does find that gabapentin makes him sleepy. Past Medical History:   Diagnosis Date    A-fib [facility])     Allergic rhinitis     Anticoagulant long-term use     Anxiety     Arthritis     Atrial fibrillation (HCC)     Back pain     CAD (coronary artery disease)     Cancer ([address]) 2019    Basal cell - face    Chronic back pain     Chronic pain lower back    plus many other joints    COPD (chronic obstructive pulmonary disease) ([address])     Depression     Fatigue     History of basal cell carcinoma (BCC) of skin 07/01/2019    left side of face    Hypertension     Hypogonadism male     Memory disorder     Muscle weakness     Neuropathy     Obesity     PUD (peptic ulcer disease)     Sleep apnea     UC (ulcerative colitis) ([address]) 01/20/2010    Ulcerative colitis ([address])     Unspecified essential hypertension 01/20/2010    Urinary incontinence     Vertigo        Past Surgical History:   Procedure Laterality Date    COLONOSCOPY  02/07/2017    3 yr f/u - Dr. Tevin Cardenas Left 07/01/2019    basal cell carcinoma left side of face. SMALL INTESTINE SURGERY      SUBTOTAL COLECTOMY      TONSILLECTOMY      TOTAL COLECTOMY  06/24/2020    partial colectomy    WISDOM TOOTH EXTRACTION         Prior to Admission medications    Medication Sig Start Date End Date Taking? Authorizing Provider   pregabalin (LYRICA) 50 MG capsule Take 1 capsule by mouth 2 times daily for 30 days.  Max

## 2023-09-24 RX ORDER — MIRTAZAPINE 30 MG/1
TABLET, FILM COATED ORAL
Qty: 90 TABLET | Refills: 0 | Status: SHIPPED | OUTPATIENT
Start: 2023-09-24

## 2023-10-02 DIAGNOSIS — M47.26 OTHER SPONDYLOSIS WITH RADICULOPATHY, LUMBAR REGION: ICD-10-CM

## 2023-10-02 RX ORDER — MIRTAZAPINE 30 MG/1
TABLET, FILM COATED ORAL
Qty: 90 TABLET | Refills: 1 | Status: SHIPPED | OUTPATIENT
Start: 2023-10-02

## 2023-10-02 RX ORDER — PREGABALIN 50 MG/1
CAPSULE ORAL
Qty: 60 CAPSULE | Refills: 1 | Status: SHIPPED | OUTPATIENT
Start: 2023-10-02 | End: 2023-12-01

## 2023-10-27 ENCOUNTER — TELEPHONE (OUTPATIENT)
Age: 78
End: 2023-10-27

## 2023-10-27 ENCOUNTER — TELEMEDICINE (OUTPATIENT)
Age: 78
End: 2023-10-27

## 2023-10-27 ENCOUNTER — E-VISIT (OUTPATIENT)
Age: 78
End: 2023-10-27
Payer: MEDICARE

## 2023-10-27 DIAGNOSIS — U07.1 COVID-19: Primary | ICD-10-CM

## 2023-10-27 DIAGNOSIS — I10 ESSENTIAL (PRIMARY) HYPERTENSION: Primary | ICD-10-CM

## 2023-10-27 PROCEDURE — 99421 OL DIG E/M SVC 5-10 MIN: CPT | Performed by: INTERNAL MEDICINE

## 2023-10-27 SDOH — ECONOMIC STABILITY: FOOD INSECURITY: WITHIN THE PAST 12 MONTHS, YOU WORRIED THAT YOUR FOOD WOULD RUN OUT BEFORE YOU GOT MONEY TO BUY MORE.: NEVER TRUE

## 2023-10-27 SDOH — ECONOMIC STABILITY: INCOME INSECURITY: HOW HARD IS IT FOR YOU TO PAY FOR THE VERY BASICS LIKE FOOD, HOUSING, MEDICAL CARE, AND HEATING?: NOT HARD AT ALL

## 2023-10-27 SDOH — ECONOMIC STABILITY: FOOD INSECURITY: WITHIN THE PAST 12 MONTHS, THE FOOD YOU BOUGHT JUST DIDN'T LAST AND YOU DIDN'T HAVE MONEY TO GET MORE.: NEVER TRUE

## 2023-10-27 ASSESSMENT — LIFESTYLE VARIABLES
SMOKING_YEARS: 25
SMOKING_STATUS: NO, BUT I USED TO SMOKE
PACKS_PER_DAY: 1

## 2023-10-27 ASSESSMENT — PATIENT HEALTH QUESTIONNAIRE - PHQ9
SUM OF ALL RESPONSES TO PHQ QUESTIONS 1-9: 0
SUM OF ALL RESPONSES TO PHQ QUESTIONS 1-9: 0
2. FEELING DOWN, DEPRESSED OR HOPELESS: 0
SUM OF ALL RESPONSES TO PHQ9 QUESTIONS 1 & 2: 0
SUM OF ALL RESPONSES TO PHQ QUESTIONS 1-9: 0
1. LITTLE INTEREST OR PLEASURE IN DOING THINGS: 0
SUM OF ALL RESPONSES TO PHQ QUESTIONS 1-9: 0

## 2023-10-27 ASSESSMENT — ENCOUNTER SYMPTOMS
ABDOMINAL PAIN: 0
DIARRHEA: 0
COUGH: 0
CONSTIPATION: 0
SHORTNESS OF BREATH: 0
VOMITING: 0
BLOOD IN STOOL: 0
NAUSEA: 0

## 2023-10-27 NOTE — TELEPHONE ENCOUNTER
Patient will have his wife call back when she returns from the pharmacy for instructions for submitting an e-visit.

## 2023-10-27 NOTE — PROGRESS NOTES
Laura Escobedo is a 69 y.o. male who was seen by synchronous (real-time) audio-video technology on 10/27/2023. Laura Escobedo, was evaluated through a synchronous (real-time) audio-video encounter. The patient (or guardian if applicable) is aware that this is a billable service, which includes applicable co-pays. This Virtual Visit was conducted with patient's (and/or legal guardian's) consent. Patient identification was verified, and a caregiver was present when appropriate. The patient was located at Home: [address] [place] [address]  Provider was located at [facility] (Appt Dept): [address] [facility],  [address]        Subjective:   Laura Escobedo was seen for No chief complaint on file. Past Medical History:   Diagnosis Date    A-fib [facility])     Allergic rhinitis     Anticoagulant long-term use     Anxiety     Arthritis     Atrial fibrillation (HCC)     Back pain     CAD (coronary artery disease)     Cancer ([address]) 2019    Basal cell - face    Chronic back pain     Chronic pain lower back    plus many other joints    COPD (chronic obstructive pulmonary disease) ([address])     Depression     Fatigue     History of basal cell carcinoma (BCC) of skin 07/01/2019    left side of face    Hypertension     Hypogonadism male     Memory disorder     Muscle weakness     Neuropathy     Obesity     PUD (peptic ulcer disease)     Sleep apnea     UC (ulcerative colitis) ([address]) 01/20/2010    Ulcerative colitis ([address])     Unspecified essential hypertension 01/20/2010    Urinary incontinence     Vertigo        Prior to Admission medications    Medication Sig Start Date End Date Taking?  Authorizing Provider   pregabalin (LYRICA) 50 MG capsule TAKE 1 CAPSULE BY MOUTH TWICE A DAY **MAX DAILY AMOUNT: 2 CAPSULES** 10/2/23 12/1/23  Dominga Lopez MD   mirtazapine (REMERON) 30 MG tablet TAKE ONE TABLET BY MOUTH ONCE NIGHTLY 10/2/23   Dominga Lopez MD   gabapentin (NEURONTIN) 100 MG capsule

## 2023-10-27 NOTE — PROGRESS NOTES
E-Visit Note:    HPI: per patient questionnaire, this has been reviewed  EXAM: n/a    ----------------------------------  1. COVID-19     Medications, tylenol, cough meds and call if not improving.   ----------------------------------  The patient was advised to call if these symptoms worsen or fail to improve as anticipated. 5-10 minutes were spent on the digital evaluation and management of this patient.      Nathalie Connor MD

## 2023-10-27 NOTE — TELEPHONE ENCOUNTER
Reason for call:  TC from [facility]. Ms. Eleuterio Downey on [address] [place] [address]. Pt id verified. Ms. Eleuterio Downey stated VV did not work out and Dr. Elgin Tang told pt to go to ER but pt does not feel like going and pt is not going to call an ER. Ms. Eleuterio Downey stated if the pt could get Paxlovid he may feel better enough, tomorrow, to go to ER. Ms. Eleuterio Downey would like to talk to nurse to discuss.       Is this a new problem: No    Date of last appointment:  10/27/2023     Can we respond via [employer]: No    Best call back number: [phone]

## 2023-11-02 ENCOUNTER — OFFICE VISIT (OUTPATIENT)
Age: 78
End: 2023-11-02
Payer: MEDICARE

## 2023-11-02 VITALS
BODY MASS INDEX: 36.06 KG/M2 | DIASTOLIC BLOOD PRESSURE: 70 MMHG | SYSTOLIC BLOOD PRESSURE: 114 MMHG | RESPIRATION RATE: 15 BRPM | HEIGHT: 74 IN | TEMPERATURE: 97.8 F | WEIGHT: 281 LBS | HEART RATE: 87 BPM | OXYGEN SATURATION: 96 %

## 2023-11-02 DIAGNOSIS — I48.0 PAROXYSMAL ATRIAL FIBRILLATION (HCC): ICD-10-CM

## 2023-11-02 DIAGNOSIS — I10 ESSENTIAL (PRIMARY) HYPERTENSION: ICD-10-CM

## 2023-11-02 DIAGNOSIS — R31.9 HEMATURIA, UNSPECIFIED TYPE: Primary | ICD-10-CM

## 2023-11-02 DIAGNOSIS — R31.9 HEMATURIA, UNSPECIFIED TYPE: ICD-10-CM

## 2023-11-02 DIAGNOSIS — U07.1 COVID-19: ICD-10-CM

## 2023-11-02 PROCEDURE — G8484 FLU IMMUNIZE NO ADMIN: HCPCS | Performed by: INTERNAL MEDICINE

## 2023-11-02 PROCEDURE — 3074F SYST BP LT 130 MM HG: CPT | Performed by: INTERNAL MEDICINE

## 2023-11-02 PROCEDURE — 99213 OFFICE O/P EST LOW 20 MIN: CPT | Performed by: INTERNAL MEDICINE

## 2023-11-02 PROCEDURE — 1036F TOBACCO NON-USER: CPT | Performed by: INTERNAL MEDICINE

## 2023-11-02 PROCEDURE — 1123F ACP DISCUSS/DSCN MKR DOCD: CPT | Performed by: INTERNAL MEDICINE

## 2023-11-02 PROCEDURE — G8427 DOCREV CUR MEDS BY ELIG CLIN: HCPCS | Performed by: INTERNAL MEDICINE

## 2023-11-02 PROCEDURE — G8417 CALC BMI ABV UP PARAM F/U: HCPCS | Performed by: INTERNAL MEDICINE

## 2023-11-02 PROCEDURE — 3078F DIAST BP <80 MM HG: CPT | Performed by: INTERNAL MEDICINE

## 2023-11-02 RX ORDER — OXYBUTYNIN CHLORIDE 5 MG/1
TABLET ORAL
COMMUNITY
Start: 2023-09-19

## 2023-11-02 RX ORDER — ASPIRIN 81 MG/1
81 TABLET ORAL DAILY
COMMUNITY

## 2023-11-02 NOTE — PROGRESS NOTES
HPI:  Rosangela Trivedi is a 69y.o. year old male who is here for gross hematuria for the last 2 weeks or so. It started while he was on vacation in [facility]. He had some initially pink urine and now it has somewhat increased. He denies any clots. Some increased urinary frequency. No dysuria. No fevers or chills. No nausea or vomiting. He also recently had COVID and is getting over that. His cough and congestion has improved. No fevers or chills. Past Medical History:   Diagnosis Date    A-fib [facility])     Allergic rhinitis     Anticoagulant long-term use     Anxiety     Arthritis     Atrial fibrillation (HCC)     Back pain     CAD (coronary artery disease)     Cancer ([address]) 2019    Basal cell - face    Chronic back pain     Chronic pain lower back    plus many other joints    COPD (chronic obstructive pulmonary disease) ([address])     Depression     Fatigue     History of basal cell carcinoma (BCC) of skin 07/01/2019    left side of face    Hypertension     Hypogonadism male     Memory disorder     Muscle weakness     Neuropathy     Obesity     PUD (peptic ulcer disease)     Sleep apnea     UC (ulcerative colitis) ([address]) 01/20/2010    Ulcerative colitis ([address])     Unspecified essential hypertension 01/20/2010    Urinary incontinence     Vertigo        Past Surgical History:   Procedure Laterality Date    COLONOSCOPY  02/07/2017    3 yr f/u - Dr. Keegan Love Left 07/01/2019    basal cell carcinoma left side of face. SMALL INTESTINE SURGERY      SUBTOTAL COLECTOMY      TONSILLECTOMY      TOTAL COLECTOMY  06/24/2020    partial colectomy    WISDOM TOOTH EXTRACTION         Prior to Admission medications    Medication Sig Start Date End Date Taking?  Authorizing Provider   aspirin 81 MG EC tablet Take 1 tablet by mouth daily   Yes Provider, Historical, MD   oxybutynin (DITROPAN) 5 MG tablet  9/19/23  Yes Provider, Historical, MD   pregabalin (LYRICA) 50 MG capsule TAKE 1 CAPSULE BY MOUTH

## 2023-11-03 LAB
APPEARANCE UR: ABNORMAL
BACTERIA URNS QL MICRO: NEGATIVE /HPF
BILIRUB UR QL: NEGATIVE
COLOR UR: ABNORMAL
EPITH CASTS URNS QL MICRO: ABNORMAL /LPF
GLUCOSE UR STRIP.AUTO-MCNC: NEGATIVE MG/DL
HGB UR QL STRIP: ABNORMAL
KETONES UR QL STRIP.AUTO: NEGATIVE MG/DL
LEUKOCYTE ESTERASE UR QL STRIP.AUTO: NEGATIVE
NITRITE UR QL STRIP.AUTO: NEGATIVE
PH UR STRIP: 5.5 (ref 5–8)
PROT UR STRIP-MCNC: 30 MG/DL
RBC #/AREA URNS HPF: >100 /HPF (ref 0–5)
SP GR UR REFRACTOMETRY: 1.02 (ref 1–1.03)
UROBILINOGEN UR QL STRIP.AUTO: 0.2 EU/DL (ref 0.2–1)
WBC URNS QL MICRO: ABNORMAL /HPF (ref 0–4)

## 2023-11-04 LAB
BACTERIA SPEC CULT: NORMAL
CC UR VC: NORMAL
SERVICE CMNT-IMP: NORMAL

## 2023-11-06 DIAGNOSIS — R31.9 HEMATURIA, UNSPECIFIED TYPE: Primary | ICD-10-CM

## 2023-11-09 LAB — LEFT VENTRICULAR EJECTION FRACTION, EXTERNAL: NORMAL

## 2023-11-20 RX ORDER — ROSUVASTATIN CALCIUM 10 MG/1
TABLET, COATED ORAL
Qty: 90 TABLET | Refills: 0 | Status: SHIPPED | OUTPATIENT
Start: 2023-11-20

## 2023-11-27 ENCOUNTER — TELEPHONE (OUTPATIENT)
Age: 78
End: 2023-11-27

## 2024-01-01 NOTE — TELEPHONE ENCOUNTER
hernan askew () [phone] (cell)  Patient's wife called about scheduling the MRI for her , per Dr. Shreya Waters' email. She asked that you first try her on the home number, but if does not answer, please call her cell#. She asked that you do not put the cell number in the chart.
Statement Selected

## 2024-01-04 ENCOUNTER — OFFICE VISIT (OUTPATIENT)
Age: 79
End: 2024-01-04
Payer: MEDICARE

## 2024-01-04 VITALS
TEMPERATURE: 98 F | SYSTOLIC BLOOD PRESSURE: 124 MMHG | WEIGHT: 275 LBS | HEIGHT: 74 IN | DIASTOLIC BLOOD PRESSURE: 76 MMHG | RESPIRATION RATE: 15 BRPM | HEART RATE: 99 BPM | OXYGEN SATURATION: 95 % | BODY MASS INDEX: 35.29 KG/M2

## 2024-01-04 DIAGNOSIS — I48.0 PAROXYSMAL ATRIAL FIBRILLATION (HCC): ICD-10-CM

## 2024-01-04 DIAGNOSIS — M48.061 SPINAL STENOSIS OF LUMBAR REGION, UNSPECIFIED WHETHER NEUROGENIC CLAUDICATION PRESENT: ICD-10-CM

## 2024-01-04 DIAGNOSIS — E66.01 SEVERE OBESITY (BMI 35.0-39.9) WITH COMORBIDITY (HCC): ICD-10-CM

## 2024-01-04 DIAGNOSIS — Z00.00 MEDICARE ANNUAL WELLNESS VISIT, SUBSEQUENT: Primary | ICD-10-CM

## 2024-01-04 DIAGNOSIS — D69.6 THROMBOCYTOPENIA, UNSPECIFIED (HCC): ICD-10-CM

## 2024-01-04 DIAGNOSIS — G31.9 DEGENERATIVE DISEASE OF NERVOUS SYSTEM, UNSPECIFIED (HCC): ICD-10-CM

## 2024-01-04 DIAGNOSIS — C67.9 MALIGNANT NEOPLASM OF URINARY BLADDER, UNSPECIFIED SITE (HCC): ICD-10-CM

## 2024-01-04 DIAGNOSIS — I10 ESSENTIAL (PRIMARY) HYPERTENSION: ICD-10-CM

## 2024-01-04 DIAGNOSIS — F33.1 MAJOR DEPRESSIVE DISORDER, RECURRENT, MODERATE (HCC): ICD-10-CM

## 2024-01-04 DIAGNOSIS — K51.319: ICD-10-CM

## 2024-01-04 DIAGNOSIS — L97.529 NON-PRESSURE CHRONIC ULCER OF OTHER PART OF LEFT FOOT WITH UNSPECIFIED SEVERITY (HCC): ICD-10-CM

## 2024-01-04 PROCEDURE — 3074F SYST BP LT 130 MM HG: CPT | Performed by: INTERNAL MEDICINE

## 2024-01-04 PROCEDURE — 1123F ACP DISCUSS/DSCN MKR DOCD: CPT | Performed by: INTERNAL MEDICINE

## 2024-01-04 PROCEDURE — 1036F TOBACCO NON-USER: CPT | Performed by: INTERNAL MEDICINE

## 2024-01-04 PROCEDURE — G8417 CALC BMI ABV UP PARAM F/U: HCPCS | Performed by: INTERNAL MEDICINE

## 2024-01-04 PROCEDURE — 99214 OFFICE O/P EST MOD 30 MIN: CPT | Performed by: INTERNAL MEDICINE

## 2024-01-04 PROCEDURE — 3078F DIAST BP <80 MM HG: CPT | Performed by: INTERNAL MEDICINE

## 2024-01-04 PROCEDURE — G0439 PPPS, SUBSEQ VISIT: HCPCS | Performed by: INTERNAL MEDICINE

## 2024-01-04 PROCEDURE — G8484 FLU IMMUNIZE NO ADMIN: HCPCS | Performed by: INTERNAL MEDICINE

## 2024-01-04 PROCEDURE — G8427 DOCREV CUR MEDS BY ELIG CLIN: HCPCS | Performed by: INTERNAL MEDICINE

## 2024-01-04 ASSESSMENT — PATIENT HEALTH QUESTIONNAIRE - PHQ9
1. LITTLE INTEREST OR PLEASURE IN DOING THINGS: 2
2. FEELING DOWN, DEPRESSED OR HOPELESS: 3
SUM OF ALL RESPONSES TO PHQ9 QUESTIONS 1 & 2: 5
SUM OF ALL RESPONSES TO PHQ QUESTIONS 1-9: 13
SUM OF ALL RESPONSES TO PHQ QUESTIONS 1-9: 13
SUM OF ALL RESPONSES TO PHQ9 QUESTIONS 1 & 2: 5
SUM OF ALL RESPONSES TO PHQ QUESTIONS 1-9: 13
3. TROUBLE FALLING OR STAYING ASLEEP: 3
SUM OF ALL RESPONSES TO PHQ QUESTIONS 1-9: 5
7. TROUBLE CONCENTRATING ON THINGS, SUCH AS READING THE NEWSPAPER OR WATCHING TELEVISION: 2
SUM OF ALL RESPONSES TO PHQ QUESTIONS 1-9: 5
2. FEELING DOWN, DEPRESSED OR HOPELESS: 3
5. POOR APPETITE OR OVEREATING: 0
SUM OF ALL RESPONSES TO PHQ QUESTIONS 1-9: 5
1. LITTLE INTEREST OR PLEASURE IN DOING THINGS: 2
SUM OF ALL RESPONSES TO PHQ QUESTIONS 1-9: 13
4. FEELING TIRED OR HAVING LITTLE ENERGY: 3
SUM OF ALL RESPONSES TO PHQ QUESTIONS 1-9: 5
6. FEELING BAD ABOUT YOURSELF - OR THAT YOU ARE A FAILURE OR HAVE LET YOURSELF OR YOUR FAMILY DOWN: 0
8. MOVING OR SPEAKING SO SLOWLY THAT OTHER PEOPLE COULD HAVE NOTICED. OR THE OPPOSITE, BEING SO FIGETY OR RESTLESS THAT YOU HAVE BEEN MOVING AROUND A LOT MORE THAN USUAL: 0
9. THOUGHTS THAT YOU WOULD BE BETTER OFF DEAD, OR OF HURTING YOURSELF: 0
10. IF YOU CHECKED OFF ANY PROBLEMS, HOW DIFFICULT HAVE THESE PROBLEMS MADE IT FOR YOU TO DO YOUR WORK, TAKE CARE OF THINGS AT HOME, OR GET ALONG WITH OTHER PEOPLE: 2

## 2024-01-04 ASSESSMENT — LIFESTYLE VARIABLES
HOW MANY STANDARD DRINKS CONTAINING ALCOHOL DO YOU HAVE ON A TYPICAL DAY: 3 OR 4
HOW OFTEN DO YOU HAVE A DRINK CONTAINING ALCOHOL: 4 OR MORE TIMES A WEEK

## 2024-01-04 NOTE — PATIENT INSTRUCTIONS
think you are having a problem with your medicine.     If your doctor recommends aspirin, take the amount directed each day. Make sure you take aspirin and not another kind of pain reliever, such as acetaminophen (Tylenol).   When should you call for help?   Call 911 if you have symptoms of a heart attack. These may include:    Chest pain or pressure, or a strange feeling in the chest.     Sweating.     Shortness of breath.     Pain, pressure, or a strange feeling in the back, neck, jaw, or upper belly or in one or both shoulders or arms.     Lightheadedness or sudden weakness.     A fast or irregular heartbeat.   After you call 911, the  may tell you to chew 1 adult-strength or 2 to 4 low-dose aspirin. Wait for an ambulance. Do not try to drive yourself.  Watch closely for changes in your health, and be sure to contact your doctor if you have any problems.  Where can you learn more?  Go to https://www.[employer].net/patientEd and enter F075 to learn more about \"A Healthy Heart: Care Instructions.\"  Current as of: June 25, 2023               Content Version: 13.9  © 9726-0414 [employer].   Care instructions adapted under license by [employer]. If you have questions about a medical condition or this instruction, always ask your healthcare professional. [employer] disclaims any warranty or liability for your use of this information.      Personalized Preventive Plan for Simba Quintero - 1/4/2024  Medicare offers a range of preventive health benefits. Some of the tests and screenings are paid in full while other may be subject to a deductible, co-insurance, and/or copay.    Some of these benefits include a comprehensive review of your medical history including lifestyle, illnesses that may run in your family, and various assessments and screenings as appropriate.    After reviewing your medical record and screening and assessments performed today your provider may have ordered

## 2024-01-04 NOTE — PROGRESS NOTES
Medicare Annual Wellness Visit    Simba Quintero is here for Medicare AWV    Assessment & Plan   Medicare annual wellness visit, subsequent  Paroxysmal atrial fibrillation (HCC)-stable and followed by cardiology.  Essential (primary) hypertension-blood pressure well-controlled on current meds.  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  Malignant neoplasm of urinary bladder, unspecified site (HCC)-scheduled for surgery next week.  We did discuss his ongoing issues with incontinence and urinary frequency.  Would consider different types of catheters if symptoms worsen.  Spinal stenosis of lumbar region, unspecified whether neurogenic claudication present-stable.  Current meds are controlling pain.  Severe obesity (BMI 35.0-39.9) with comorbidity (HCC)-work on diet and exercise for weight loss.  Non-pressure chronic ulcer of other part of left foot with unspecified severity (HCC)-resolved.  Degenerative disease of nervous system, unspecified (HCC)-stable.  Major depressive disorder, recurrent, moderate (HCC)-stable on Cymbalta.  Thrombocytopenia, unspecified (HCC)-stable on recent labs and will get repeat labs next week for preop.  Recommendations for Preventive Services Due: see orders and patient instructions/AVS.  Recommended screening schedule for the next 5-10 years is provided to the patient in written form: see Patient Instructions/AVS.     Return in 6 months (on 7/4/2024).     Subjective   Presents for a wellness exam as well as a follow-up.  Has overall been doing reasonably well.  He does have issues with his bladder with incontinence and urinary frequency.  He has recently been found to have bladder cancer and is scheduled for surgery next week.  No recurrent hematuria.  His back pain is reasonably controlled.  He is able to ambulate without much difficulty.  Depression reasonably controlled on current meds.    Patient's complete Health Risk Assessment and

## 2024-01-11 ENCOUNTER — HOSPITAL ENCOUNTER (OUTPATIENT)
Age: 79
Discharge: HOME | End: 2024-01-11
Payer: MEDICARE

## 2024-01-11 ENCOUNTER — HOSPITAL ENCOUNTER (OUTPATIENT)
Facility: HOSPITAL | Age: 79
Discharge: HOME | End: 2024-01-11
Payer: MEDICARE

## 2024-01-11 VITALS
SYSTOLIC BLOOD PRESSURE: 148 MMHG | BODY MASS INDEX: 35.29 KG/M2 | DIASTOLIC BLOOD PRESSURE: 81 MMHG | HEART RATE: 78 BPM | HEIGHT: 74 IN | TEMPERATURE: 97.8 F | WEIGHT: 275 LBS | OXYGEN SATURATION: 96 %

## 2024-01-11 LAB
ABO + RH BLD: NORMAL
ALBUMIN SERPL-MCNC: 3.7 G/DL (ref 3.5–5)
ALBUMIN/GLOB SERPL: 1.1 (ref 1.1–2.2)
ALP SERPL-CCNC: 101 U/L (ref 45–117)
ALT SERPL-CCNC: 33 U/L (ref 12–78)
ANION GAP SERPL CALC-SCNC: 5 MMOL/L (ref 5–15)
APPEARANCE UR: ABNORMAL
AST SERPL-CCNC: 28 U/L (ref 15–37)
BACTERIA URNS QL MICRO: ABNORMAL /HPF
BASOPHILS # BLD: 0.1 K/UL (ref 0–0.1)
BASOPHILS NFR BLD: 1 % (ref 0–1)
BILIRUB SERPL-MCNC: 0.5 MG/DL (ref 0.2–1)
BILIRUB UR QL: NEGATIVE
BLOOD GROUP ANTIBODIES SERPL: NORMAL
BUN SERPL-MCNC: 25 MG/DL (ref 6–20)
BUN/CREAT SERPL: 27 (ref 12–20)
CALCIUM SERPL-MCNC: 8.9 MG/DL (ref 8.5–10.1)
CHLORIDE SERPL-SCNC: 105 MMOL/L (ref 97–108)
CO2 SERPL-SCNC: 26 MMOL/L (ref 21–32)
COLOR UR: ABNORMAL
CREAT SERPL-MCNC: 0.92 MG/DL (ref 0.7–1.3)
DIFFERENTIAL METHOD BLD: ABNORMAL
EOSINOPHIL # BLD: 0.2 K/UL (ref 0–0.4)
EOSINOPHIL NFR BLD: 3 % (ref 0–7)
EPITH CASTS URNS QL MICRO: ABNORMAL /LPF
ERYTHROCYTE [DISTWIDTH] IN BLOOD BY AUTOMATED COUNT: 14 % (ref 11.5–14.5)
GLOBULIN SER CALC-MCNC: 3.3 G/DL (ref 2–4)
GLUCOSE SERPL-MCNC: 133 MG/DL (ref 65–100)
GLUCOSE UR STRIP.AUTO-MCNC: NEGATIVE MG/DL
HCT VFR BLD AUTO: 42.1 % (ref 36.6–50.3)
HGB BLD-MCNC: 14.7 G/DL (ref 12.1–17)
HGB UR QL STRIP: ABNORMAL
IMM GRANULOCYTES # BLD AUTO: 0 K/UL (ref 0–0.04)
IMM GRANULOCYTES NFR BLD AUTO: 1 % (ref 0–0.5)
KETONES UR QL STRIP.AUTO: NEGATIVE MG/DL
LEUKOCYTE ESTERASE UR QL STRIP.AUTO: ABNORMAL
LYMPHOCYTES # BLD: 1.3 K/UL (ref 0.8–3.5)
LYMPHOCYTES NFR BLD: 21 % (ref 12–49)
MCH RBC QN AUTO: 34.2 PG (ref 26–34)
MCHC RBC AUTO-ENTMCNC: 34.9 G/DL (ref 30–36.5)
MCV RBC AUTO: 97.9 FL (ref 80–99)
MONOCYTES # BLD: 0.8 K/UL (ref 0–1)
MONOCYTES NFR BLD: 12 % (ref 5–13)
NEUTS SEG # BLD: 4 K/UL (ref 1.8–8)
NEUTS SEG NFR BLD: 62 % (ref 32–75)
NITRITE UR QL STRIP.AUTO: POSITIVE
NRBC # BLD: 0 K/UL (ref 0–0.01)
NRBC BLD-RTO: 0 PER 100 WBC
PH UR STRIP: 6.5 (ref 5–8)
PLATELET # BLD AUTO: 151 K/UL (ref 150–400)
PMV BLD AUTO: 10 FL (ref 8.9–12.9)
POTASSIUM SERPL-SCNC: 4.4 MMOL/L (ref 3.5–5.1)
PROT SERPL-MCNC: 7 G/DL (ref 6.4–8.2)
PROT UR STRIP-MCNC: 30 MG/DL
RBC # BLD AUTO: 4.3 M/UL (ref 4.1–5.7)
RBC #/AREA URNS HPF: ABNORMAL /HPF (ref 0–5)
SODIUM SERPL-SCNC: 136 MMOL/L (ref 136–145)
SP GR UR REFRACTOMETRY: 1.01 (ref 1–1.03)
SPECIMEN EXP DATE BLD: NORMAL
URINE CULTURE IF INDICATED: ABNORMAL
UROBILINOGEN UR QL STRIP.AUTO: 0.2 EU/DL (ref 0.2–1)
WBC # BLD AUTO: 6.4 K/UL (ref 4.1–11.1)
WBC URNS QL MICRO: >100 /HPF (ref 0–4)

## 2024-01-11 PROCEDURE — 87077 CULTURE AEROBIC IDENTIFY: CPT

## 2024-01-11 PROCEDURE — 93005 ELECTROCARDIOGRAM TRACING: CPT | Performed by: UROLOGY

## 2024-01-11 PROCEDURE — 81001 URINALYSIS AUTO W/SCOPE: CPT

## 2024-01-11 PROCEDURE — 85025 COMPLETE CBC W/AUTO DIFF WBC: CPT

## 2024-01-11 PROCEDURE — 86901 BLOOD TYPING SEROLOGIC RH(D): CPT

## 2024-01-11 PROCEDURE — 86900 BLOOD TYPING SEROLOGIC ABO: CPT

## 2024-01-11 PROCEDURE — 86850 RBC ANTIBODY SCREEN: CPT

## 2024-01-11 PROCEDURE — 87086 URINE CULTURE/COLONY COUNT: CPT

## 2024-01-11 PROCEDURE — 87186 SC STD MICRODIL/AGAR DIL: CPT

## 2024-01-11 PROCEDURE — 36415 COLL VENOUS BLD VENIPUNCTURE: CPT

## 2024-01-11 PROCEDURE — 71046 X-RAY EXAM CHEST 2 VIEWS: CPT

## 2024-01-11 PROCEDURE — 80053 COMPREHEN METABOLIC PANEL: CPT

## 2024-01-11 RX ORDER — EPLERENONE 50 MG/1
50 TABLET, FILM COATED ORAL DAILY
COMMUNITY

## 2024-01-11 RX ORDER — AMLODIPINE BESYLATE 5 MG/1
7.5 TABLET ORAL DAILY
COMMUNITY

## 2024-01-11 RX ORDER — METOPROLOL SUCCINATE 50 MG/1
50 TABLET, EXTENDED RELEASE ORAL EVERY EVENING
COMMUNITY

## 2024-01-11 NOTE — PERIOP NOTE
PT PROVIDED INSTRUCTION ON CHEST X-RAY. AT [facility] [address]/GROUND FLOOR.

## 2024-01-11 NOTE — PERIOP NOTE
[facility]'S PREOPERATIVE INSTRUCTIONS  [facility]    Surgery Date:   1/16/2024    Your surgeon's office or [facility] staff will call you between 4 PM- 8 PM the day before surgery with your arrival time. If your surgery is on a Monday, you will receive a call the preceding Friday. If your surgeon's office has given you, your arrival time, then go by that time.    Please report to [facility] Patient Access/Admitting on the 1st floor.  Bring your insurance card, photo identification, and any copayment ( if applicable).   If you are going home the same day of your surgery, you must have a responsible adult to drive you home. You need to have a responsible adult to stay with you the first 24 hours after surgery and you should not drive a car for 24 hours following your surgery.  If you are being admitted to the hospital, please leave personal belongings/luggage in your car until you have an assigned hospital room number.  Please refer to [facility] book for Pre-operative Nutrition Plan.  Do NOT drink alcohol or smoke 24 hours before surgery. STOP smoking for 14 days prior as it helps with breathing and healing after surgery.  Please wear comfortable clothes. Wear your glasses instead of contacts. We ask that all money, jewelry and valuables be left at home. Wear no make up, particularly mascara, the day of surgery.    All body piercings, rings, and jewelry need to be removed and left at home. Do not wear any nail polish except or clear. Please wear your hair loose or down, no pony-tails, buns, or any metal hair accessories. You may wear deodorant.  Do not shave any body area within 24 hours of your surgery.  Please follow all instructions to avoid any potential surgical cancellation.  Should your physical condition change, (i.e. fever, cold, flu, etc.) please notify your surgeon as soon as possible.  It is important to be on time. If a situation occurs where you may be delayed, please call:  [phone] / [phone]  Hex-A-Clens or store brand)  CHG soap will be provided at your Preadmission Testing (PAT) appointment.  If you do not have a PAT appointment before surgery, you may arrange to  CHG soap from our office or purchase CHG soap at a pharmacy, grocery or department store.  You need to purchase TWO 4 ounce bottles to use for your 2 showers.    Steps to follow:  Wash your hair with your normal shampoo and your body with regular soap and rinse well to remove shampoo and soap from your skin.  Wet a clean washcloth and turn off the shower.  Put CHG soap on washcloth and apply to your entire body from the neck down. Do not use on your head, face or private parts(genitals). Do not use CHG soap on open sores, wounds or areas of skin irritation.  Wash you body gently for 5 minutes. Do not wash your skin too hard. This soap does not create lather. Pay special attention to your underarms and from your belly button to your feet.  Turn the shower back on and rinse well to get CHG soap off your body.  Pat your skin dry with a clean, dry towel. Do not apply lotions or moisturizer.  Put on clean clothes and sleep on fresh bed sheets and do not allow pets to sleep with you.    Shower with CHG soap 2 times before your surgery  The evening before your surgery  The morning of your surgery      Tips to help prevent infections after your surgery:  Protect your surgical wound from germs:  Hand washing is the most important thing you and your caregivers can do to prevent infections.  Keep your bandage clean and dry!  Do not touch your surgical wound.  Use clean, freshly washed towels and washcloths every time you shower; do not share bath linens with others.  Until your surgical wound is healed, wear clothing and sleep on bed linens each day that are clean and freshly washed.  Do not allow pets to sleep in your bed with you or touch your surgical wound.  Do not smoke - smoking delays wound healing. This may be a good time to stop

## 2024-01-11 NOTE — PERIOP NOTE
PAT Nutrition Screen    1. Has the patient had an unplanned weight loss of 10% of body weight or more in the last 6 months? No = 0   2. Has the patient been eating less than 50% of their normal diet in the preceding week? No = 0       Patient instructed on Non-Diabetic pre-operative nutrition plan to start 5 days prior to surgery. Patient given 10 shakes and 3   pre-surgery drinks. Opportunity given for questions and all questions answered.       PT WAS GIVEN INCENTIVE SPIROMETER, AND DEMONSTRATED HOW TO CORRECTLY USE IT. PT VERBALIZED UNDERSTANDING.     PT WAS GIVEN CHG SOAP, AND CHG WIPES INCASE PT IS UNABLE TO SHOWER IN THE MORNING D/T DIZZINESS.

## 2024-01-12 LAB
EKG DIAGNOSIS: NORMAL
EKG Q-T INTERVAL: 370 MS
EKG QRS DURATION: 130 MS
EKG QTC CALCULATION (BAZETT): 445 MS
EKG R AXIS: 18 DEGREES
EKG T AXIS: 27 DEGREES
EKG VENTRICULAR RATE: 87 BPM

## 2024-01-12 PROCEDURE — 93010 ELECTROCARDIOGRAM REPORT: CPT | Performed by: SPECIALIST

## 2024-01-12 RX ORDER — MIRTAZAPINE 30 MG/1
TABLET, FILM COATED ORAL
Qty: 90 TABLET | Refills: 1 | Status: SHIPPED | OUTPATIENT
Start: 2024-01-12

## 2024-01-12 NOTE — PERIOP NOTE
EKG reviewed by Anita Gaxiola NP , EKG is Okay , No need for anesthesia review.    Faxed to surgeon office with confirmation of fax received

## 2024-01-12 NOTE — PERIOP NOTE
URINE CULTURE PENDING, AND SURGERY IS TUESDAY 1/16. NURSE CALLED [facility] AND SPOKE TO RAMESH. PER RAMESH, SHE WILL MAKE THE TRIAGE NURSE AWARE.

## 2024-01-13 LAB
BACTERIA SPEC CULT: ABNORMAL
CC UR VC: ABNORMAL
SERVICE CMNT-IMP: ABNORMAL

## 2024-01-15 NOTE — PERIOP NOTE
BRYAN FROM [facility] LEFT  MSG, PATIENT STARTED ON CIPRO 500 MG X 7 DAYS PER DR. MCDANIEL.    breathing is unlabored without accessory muscle use. , no deformities

## 2024-01-15 NOTE — PERIOP NOTE
Faxed preadmission testing reports (and fax confirmation received) to Dr. Mccain. Called on 1-15-24 at 835 am ( left message with Hayes) RE: abnormal urine culture.  Hayes was made aware patients surgery is 1-16-24..

## 2024-01-16 ENCOUNTER — ANESTHESIA (OUTPATIENT)
Facility: HOSPITAL | Age: 79
DRG: 654 | End: 2024-01-16
Payer: MEDICARE

## 2024-01-16 ENCOUNTER — ANESTHESIA EVENT (OUTPATIENT)
Facility: HOSPITAL | Age: 79
DRG: 654 | End: 2024-01-16
Payer: MEDICARE

## 2024-01-16 ENCOUNTER — HOSPITAL ENCOUNTER (INPATIENT)
Facility: HOSPITAL | Age: 79
LOS: 1 days | Discharge: HOME | DRG: 654 | End: 2024-01-17
Attending: UROLOGY | Admitting: UROLOGY
Payer: MEDICARE

## 2024-01-16 PROBLEM — C67.9 BLADDER CANCER (HCC): Status: ACTIVE | Noted: 2024-01-16

## 2024-01-16 LAB
ANION GAP SERPL CALC-SCNC: 7 MMOL/L (ref 5–15)
BUN SERPL-MCNC: 20 MG/DL (ref 6–20)
BUN/CREAT SERPL: 14 (ref 12–20)
CALCIUM SERPL-MCNC: 8.2 MG/DL (ref 8.5–10.1)
CHLORIDE SERPL-SCNC: 107 MMOL/L (ref 97–108)
CO2 SERPL-SCNC: 25 MMOL/L (ref 21–32)
CREAT SERPL-MCNC: 1.39 MG/DL (ref 0.7–1.3)
GLUCOSE SERPL-MCNC: 180 MG/DL (ref 65–100)
HCT VFR BLD AUTO: 37.4 % (ref 36.6–50.3)
HGB BLD-MCNC: 12.7 G/DL (ref 12.1–17)
POTASSIUM SERPL-SCNC: 4.4 MMOL/L (ref 3.5–5.1)
SODIUM SERPL-SCNC: 139 MMOL/L (ref 136–145)

## 2024-01-16 PROCEDURE — 2709999900 HC NON-CHARGEABLE SUPPLY: Performed by: UROLOGY

## 2024-01-16 PROCEDURE — 6360000002 HC RX W HCPCS: Performed by: UROLOGY

## 2024-01-16 PROCEDURE — 2580000003 HC RX 258: Performed by: NURSE ANESTHETIST, CERTIFIED REGISTERED

## 2024-01-16 PROCEDURE — 8E0W4CZ ROBOTIC ASSISTED PROCEDURE OF TRUNK REGION, PERCUTANEOUS ENDOSCOPIC APPROACH: ICD-10-PCS | Performed by: UROLOGY

## 2024-01-16 PROCEDURE — 36415 COLL VENOUS BLD VENIPUNCTURE: CPT

## 2024-01-16 PROCEDURE — 88309 TISSUE EXAM BY PATHOLOGIST: CPT

## 2024-01-16 PROCEDURE — 85014 HEMATOCRIT: CPT

## 2024-01-16 PROCEDURE — 3600000019 HC SURGERY ROBOT ADDTL 15MIN: Performed by: UROLOGY

## 2024-01-16 PROCEDURE — 2580000003 HC RX 258: Performed by: UROLOGY

## 2024-01-16 PROCEDURE — P9045 ALBUMIN (HUMAN), 5%, 250 ML: HCPCS | Performed by: NURSE ANESTHETIST, CERTIFIED REGISTERED

## 2024-01-16 PROCEDURE — 6370000000 HC RX 637 (ALT 250 FOR IP): Performed by: UROLOGY

## 2024-01-16 PROCEDURE — 2720000010 HC SURG SUPPLY STERILE: Performed by: UROLOGY

## 2024-01-16 PROCEDURE — 6360000002 HC RX W HCPCS

## 2024-01-16 PROCEDURE — 85018 HEMOGLOBIN: CPT

## 2024-01-16 PROCEDURE — 3700000000 HC ANESTHESIA ATTENDED CARE: Performed by: UROLOGY

## 2024-01-16 PROCEDURE — 1100000000 HC RM PRIVATE

## 2024-01-16 PROCEDURE — 7100000000 HC PACU RECOVERY - FIRST 15 MIN: Performed by: UROLOGY

## 2024-01-16 PROCEDURE — 2500000003 HC RX 250 WO HCPCS: Performed by: NURSE ANESTHETIST, CERTIFIED REGISTERED

## 2024-01-16 PROCEDURE — 6360000002 HC RX W HCPCS: Performed by: ANESTHESIOLOGY

## 2024-01-16 PROCEDURE — 88305 TISSUE EXAM BY PATHOLOGIST: CPT

## 2024-01-16 PROCEDURE — C1729 CATH, DRAINAGE: HCPCS | Performed by: UROLOGY

## 2024-01-16 PROCEDURE — 0TBB4ZZ EXCISION OF BLADDER, PERCUTANEOUS ENDOSCOPIC APPROACH: ICD-10-PCS | Performed by: UROLOGY

## 2024-01-16 PROCEDURE — 88331 PATH CONSLTJ SURG 1 BLK 1SPC: CPT

## 2024-01-16 PROCEDURE — 3700000001 HC ADD 15 MINUTES (ANESTHESIA): Performed by: UROLOGY

## 2024-01-16 PROCEDURE — 6370000000 HC RX 637 (ALT 250 FOR IP): Performed by: ANESTHESIOLOGY

## 2024-01-16 PROCEDURE — 07BC4ZX EXCISION OF PELVIS LYMPHATIC, PERCUTANEOUS ENDOSCOPIC APPROACH, DIAGNOSTIC: ICD-10-PCS | Performed by: UROLOGY

## 2024-01-16 PROCEDURE — 3600000009 HC SURGERY ROBOT BASE: Performed by: UROLOGY

## 2024-01-16 PROCEDURE — 2580000003 HC RX 258: Performed by: ANESTHESIOLOGY

## 2024-01-16 PROCEDURE — 7100000001 HC PACU RECOVERY - ADDTL 15 MIN: Performed by: UROLOGY

## 2024-01-16 PROCEDURE — 80048 BASIC METABOLIC PNL TOTAL CA: CPT

## 2024-01-16 PROCEDURE — 88342 IMHCHEM/IMCYTCHM 1ST ANTB: CPT

## 2024-01-16 PROCEDURE — 6360000002 HC RX W HCPCS: Performed by: NURSE ANESTHETIST, CERTIFIED REGISTERED

## 2024-01-16 PROCEDURE — S2900 ROBOTIC SURGICAL SYSTEM: HCPCS | Performed by: UROLOGY

## 2024-01-16 RX ORDER — OXYCODONE HYDROCHLORIDE 5 MG/1
5 TABLET ORAL
Status: DISCONTINUED | OUTPATIENT
Start: 2024-01-16 | End: 2024-01-16 | Stop reason: HOSPADM

## 2024-01-16 RX ORDER — SODIUM CHLORIDE, SODIUM LACTATE, POTASSIUM CHLORIDE, CALCIUM CHLORIDE 600; 310; 30; 20 MG/100ML; MG/100ML; MG/100ML; MG/100ML
INJECTION, SOLUTION INTRAVENOUS CONTINUOUS PRN
Status: DISCONTINUED | OUTPATIENT
Start: 2024-01-16 | End: 2024-01-16 | Stop reason: SDUPTHER

## 2024-01-16 RX ORDER — SODIUM CHLORIDE 9 MG/ML
INJECTION, SOLUTION INTRAVENOUS PRN
Status: DISCONTINUED | OUTPATIENT
Start: 2024-01-16 | End: 2024-01-16 | Stop reason: HOSPADM

## 2024-01-16 RX ORDER — ACETAMINOPHEN 325 MG/1
650 TABLET ORAL ONCE
Status: COMPLETED | OUTPATIENT
Start: 2024-01-16 | End: 2024-01-16

## 2024-01-16 RX ORDER — SODIUM CHLORIDE 0.9 % (FLUSH) 0.9 %
5-40 SYRINGE (ML) INJECTION EVERY 12 HOURS SCHEDULED
Status: DISCONTINUED | OUTPATIENT
Start: 2024-01-16 | End: 2024-01-16 | Stop reason: HOSPADM

## 2024-01-16 RX ORDER — LIDOCAINE HYDROCHLORIDE ANHYDROUS AND DEXTROSE MONOHYDRATE .8; 5 G/100ML; G/100ML
INJECTION, SOLUTION INTRAVENOUS CONTINUOUS PRN
Status: DISCONTINUED | OUTPATIENT
Start: 2024-01-16 | End: 2024-01-16 | Stop reason: SDUPTHER

## 2024-01-16 RX ORDER — FENTANYL CITRATE 50 UG/ML
INJECTION, SOLUTION INTRAMUSCULAR; INTRAVENOUS PRN
Status: DISCONTINUED | OUTPATIENT
Start: 2024-01-16 | End: 2024-01-16 | Stop reason: SDUPTHER

## 2024-01-16 RX ORDER — TROSPIUM CHLORIDE 20 MG/1
20 TABLET, FILM COATED ORAL DAILY
Status: DISCONTINUED | OUTPATIENT
Start: 2024-01-16 | End: 2024-01-17 | Stop reason: HOSPADM

## 2024-01-16 RX ORDER — ACETAMINOPHEN 500 MG
1000 TABLET ORAL EVERY 6 HOURS
Status: DISCONTINUED | OUTPATIENT
Start: 2024-01-16 | End: 2024-01-17 | Stop reason: HOSPADM

## 2024-01-16 RX ORDER — MIDAZOLAM HYDROCHLORIDE 2 MG/2ML
2 INJECTION, SOLUTION INTRAMUSCULAR; INTRAVENOUS
Status: DISCONTINUED | OUTPATIENT
Start: 2024-01-16 | End: 2024-01-16 | Stop reason: HOSPADM

## 2024-01-16 RX ORDER — MAGNESIUM SULFATE HEPTAHYDRATE 40 MG/ML
INJECTION, SOLUTION INTRAVENOUS PRN
Status: DISCONTINUED | OUTPATIENT
Start: 2024-01-16 | End: 2024-01-16 | Stop reason: SDUPTHER

## 2024-01-16 RX ORDER — AMLODIPINE BESYLATE 5 MG/1
7.5 TABLET ORAL DAILY
Status: DISCONTINUED | OUTPATIENT
Start: 2024-01-17 | End: 2024-01-17 | Stop reason: HOSPADM

## 2024-01-16 RX ORDER — PROPOFOL 10 MG/ML
INJECTION, EMULSION INTRAVENOUS PRN
Status: DISCONTINUED | OUTPATIENT
Start: 2024-01-16 | End: 2024-01-16 | Stop reason: SDUPTHER

## 2024-01-16 RX ORDER — ONDANSETRON 2 MG/ML
4 INJECTION INTRAMUSCULAR; INTRAVENOUS
Status: DISCONTINUED | OUTPATIENT
Start: 2024-01-16 | End: 2024-01-16 | Stop reason: HOSPADM

## 2024-01-16 RX ORDER — SODIUM CHLORIDE 9 MG/ML
INJECTION, SOLUTION INTRAVENOUS CONTINUOUS
Status: DISCONTINUED | OUTPATIENT
Start: 2024-01-16 | End: 2024-01-16 | Stop reason: HOSPADM

## 2024-01-16 RX ORDER — HYDROMORPHONE HYDROCHLORIDE 1 MG/ML
0.5 INJECTION, SOLUTION INTRAMUSCULAR; INTRAVENOUS; SUBCUTANEOUS EVERY 5 MIN PRN
Status: DISCONTINUED | OUTPATIENT
Start: 2024-01-16 | End: 2024-01-16 | Stop reason: HOSPADM

## 2024-01-16 RX ORDER — ONDANSETRON 2 MG/ML
INJECTION INTRAMUSCULAR; INTRAVENOUS PRN
Status: DISCONTINUED | OUTPATIENT
Start: 2024-01-16 | End: 2024-01-16 | Stop reason: SDUPTHER

## 2024-01-16 RX ORDER — PREGABALIN 25 MG/1
50 CAPSULE ORAL
Status: DISCONTINUED | OUTPATIENT
Start: 2024-01-16 | End: 2024-01-17 | Stop reason: HOSPADM

## 2024-01-16 RX ORDER — GLYCOPYRROLATE 0.2 MG/ML
INJECTION, SOLUTION INTRAMUSCULAR; INTRAVENOUS PRN
Status: DISCONTINUED | OUTPATIENT
Start: 2024-01-16 | End: 2024-01-16 | Stop reason: SDUPTHER

## 2024-01-16 RX ORDER — IBUPROFEN 200 MG
TABLET ORAL 2 TIMES DAILY
Status: DISCONTINUED | OUTPATIENT
Start: 2024-01-16 | End: 2024-01-17 | Stop reason: HOSPADM

## 2024-01-16 RX ORDER — MIDAZOLAM HYDROCHLORIDE 1 MG/ML
INJECTION INTRAMUSCULAR; INTRAVENOUS PRN
Status: DISCONTINUED | OUTPATIENT
Start: 2024-01-16 | End: 2024-01-16 | Stop reason: SDUPTHER

## 2024-01-16 RX ORDER — ONDANSETRON 2 MG/ML
INJECTION INTRAMUSCULAR; INTRAVENOUS
Status: COMPLETED
Start: 2024-01-16 | End: 2024-01-16

## 2024-01-16 RX ORDER — MIRTAZAPINE 15 MG/1
30 TABLET, FILM COATED ORAL NIGHTLY
Status: DISCONTINUED | OUTPATIENT
Start: 2024-01-16 | End: 2024-01-17 | Stop reason: HOSPADM

## 2024-01-16 RX ORDER — BUPIVACAINE HYDROCHLORIDE 5 MG/ML
INJECTION, SOLUTION EPIDURAL; INTRACAUDAL PRN
Status: DISCONTINUED | OUTPATIENT
Start: 2024-01-16 | End: 2024-01-16 | Stop reason: HOSPADM

## 2024-01-16 RX ORDER — SODIUM CHLORIDE 9 MG/ML
INJECTION, SOLUTION INTRAVENOUS PRN
Status: DISCONTINUED | OUTPATIENT
Start: 2024-01-16 | End: 2024-01-17 | Stop reason: HOSPADM

## 2024-01-16 RX ORDER — SODIUM CHLORIDE, SODIUM LACTATE, POTASSIUM CHLORIDE, CALCIUM CHLORIDE 600; 310; 30; 20 MG/100ML; MG/100ML; MG/100ML; MG/100ML
INJECTION, SOLUTION INTRAVENOUS CONTINUOUS
Status: DISCONTINUED | OUTPATIENT
Start: 2024-01-16 | End: 2024-01-16 | Stop reason: HOSPADM

## 2024-01-16 RX ORDER — DEXAMETHASONE SODIUM PHOSPHATE 4 MG/ML
INJECTION, SOLUTION INTRA-ARTICULAR; INTRALESIONAL; INTRAMUSCULAR; INTRAVENOUS; SOFT TISSUE PRN
Status: DISCONTINUED | OUTPATIENT
Start: 2024-01-16 | End: 2024-01-16 | Stop reason: SDUPTHER

## 2024-01-16 RX ORDER — PROCHLORPERAZINE EDISYLATE 5 MG/ML
5 INJECTION INTRAMUSCULAR; INTRAVENOUS
Status: DISCONTINUED | OUTPATIENT
Start: 2024-01-16 | End: 2024-01-16 | Stop reason: HOSPADM

## 2024-01-16 RX ORDER — FENTANYL CITRATE 50 UG/ML
50 INJECTION, SOLUTION INTRAMUSCULAR; INTRAVENOUS
Status: DISCONTINUED | OUTPATIENT
Start: 2024-01-16 | End: 2024-01-16 | Stop reason: HOSPADM

## 2024-01-16 RX ORDER — LIDOCAINE HYDROCHLORIDE 20 MG/ML
INJECTION, SOLUTION EPIDURAL; INFILTRATION; INTRACAUDAL; PERINEURAL PRN
Status: DISCONTINUED | OUTPATIENT
Start: 2024-01-16 | End: 2024-01-16 | Stop reason: SDUPTHER

## 2024-01-16 RX ORDER — ROCURONIUM BROMIDE 10 MG/ML
INJECTION, SOLUTION INTRAVENOUS PRN
Status: DISCONTINUED | OUTPATIENT
Start: 2024-01-16 | End: 2024-01-16 | Stop reason: SDUPTHER

## 2024-01-16 RX ORDER — DIPHENHYDRAMINE HYDROCHLORIDE 50 MG/ML
12.5 INJECTION INTRAMUSCULAR; INTRAVENOUS
Status: DISCONTINUED | OUTPATIENT
Start: 2024-01-16 | End: 2024-01-16 | Stop reason: HOSPADM

## 2024-01-16 RX ORDER — FENTANYL CITRATE 50 UG/ML
25 INJECTION, SOLUTION INTRAMUSCULAR; INTRAVENOUS
Status: DISCONTINUED | OUTPATIENT
Start: 2024-01-16 | End: 2024-01-16 | Stop reason: HOSPADM

## 2024-01-16 RX ORDER — LIDOCAINE HYDROCHLORIDE 10 MG/ML
1 INJECTION, SOLUTION EPIDURAL; INFILTRATION; INTRACAUDAL; PERINEURAL
Status: DISCONTINUED | OUTPATIENT
Start: 2024-01-16 | End: 2024-01-16 | Stop reason: HOSPADM

## 2024-01-16 RX ORDER — METOPROLOL SUCCINATE 50 MG/1
50 TABLET, EXTENDED RELEASE ORAL EVERY EVENING
Status: DISCONTINUED | OUTPATIENT
Start: 2024-01-16 | End: 2024-01-17 | Stop reason: HOSPADM

## 2024-01-16 RX ORDER — OXYBUTYNIN CHLORIDE 5 MG/1
5 TABLET ORAL EVERY EVENING
Status: DISCONTINUED | OUTPATIENT
Start: 2024-01-16 | End: 2024-01-17 | Stop reason: HOSPADM

## 2024-01-16 RX ORDER — ONDANSETRON 2 MG/ML
4 INJECTION INTRAMUSCULAR; INTRAVENOUS EVERY 6 HOURS PRN
Status: DISCONTINUED | OUTPATIENT
Start: 2024-01-16 | End: 2024-01-17 | Stop reason: HOSPADM

## 2024-01-16 RX ORDER — SODIUM CHLORIDE 0.9 % (FLUSH) 0.9 %
5-40 SYRINGE (ML) INJECTION PRN
Status: DISCONTINUED | OUTPATIENT
Start: 2024-01-16 | End: 2024-01-16 | Stop reason: HOSPADM

## 2024-01-16 RX ORDER — ONDANSETRON 4 MG/1
4 TABLET, ORALLY DISINTEGRATING ORAL EVERY 8 HOURS PRN
Status: DISCONTINUED | OUTPATIENT
Start: 2024-01-16 | End: 2024-01-17 | Stop reason: HOSPADM

## 2024-01-16 RX ORDER — KETAMINE HCL IN NACL, ISO-OSM 100MG/10ML
SYRINGE (ML) INJECTION PRN
Status: DISCONTINUED | OUTPATIENT
Start: 2024-01-16 | End: 2024-01-16 | Stop reason: SDUPTHER

## 2024-01-16 RX ORDER — SODIUM CHLORIDE 0.9 % (FLUSH) 0.9 %
5-40 SYRINGE (ML) INJECTION PRN
Status: DISCONTINUED | OUTPATIENT
Start: 2024-01-16 | End: 2024-01-17 | Stop reason: HOSPADM

## 2024-01-16 RX ORDER — EPLERENONE 25 MG/1
50 TABLET, FILM COATED ORAL DAILY
Status: DISCONTINUED | OUTPATIENT
Start: 2024-01-16 | End: 2024-01-17 | Stop reason: HOSPADM

## 2024-01-16 RX ORDER — NEOSTIGMINE METHYLSULFATE 1 MG/ML
INJECTION, SOLUTION INTRAVENOUS PRN
Status: DISCONTINUED | OUTPATIENT
Start: 2024-01-16 | End: 2024-01-16 | Stop reason: SDUPTHER

## 2024-01-16 RX ORDER — ASPIRIN 81 MG/1
81 TABLET ORAL DAILY
Status: DISCONTINUED | OUTPATIENT
Start: 2024-01-17 | End: 2024-01-17 | Stop reason: HOSPADM

## 2024-01-16 RX ORDER — LABETALOL HYDROCHLORIDE 5 MG/ML
10 INJECTION, SOLUTION INTRAVENOUS
Status: DISCONTINUED | OUTPATIENT
Start: 2024-01-16 | End: 2024-01-16 | Stop reason: HOSPADM

## 2024-01-16 RX ORDER — SODIUM CHLORIDE, SODIUM LACTATE, POTASSIUM CHLORIDE, CALCIUM CHLORIDE 600; 310; 30; 20 MG/100ML; MG/100ML; MG/100ML; MG/100ML
INJECTION, SOLUTION INTRAVENOUS CONTINUOUS
Status: DISCONTINUED | OUTPATIENT
Start: 2024-01-16 | End: 2024-01-17 | Stop reason: HOSPADM

## 2024-01-16 RX ORDER — FENTANYL CITRATE 50 UG/ML
25 INJECTION, SOLUTION INTRAMUSCULAR; INTRAVENOUS EVERY 5 MIN PRN
Status: COMPLETED | OUTPATIENT
Start: 2024-01-16 | End: 2024-01-16

## 2024-01-16 RX ORDER — ALBUMIN, HUMAN INJ 5% 5 %
SOLUTION INTRAVENOUS PRN
Status: DISCONTINUED | OUTPATIENT
Start: 2024-01-16 | End: 2024-01-16 | Stop reason: SDUPTHER

## 2024-01-16 RX ORDER — SODIUM CHLORIDE 0.9 % (FLUSH) 0.9 %
5-40 SYRINGE (ML) INJECTION EVERY 12 HOURS SCHEDULED
Status: DISCONTINUED | OUTPATIENT
Start: 2024-01-16 | End: 2024-01-17 | Stop reason: HOSPADM

## 2024-01-16 RX ORDER — MORPHINE SULFATE 2 MG/ML
2 INJECTION, SOLUTION INTRAMUSCULAR; INTRAVENOUS
Status: DISCONTINUED | OUTPATIENT
Start: 2024-01-16 | End: 2024-01-17 | Stop reason: HOSPADM

## 2024-01-16 RX ORDER — OXYCODONE HYDROCHLORIDE 5 MG/1
5 TABLET ORAL EVERY 4 HOURS PRN
Status: DISCONTINUED | OUTPATIENT
Start: 2024-01-16 | End: 2024-01-17 | Stop reason: HOSPADM

## 2024-01-16 RX ORDER — MEPERIDINE HYDROCHLORIDE 25 MG/ML
12.5 INJECTION INTRAMUSCULAR; INTRAVENOUS; SUBCUTANEOUS EVERY 5 MIN PRN
Status: DISCONTINUED | OUTPATIENT
Start: 2024-01-16 | End: 2024-01-16 | Stop reason: HOSPADM

## 2024-01-16 RX ORDER — ENOXAPARIN SODIUM 100 MG/ML
30 INJECTION SUBCUTANEOUS EVERY 12 HOURS SCHEDULED
Status: DISCONTINUED | OUTPATIENT
Start: 2024-01-17 | End: 2024-01-17 | Stop reason: HOSPADM

## 2024-01-16 RX ORDER — PHENYLEPHRINE HYDROCHLORIDE 10 MG/ML
INJECTION INTRAVENOUS PRN
Status: DISCONTINUED | OUTPATIENT
Start: 2024-01-16 | End: 2024-01-16 | Stop reason: SDUPTHER

## 2024-01-16 RX ADMIN — ALBUMIN (HUMAN) 12.5 G: 12.5 INJECTION, SOLUTION INTRAVENOUS at 07:50

## 2024-01-16 RX ADMIN — ALBUMIN (HUMAN) 12.5 G: 12.5 INJECTION, SOLUTION INTRAVENOUS at 09:44

## 2024-01-16 RX ADMIN — FENTANYL CITRATE 50 MCG: 50 INJECTION, SOLUTION INTRAMUSCULAR; INTRAVENOUS at 07:40

## 2024-01-16 RX ADMIN — ROCURONIUM BROMIDE 20 MG: 10 INJECTION, SOLUTION INTRAVENOUS at 08:22

## 2024-01-16 RX ADMIN — SODIUM CHLORIDE, POTASSIUM CHLORIDE, SODIUM LACTATE AND CALCIUM CHLORIDE: 600; 310; 30; 20 INJECTION, SOLUTION INTRAVENOUS at 09:44

## 2024-01-16 RX ADMIN — MIDAZOLAM 1 MG: 1 INJECTION INTRAMUSCULAR; INTRAVENOUS at 07:30

## 2024-01-16 RX ADMIN — MAGNESIUM SULFATE HEPTAHYDRATE 2000 MG: 40 INJECTION, SOLUTION INTRAVENOUS at 07:48

## 2024-01-16 RX ADMIN — ROCURONIUM BROMIDE 20 MG: 10 INJECTION, SOLUTION INTRAVENOUS at 09:42

## 2024-01-16 RX ADMIN — PHENYLEPHRINE HYDROCHLORIDE 20 MCG/MIN: 10 INJECTION INTRAVENOUS at 08:04

## 2024-01-16 RX ADMIN — FENTANYL CITRATE 50 MCG: 50 INJECTION, SOLUTION INTRAMUSCULAR; INTRAVENOUS at 08:22

## 2024-01-16 RX ADMIN — POLYMYXIN B SULFATE, BACITRACIN ZINC, NEOMYCIN SULFATE: 5000; 3.5; 4 OINTMENT TOPICAL at 21:44

## 2024-01-16 RX ADMIN — ACETAMINOPHEN 650 MG: 325 TABLET ORAL at 06:38

## 2024-01-16 RX ADMIN — METOPROLOL SUCCINATE 50 MG: 50 TABLET, EXTENDED RELEASE ORAL at 18:49

## 2024-01-16 RX ADMIN — ACETAMINOPHEN 1000 MG: 500 TABLET ORAL at 18:57

## 2024-01-16 RX ADMIN — MIRTAZAPINE 30 MG: 15 TABLET, FILM COATED ORAL at 21:44

## 2024-01-16 RX ADMIN — GLYCOPYRROLATE 0.4 MG: 0.2 INJECTION, SOLUTION INTRAMUSCULAR; INTRAVENOUS at 11:13

## 2024-01-16 RX ADMIN — CEFOXITIN SODIUM 2000 MG: 2 POWDER, FOR SOLUTION INTRAVENOUS at 10:00

## 2024-01-16 RX ADMIN — LIDOCAINE HYDROCHLORIDE ANHYDROUS AND DEXTROSE MONOHYDRATE 1.5 MG/KG/HR: .8; 5 INJECTION, SOLUTION INTRAVENOUS at 07:59

## 2024-01-16 RX ADMIN — ONDANSETRON 4 MG: 2 INJECTION INTRAMUSCULAR; INTRAVENOUS at 11:49

## 2024-01-16 RX ADMIN — ONDANSETRON 4 MG: 2 INJECTION INTRAMUSCULAR; INTRAVENOUS at 11:04

## 2024-01-16 RX ADMIN — SODIUM CHLORIDE, POTASSIUM CHLORIDE, SODIUM LACTATE AND CALCIUM CHLORIDE: 600; 310; 30; 20 INJECTION, SOLUTION INTRAVENOUS at 18:56

## 2024-01-16 RX ADMIN — ROCURONIUM BROMIDE 50 MG: 10 INJECTION, SOLUTION INTRAVENOUS at 07:40

## 2024-01-16 RX ADMIN — SODIUM CHLORIDE, POTASSIUM CHLORIDE, SODIUM LACTATE AND CALCIUM CHLORIDE: 600; 310; 30; 20 INJECTION, SOLUTION INTRAVENOUS at 07:30

## 2024-01-16 RX ADMIN — LIDOCAINE HYDROCHLORIDE 100 MG: 20 INJECTION, SOLUTION EPIDURAL; INFILTRATION; INTRACAUDAL; PERINEURAL at 07:40

## 2024-01-16 RX ADMIN — CEFOXITIN SODIUM 2000 MG: 2 POWDER, FOR SOLUTION INTRAVENOUS at 07:58

## 2024-01-16 RX ADMIN — ACETAMINOPHEN 1000 MG: 500 TABLET ORAL at 21:44

## 2024-01-16 RX ADMIN — PREGABALIN 50 MG: 25 CAPSULE ORAL at 18:47

## 2024-01-16 RX ADMIN — SODIUM CHLORIDE, POTASSIUM CHLORIDE, SODIUM LACTATE AND CALCIUM CHLORIDE: 600; 310; 30; 20 INJECTION, SOLUTION INTRAVENOUS at 06:50

## 2024-01-16 RX ADMIN — PHENYLEPHRINE HYDROCHLORIDE 80 MCG: 10 INJECTION INTRAVENOUS at 07:40

## 2024-01-16 RX ADMIN — PROPOFOL 130 MG: 10 INJECTION, EMULSION INTRAVENOUS at 07:40

## 2024-01-16 RX ADMIN — FENTANYL CITRATE 25 MCG: 50 INJECTION INTRAMUSCULAR; INTRAVENOUS at 12:02

## 2024-01-16 RX ADMIN — OXYBUTYNIN CHLORIDE 5 MG: 5 TABLET ORAL at 18:49

## 2024-01-16 RX ADMIN — FENTANYL CITRATE 25 MCG: 50 INJECTION INTRAMUSCULAR; INTRAVENOUS at 12:08

## 2024-01-16 RX ADMIN — Medication 50 MG: at 07:48

## 2024-01-16 RX ADMIN — FENTANYL CITRATE 25 MCG: 50 INJECTION INTRAMUSCULAR; INTRAVENOUS at 12:26

## 2024-01-16 RX ADMIN — FENTANYL CITRATE 25 MCG: 50 INJECTION INTRAMUSCULAR; INTRAVENOUS at 13:49

## 2024-01-16 RX ADMIN — NEOSTIGMINE METHYLSULFATE 3 MG: 1 INJECTION, SOLUTION INTRAVENOUS at 11:13

## 2024-01-16 RX ADMIN — DEXAMETHASONE SODIUM PHOSPHATE 4 MG: 4 INJECTION, SOLUTION INTRAMUSCULAR; INTRAVENOUS at 11:05

## 2024-01-16 RX ADMIN — TROSPIUM CHLORIDE 20 MG: 20 TABLET, FILM COATED ORAL at 18:56

## 2024-01-16 ASSESSMENT — PAIN DESCRIPTION - DESCRIPTORS
DESCRIPTORS: ACHING
DESCRIPTORS: SORE
DESCRIPTORS: ACHING
DESCRIPTORS: SORE
DESCRIPTORS: ACHING

## 2024-01-16 ASSESSMENT — PAIN SCALES - GENERAL
PAINLEVEL_OUTOF10: 8
PAINLEVEL_OUTOF10: 3
PAINLEVEL_OUTOF10: 7
PAINLEVEL_OUTOF10: 8
PAINLEVEL_OUTOF10: 2

## 2024-01-16 ASSESSMENT — PAIN DESCRIPTION - LOCATION
LOCATION: ABDOMEN

## 2024-01-16 ASSESSMENT — PAIN - FUNCTIONAL ASSESSMENT: PAIN_FUNCTIONAL_ASSESSMENT: 0-10

## 2024-01-16 ASSESSMENT — PAIN DESCRIPTION - ORIENTATION
ORIENTATION: MID
ORIENTATION: MID

## 2024-01-16 NOTE — PERIOP NOTE
TRANSFER - OUT REPORT:    Verbal report given to CAROL Sánchez on Simba Quintero  being transferred to [facility] for routine post-op       Report consisted of patient's Situation, Background, Assessment and   Recommendations(SBAR).     Information from the following report(s) Nurse Handoff Report, Adult Overview, Surgery Report, MAR, and Recent Results was reviewed with the receiving nurse.           Lines:   Peripheral IV 01/16/24 Left;Posterior Hand (Active)   Site Assessment Clean, dry & intact 01/16/24 1145   Line Status Infusing 01/16/24 1145   Line Care Connections checked and tightened 01/16/24 1145   Phlebitis Assessment No symptoms 01/16/24 1145   Infiltration Assessment 0 01/16/24 1145   Alcohol Cap Used Yes 01/16/24 1145   Dressing Status Clean, dry & intact 01/16/24 1145   Dressing Type Transparent 01/16/24 1145   Dressing Intervention New 01/16/24 0649        Opportunity for questions and clarification was provided.      Patient transported with:  O2 @ 2lpm

## 2024-01-16 NOTE — ANESTHESIA PRE PROCEDURE
Department of Anesthesiology  Preprocedure Note       Name:  Simba Quintero   Age:  78 y.o.  :  1945                                          MRN:  [id number]         Date:  2024      Surgeon: Surgeon(s):  Jose Eduardo Mccain MD Orton, Vernon A II, MD    Procedure: Procedure(s):  ROBOTIC LAPAROSCOPIC PARTIAL CYSTECTOMY WITH PELVIC LYMPH NODE DISSECTION AND CYSTOSCOPY (E R A S)    Medications prior to admission:   Prior to Admission medications    Medication Sig Start Date End Date Taking? Authorizing Provider   mirtazapine (REMERON) 30 MG tablet TAKE ONE TABLET BY MOUTH ONCE NIGHTLY 24   Huang Gibson MD   Pregabalin (LYRICA PO) Take 40 mg by mouth Daily with supper Max Daily Amount: 40 mg    Cma Kumari MD   eplerenone (INSPRA) 50 MG tablet Take 1 tablet by mouth daily    Cam Kumari MD   metoprolol succinate (TOPROL XL) 50 MG extended release tablet Take 1 tablet by mouth every evening    Cam Kumari MD   amLODIPine (NORVASC) 5 MG tablet Take 1.5 tablets by mouth daily    Cam Kumari MD   rosuvastatin (CRESTOR) 10 MG tablet TAKE ONE TABLET BY MOUTH ONCE NIGHTLY 23   Huang Gibson MD   aspirin 81 MG EC tablet Take 1 tablet by mouth daily    Cam Kumari MD   oxybutynin (DITROPAN) 5 MG tablet Take 1 tablet by mouth every evening 23   Cam Kumari MD   Multiple Vitamin (MULTIVITAMIN PO) Take by mouth    Automatic Reconciliation, Ar   apixaban (ELIQUIS) 5 MG TABS tablet TAKE ONE TABLET BY MOUTH TWICE A DAY 21   Automatic Reconciliation, Ar   mirabegron (MYRBETRIQ) 25 MG TB24 Take by mouth daily 8/15/22   Automatic Reconciliation, Ar       Current medications:    Current Facility-Administered Medications   Medication Dose Route Frequency Provider Last Rate Last Admin    lidocaine PF 1 % injection 1 mL  1 mL IntraDERmal Once PRN Herbert Moody MD        fentaNYL (SUBLIMAZE) injection 25 mcg  25 mcg IntraVENous Once PRN

## 2024-01-16 NOTE — DISCHARGE INSTRUCTIONS
POST OPERATIVE INSTRUCTIONS    FOLLOW-UP VISIT    Dr. Mccain or his associate will see you back in the office.   At that time your final pathology report will be reviewed with you.  Your “Lopez” catheter will be evaluated for removal at that time.    DISCHARGE MEDICATIONS  Upon discharge you may use Tylenol (acetaminophen) for pain control. You may take up to 1000 mg every 6 hours as needed for pain.   You may resume your normal medications upon discharge from the hospital with the exception of any blood thinning products. You can continue your aspirin. You may resume your eliquis on Friday 1/19/23. Stop blood thinners and call the office for dark red urine with clots.   Take course of the prescribed antibiotics - Ciprofloxacin- for UTI. Take for 5 days. Then take remaining doses on the day before, the day of, and the day after your follow up visit.   A stool softener is recommended such as Colace to prevent constipation. Stop for diarrhea or loose watery stools.     ACTIVITY    Please refrain from driving for the 1st week after your surgery.  You may shower upon discharge from the hospital. Avoid bathtubs, hot tubs or swimming pools during 1st 2 weeks.  It is important to be mobile during your recovery period. Avoid sitting in one position for longer than 45 minutes to 1 hour. Walking and climbing stairs are OK.  Be careful not to overdo it.  Avoid any heavy lifting or strenuous activity for the first 2 weeks.  Most patients ease into normal activities (including employment) after 2 weeks of recuperation.  Most patients resume driving by the 2nd week. Please use your best judgment.      CATHETER CARE  Keep your “Lopez” urinary catheter below the level of your bladder at all times otherwise it may not drain properly.  The leg bag can be fitted under your trousers for daytime use. The larger overnight bag will hold more urine and is best reserved for bedtime use.  Minimal care of this unit is  catheter is not perfect, but most of the urine should flow through the catheter into the drainage bag.  Bladder spasms.  It is not uncommon with the catheter in and even after the catheter comes out to have bladder spasms.  You may feel mild to severe bladder pain or cramping.  You may also experience the sudden urge to urinate or a burning sensation when you urinate.  Call your MD if this persists without relief.  Lower legs/ankle swelling.  This is not abnormal with it occurs in both legs and should not alarm you.  It should resolve in about 7 - 14 days.  Elevation of your legs while sitting will help.        CONTACT MD IMMEDIATELY IF YOU ARE EXPERIENCING ANY OF THE FOLLOWING SYMPTOMS:    Oral Temperature over 101° F.  Urine stops draining from Lopez into drainage bag.  Any pain not relieved by pain medication prescribed.  Nausea/Vomiting.  Large amount of blood clots in urine that are blocking the catheter from draining.  Bladder spasms that are not relieved with pain medication.  Uneven swelling of legs or ankles.  Redness and/or large amount of swelling around your incision sites.  Excessive bleeding or drainage from your incision sites.        [facility]  [phone]

## 2024-01-16 NOTE — CONSULTS
Consultation report     Date of Service:  1/16/2024  Primary Care Provider: Huang Gibson MD  Source of information: The patient and Chart review    Chief Complaint: No chief complaint on file.      History of Presenting Illness:   Simba Quintero is a 78 y.o. male admitted to the urology service status post partial cystectomy for bladder cancer, hospitalist service requested to see  to assist with postoperative medical management.  Patient is alert and oriented x 3, seen after he is transferred to the floor on 5 E.  I have reviewed the EMR prior to visiting the patient  Patient has PMH significant for A-fib status post cardioversion anticoagulated with apixaban which has stopped 5 days prior to today's surgery, anxiety, CAD, COPD, hypertension, hyperlipidemia, memory disorder.    He currently is lying in bed on oxygen via nasal cannula, he denies shortness of breath or chest pain, headache fever he denies fever, chills, nausea or vomiting.  He denies abdominal pain, dysuria.  Has a Lopez catheter in place draining clear yellow urine.  Labs reviewed, CBC normal  Creatinine 1.39 compared to a normal creatinine 5 days ago.  Home medications reviewed he is on mirtazapine, pregabalin, eplerenone, metoprolol succinate, amlodipine, rosuvastatin, aspirin, apixaban, oxybutynin, mirabegron.       REVIEW OF SYSTEMS:  A comprehensive review of systems was negative except for that written in the History of Present Illness.     Past Medical History:   Diagnosis Date    A-fib (HCC)     Acute pneumonia 2022    Allergic rhinitis     Anticoagulant long-term use     Anxiety     Arthritis     Atrial fibrillation (HCC)     Back pain     Bladder cancer (HCC)     CAD (coronary artery disease)     Cancer (HCC) 2019    Basal cell - face    Chronic back pain     Chronic pain lower back    plus many other joints    COPD (chronic obstructive pulmonary disease) (HCC)     Depression     Fatigue     History of basal cell carcinoma  available resources at the time of admission. Route is PO if not otherwise noted.     Family and social history were personally reviewed, all pertinent and relevant details are outlined as above.    Objective:   /65   Pulse 80   Temp 98.2 °F (36.8 °C) (Oral)   Resp 18   Ht 1.88 m (6' 2\")   Wt 124.6 kg (274 lb 9.6 oz)   SpO2 97%   BMI 35.26 kg/m²         PHYSICAL EXAM:   General: This is a pleasant elderly gentleman who is lying in bed comfortably.  HEENT: PEERL, EOMI, moist mucus membranes  Neck: Supple, no JVD, no meningeal signs  Chest: On oxygen via nasal collar.  Clear to auscultation bilaterally   CVS: RRR, S1 S2 heard, no murmurs/rubs/gallops  Abd: Soft, non-tender, non-distended, +bowel sounds.  Lopez catheter in place draining clear ER urine  Ext: No clubbing, no cyanosis, no edema  Neuro/Psych: Pleasant mood and affect, CN 2-12 grossly intact, sensory grossly within normal limit, Strength 5/5 in all extremities, DTR 1+ x 4  Cap refill: Brisk, less than 3 seconds  Pulses: 2+, symmetric in all extremities  Skin: Warm, dry, without rashes or lesions    Data Review:   I have independently reviewed and interpreted patient's lab and all other diagnostic data    Abnormal Labs Reviewed   BASIC METABOLIC PANEL - Abnormal; Notable for the following components:       Result Value    Glucose 180 (*)     Creatinine 1.39 (*)     Est, Glom Filt Rate 52 (*)     Calcium 8.2 (*)     All other components within normal limits       [unfilled]    IMAGING:   No orders to display        ECG/ECHO:  [unfilled]       Notes reviewed from all clinical/nonclinical/nursing services involved in patient's clinical care. Care coordination discussions were held with appropriate clinical/nonclinical/ nursing providers based on care coordination needs.     Assessment and plan   Given the patient's current clinical presentation, there is a high level of concern for decompensation if discharged from the emergency

## 2024-01-16 NOTE — BRIEF OP NOTE
Brief Postoperative Note      Patient: Simba Quintero  YOB: 1945  MRN: [id number]    Date of Procedure: 1/16/2024    Pre-Op Diagnosis Codes:     * Malignant neoplasm of urinary bladder, unspecified site (HCC) [C67.9]    Post-Op Diagnosis: Same       Procedure(s):  ROBOTIC LAPAROSCOPIC PARTIAL CYSTECTOMY WITH PELVIC LYMPH NODE DISSECTION AND CYSTOSCOPY (E R A S)    Surgeon(s):  Jose Eduardo Mccain MD    Assistant Surgeon:  Zain Best II, MD    Assistant:  Surgical Assistant: Makenzie Anthony    Anesthesia: General    Estimated Blood Loss (mL): less than 50     Complications: None    Specimens:   ID Type Source Tests Collected by Time Destination   1 : inferior margin of bladder to rule out cancer Tissue Bladder SURGICAL PATHOLOGY Jose Eduardo Mccain MD 1/16/2024 1004    2 : right pelvic lymph node Tissue Lymph Node SURGICAL PATHOLOGY Jose Eduardo Mccain MD 1/16/2024 1036    3 : left pelvic lymph node Tissue Lymph Node SURGICAL PATHOLOGY Jose Eduardo Mccain MD 1/16/2024 1037    4 : PARTIAL CYSTECTOMY SURTURE AT SUPERIOR MARGIN Tissue Bladder SURGICAL PATHOLOGY Jose Eduardo Mccain MD 1/16/2024 1038        Implants:  * No implants in log *      Drains:   Closed/Suction Drain Right;Medial;Lateral Abdomen Bulb (Active)       Urinary Catheter 01/16/24 Lopez (Active)   $ Urethral catheter insertion Inserted for procedure 01/16/24 1033   Catheter Indications Perioperative use for selected surgical procedures 01/16/24 1033   Collection Container Standard 01/16/24 1033   Catheter Best Practices  Tamper seal intact;Bag below bladder;Bag not on floor;Lack of dependent loop in tubing;Drainage bag less than half full 01/16/24 1033   Status Draining;Patent 01/16/24 1033   Discontinuation Reason Per provider order 01/16/24 1033       [REMOVED] Urinary Catheter 01/16/24 Lopez (Removed)   $ Urethral catheter insertion Inserted for procedure 01/16/24 0750   Catheter Indications Perioperative use for selected surgical procedures

## 2024-01-16 NOTE — ANESTHESIA POSTPROCEDURE EVALUATION
Department of Anesthesiology  Postprocedure Note    Patient: Simba Quintero  MRN: [id number]  YOB: 1945  Date of evaluation: 1/16/2024    Procedure Summary     Date: 01/16/24 Room / Location: [facility] MAIN OR  / [facility] MAIN OR    Anesthesia Start: 0730 Anesthesia Stop: 1149    Procedure: ROBOTIC LAPAROSCOPIC PARTIAL CYSTECTOMY WITH PELVIC LYMPH NODE DISSECTION AND CYSTOSCOPY (E R A S) (Abdomen/Perineum) Diagnosis:       Malignant neoplasm of urinary bladder, unspecified site (HCC)      (Malignant neoplasm of urinary bladder, unspecified site (HCC) [C67.9])    Providers: Jose Eduardo Mccain MD Responsible Provider: Herbert Moody MD    Anesthesia Type: General ASA Status: 3          Anesthesia Type: General    Elli Phase I: Elli Score: 8    Elli Phase II:      Anesthesia Post Evaluation   Post-Anesthesia Evaluation and Assessment    Patient: Simba Quintero MRN: [id number]  SSN: [id number]    YOB: 1945  Age: 78 y.o.  Sex: male      I have evaluated the patient and they are stable and ready for discharge from the PACU.     Cardiovascular Function/Vital Signs  Visit Vitals  /65   Pulse 85   Temp 97.7 °F (36.5 °C) (Oral)   Resp 13   Ht 1.88 m (6' 2\")   Wt 124.6 kg (274 lb 9.6 oz)   SpO2 95%   BMI 35.26 kg/m²       Patient is status post General anesthesia for Procedure(s):  ROBOTIC LAPAROSCOPIC PARTIAL CYSTECTOMY WITH PELVIC LYMPH NODE DISSECTION AND CYSTOSCOPY (E R A S).    Nausea/Vomiting: None    Postoperative hydration reviewed and adequate.    Pain:      Managed    Neurological Status:       At baseline    Mental Status, Level of Consciousness: Alert and  oriented to person, place, and time    Pulmonary Status:       Adequate oxygenation and airway patent    Complications related to anesthesia: None    Post-anesthesia assessment completed. No concerns    Signed By: Herbert Moody MD     January 16, 2024                No notable events documented.

## 2024-01-16 NOTE — OP NOTE
[facility]  OPERATIVE REPORT    Name:  WINLER AMBRIZ  MR#:  [id number]  :  1945  ACCOUNT #:  [id number]  DATE OF SERVICE:  2024    PREOPERATIVE DIAGNOSIS:  Bladder cancer.    POSTOPERATIVE DIAGNOSIS:  Bladder cancer.    PROCEDURE PERFORMED:  Robot-assisted laparoscopic partial cystectomy with pelvic lymph node dissection and cystoscopy.    SURGEON:  Jose Eduardo Mccain MD    ASSISTANT:  1.  Dr. Zain Best  2.  Raj Banegas SA    ANESTHESIA:  General.    COMPLICATIONS:  None.    SPECIMENS REMOVED:  1.  Partial cystectomy specimen.  2.  Inferior margin.  3.  Right pelvic lymph node.  4.  Left pelvic lymph node.    IMPLANTS:  ***    ESTIMATED BLOOD LOSS:  Less than 50 mL.    FINDINGS:  No obvious bladder mass was seen.  The area of eschar was removed completely with clear margin under cystoscopic guidance.  Water tight closure.    INDICATIONS:  The patient is a 78-year-old male who had presented with gross hematuria and during workup, he was found to have a 2-cm right anterior bladder wall mass.  Initial resection was T1 high-grade urothelial carcinoma; however, because of the location of the tumor, we felt we were not able to resect the mass completely endoscopically.  Options were discussed, and he opted to undergo robotic-assisted partial cystectomy to remove the area of mass that was seen cystoscopically.    PROCEDURE:  After informed consent was obtained, he was brought to the OR and placed supine on the table.  General anesthesia was smoothly induced.  Multidisciplinary time-out was performed and perioperative antibiotic was administered.  He was in dorsal lithotomy.  He was secured nicely to the table, and all his pressure points were padded.  His abdomen and genitalia were prepped and draped in the standard sterile fashion.  We began the procedure by placing an 18-Estonian Lopez with return of clear urine.  Next, I changed gloves and began with the robotic portion.  Because of

## 2024-01-16 NOTE — PROGRESS NOTES
Trospium (Sanctura) 20 mg daily interchanged for Myrbetriq 25 mg daily per [facility] therapeutic interchange policy

## 2024-01-16 NOTE — PROGRESS NOTES
Pharmacist Review and Automatic Dose Adjustment of Prophylactic Enoxaparin    *Review reason for admission/hospital problem list*    The reviewing pharmacist has made an adjustment to the ordered enoxaparin dose or converted to UFH per the approved [facility] protocol and table as identified below.        Simba Quintero is a 78 y.o. male.     Recent Labs     01/16/24  1154   CREATININE 1.39*       Estimated Creatinine Clearance: 62 mL/min (A) (based on SCr of 1.39 mg/dL (H)).    Height:   Ht Readings from Last 1 Encounters:   01/16/24 1.88 m (6' 2\")     Weight:  Wt Readings from Last 1 Encounters:   01/16/24 124.6 kg (274 lb 9.6 oz)               Plan: Based upon the patient's weight (124.6 kg) and renal function (CrCl ~ 62 ml/min), the ordered enoxaparin dose of 40 mg SQ q 24 h has been changed to 30 mg SQ q 12 h      Thank you,  FUAZIA HOFFMAN, [facility] MS

## 2024-01-16 NOTE — CARE COORDINATION
Care Management Initial Assessment       RUR: 10% Low   Readmission? No  1st IM letter given? Yes - 1/16/24  1st  letter given: No    CM met with patient and wife at bedside to introduce self and explain role. Patient lives with his wife in a 2 story home with 6 steps to enter. Patient reports that he does have a basement that has a stair chair lift. Patient was independent with ADL's, IADL's and ambulation at baseline. Patient owns a cane, RW, rollator, BSC and WC. Patient reports having HH PT history with [facility]; prefers to utilize their HH services if recommended. Patient's wife will provide transport home once medically stable. CM will continue to follow as needed.      01/16/24 3337   Service Assessment   Patient Orientation Alert and Oriented;Person;Situation;Place;Self   Cognition Alert   History Provided By Patient   Primary Caregiver Self   Accompanied By/Relationship Wife   Support Systems Spouse/Significant Other   Patient's Healthcare Decision Maker is: Legal Next of Kin   PCP Verified by CM Yes  (Dr.Sidney Gibson)   Last Visit to PCP Within last 3 months   Prior Functional Level Independent in ADLs/IADLs   Can patient return to prior living arrangement Yes   Ability to make needs known: Good   Family able to assist with home care needs: Yes   Financial Resources Medicare   Community Resources None   Social/Functional History   Lives With Spouse   Type of Home House   Home Layout Two level   Home Equipment Cane;Rollator;Wheelchair-manual;Cane, quad;Lift chair   ADL Assistance Independent   Homemaking Assistance Independent   Ambulation Assistance Independent   Transfer Assistance Independent   Discharge Planning   Type of Residence House   Living Arrangements Spouse/Significant Other   Current Services Prior To Admission Durable Medical Equipment   Potential Assistance Purchasing Medications No   Patient expects to be discharged to: [facility]   [phone]

## 2024-01-16 NOTE — PERIOP NOTE
Telephone order with read back given by Dr. Moody for 25 mcg of IV Fentanyl every 5 minutes PRN for 2 doses.

## 2024-01-17 VITALS
OXYGEN SATURATION: 94 % | SYSTOLIC BLOOD PRESSURE: 143 MMHG | DIASTOLIC BLOOD PRESSURE: 78 MMHG | BODY MASS INDEX: 35.24 KG/M2 | RESPIRATION RATE: 18 BRPM | HEART RATE: 80 BPM | TEMPERATURE: 98.2 F | HEIGHT: 74 IN | WEIGHT: 274.6 LBS

## 2024-01-17 LAB
ANION GAP SERPL CALC-SCNC: 6 MMOL/L (ref 5–15)
BUN SERPL-MCNC: 13 MG/DL (ref 6–20)
BUN/CREAT SERPL: 14 (ref 12–20)
CALCIUM SERPL-MCNC: 8.7 MG/DL (ref 8.5–10.1)
CHLORIDE SERPL-SCNC: 110 MMOL/L (ref 97–108)
CO2 SERPL-SCNC: 24 MMOL/L (ref 21–32)
CREAT FLD-MCNC: 0.75 MG/DL
CREAT SERPL-MCNC: 0.93 MG/DL (ref 0.7–1.3)
ERYTHROCYTE [DISTWIDTH] IN BLOOD BY AUTOMATED COUNT: 13.9 % (ref 11.5–14.5)
GLUCOSE SERPL-MCNC: 145 MG/DL (ref 65–100)
HCT VFR BLD AUTO: 38 % (ref 36.6–50.3)
HGB BLD-MCNC: 12.4 G/DL (ref 12.1–17)
MCH RBC QN AUTO: 33.3 PG (ref 26–34)
MCHC RBC AUTO-ENTMCNC: 32.6 G/DL (ref 30–36.5)
MCV RBC AUTO: 102.2 FL (ref 80–99)
NRBC # BLD: 0 K/UL (ref 0–0.01)
NRBC BLD-RTO: 0 PER 100 WBC
PLATELET # BLD AUTO: 135 K/UL (ref 150–400)
PMV BLD AUTO: 9.7 FL (ref 8.9–12.9)
POTASSIUM SERPL-SCNC: 4.5 MMOL/L (ref 3.5–5.1)
RBC # BLD AUTO: 3.72 M/UL (ref 4.1–5.7)
SODIUM SERPL-SCNC: 140 MMOL/L (ref 136–145)
SPECIMEN SOURCE FLD: NORMAL
WBC # BLD AUTO: 8.5 K/UL (ref 4.1–11.1)

## 2024-01-17 PROCEDURE — 97535 SELF CARE MNGMENT TRAINING: CPT

## 2024-01-17 PROCEDURE — 36415 COLL VENOUS BLD VENIPUNCTURE: CPT

## 2024-01-17 PROCEDURE — 6370000000 HC RX 637 (ALT 250 FOR IP): Performed by: UROLOGY

## 2024-01-17 PROCEDURE — 82570 ASSAY OF URINE CREATININE: CPT

## 2024-01-17 PROCEDURE — 6370000000 HC RX 637 (ALT 250 FOR IP): Performed by: NURSE PRACTITIONER

## 2024-01-17 PROCEDURE — 97165 OT EVAL LOW COMPLEX 30 MIN: CPT

## 2024-01-17 PROCEDURE — 6360000002 HC RX W HCPCS: Performed by: UROLOGY

## 2024-01-17 PROCEDURE — 2580000003 HC RX 258: Performed by: UROLOGY

## 2024-01-17 PROCEDURE — 85027 COMPLETE CBC AUTOMATED: CPT

## 2024-01-17 PROCEDURE — 97530 THERAPEUTIC ACTIVITIES: CPT

## 2024-01-17 PROCEDURE — 80048 BASIC METABOLIC PNL TOTAL CA: CPT

## 2024-01-17 RX ORDER — CIPROFLOXACIN 500 MG/1
500 TABLET, FILM COATED ORAL 2 TIMES DAILY
Qty: 16 TABLET | Refills: 0 | Status: SHIPPED | OUTPATIENT
Start: 2024-01-17 | End: 2024-01-25

## 2024-01-17 RX ORDER — LEVOFLOXACIN 250 MG/1
250 TABLET, FILM COATED ORAL ONCE
Status: COMPLETED | OUTPATIENT
Start: 2024-01-17 | End: 2024-01-17

## 2024-01-17 RX ORDER — DOCUSATE SODIUM 100 MG/1
100 CAPSULE, LIQUID FILLED ORAL 2 TIMES DAILY
Qty: 60 CAPSULE | Refills: 0 | Status: SHIPPED | OUTPATIENT
Start: 2024-01-17 | End: 2024-02-16

## 2024-01-17 RX ADMIN — ACETAMINOPHEN 1000 MG: 500 TABLET ORAL at 08:42

## 2024-01-17 RX ADMIN — ACETAMINOPHEN 1000 MG: 500 TABLET ORAL at 04:01

## 2024-01-17 RX ADMIN — ASPIRIN 81 MG: 81 TABLET, COATED ORAL at 08:41

## 2024-01-17 RX ADMIN — AMLODIPINE BESYLATE 7.5 MG: 5 TABLET ORAL at 08:41

## 2024-01-17 RX ADMIN — ENOXAPARIN SODIUM 30 MG: 100 INJECTION SUBCUTANEOUS at 08:43

## 2024-01-17 RX ADMIN — POLYMYXIN B SULFATE, BACITRACIN ZINC, NEOMYCIN SULFATE: 5000; 3.5; 4 OINTMENT TOPICAL at 08:42

## 2024-01-17 RX ADMIN — TROSPIUM CHLORIDE 20 MG: 20 TABLET, FILM COATED ORAL at 08:41

## 2024-01-17 RX ADMIN — LEVOFLOXACIN 250 MG: 250 TABLET, FILM COATED ORAL at 17:09

## 2024-01-17 RX ADMIN — ACETAMINOPHEN 1000 MG: 500 TABLET ORAL at 17:08

## 2024-01-17 RX ADMIN — SODIUM CHLORIDE, POTASSIUM CHLORIDE, SODIUM LACTATE AND CALCIUM CHLORIDE: 600; 310; 30; 20 INJECTION, SOLUTION INTRAVENOUS at 02:22

## 2024-01-17 ASSESSMENT — PAIN SCALES - GENERAL
PAINLEVEL_OUTOF10: 0
PAINLEVEL_OUTOF10: 3

## 2024-01-17 ASSESSMENT — PAIN DESCRIPTION - LOCATION: LOCATION: ABDOMEN

## 2024-01-17 NOTE — PROGRESS NOTES
Consult progress note.  Gilma James MD  Date of service:01/17/24        History of Presenting Illness:   Simba Quintero is a 78 y.o. male admitted to the urology service status post partial cystectomy for bladder cancer, hospitalist service requested to see  to assist with postoperative medical management.  Patient is alert and oriented x 3, seen after he is transferred to the floor on 5 E.  I have reviewed the EMR prior to visiting the patient  Patient has PMH significant for A-fib status post cardioversion anticoagulated with apixaban which has stopped 5 days prior to today's surgery, anxiety, CAD, COPD, hypertension, hyperlipidemia, memory disorder.    He currently is lying in bed on oxygen via nasal cannula, he denies shortness of breath or chest pain, headache fever he denies fever, chills, nausea or vomiting.  He denies abdominal pain, dysuria.  Has a Lopez catheter in place draining clear yellow urine.  Labs reviewed, CBC normal  Creatinine 1.39 compared to a normal creatinine 5 days ago.  Home medications reviewed he is on mirtazapine, pregabalin, eplerenone, metoprolol succinate, amlodipine, rosuvastatin, aspirin, apixaban, oxybutynin, mirabegron.         Subjective/interval history  --Patient doing well.  His wife present in the room.  Discussed with urology NP, patient is being discharged this afternoon.  Assessment and plan   Given the patient's current clinical presentation, there is a high level of concern for decompensation if discharged from the emergency department. Complex decision making was performed, which includes reviewing the patient's available past medical records, laboratory results, and imaging studies.  JET, resolved  -- Continue eplerenone on discharge    Hypertension, hyperlipidemia, CAD, chronic A-fib, rate controlled.  -- Hold aspirin on due to elevated creatinine.  -- Continue amlodipine, Toprol, rosuvastatin, sputum.  -- Resume apixaban when okay with

## 2024-01-17 NOTE — DISCHARGE SUMMARY
Urology Discharge Summary    Patient: Simba Quintero MRN: [id number]  SSN: [id number]    YOB: 1945  Age: 78 y.o.  Sex: male               ADMISSION:  to Jose Eduardo Mccain MD by Jose Eduardo Mccain MD  1/16/2024 ADMISSION DIAGNOSIS: Malignant neoplasm of urinary bladder, unspecified site (HCC) [C67.9]  Bladder cancer (HCC) [C67.9]  1/17/2024 DISCHARGE DIAGNOSIS: [x]    Same  CONSULTS: Hospitalist  PROCEDURES: POD# 1 Day Post-Op Procedure(s):  ROBOTIC LAPAROSCOPIC PARTIAL CYSTECTOMY WITH PELVIC LYMPH NODE DISSECTION AND CYSTOSCOPY (E R A S)    RECENT LABS: @[employer]@     Hospitals in [place] COURSE: [x]    Uncomplicated.   Simba Quintero underwent Procedure(s):  ROBOTIC LAPAROSCOPIC PARTIAL CYSTECTOMY WITH PELVIC LYMPH NODE DISSECTION AND CYSTOSCOPY (E R A S) on 1/16/2024 without complications.  He had an uneventful recovery.  His activity was increased, his diet was advanced and his pain control was transitioned to oral medications.  He was discharged to home 1 Day Post-Op.    COMPLICATIONS: [x]    None identified  DISCHARGE TO:     [x]    Home  []    Rehab []    SNF  FOLLOWUP: 10-14 days   DISCHARGE MEDS:     [x]    IT IS INTENDED THAT YOU CONTINUE TO TAKE YOUR PRIOR MEDICATIONS WITHOUT CHANGES WITH THE EXCEPTION OF:  []    NONE    [unfilled]      ARLETTE Agrawal NP 1/17/2024 1:38 PM

## 2024-01-17 NOTE — PROGRESS NOTES
Physical Therapy    Order received, chart reviewed, evaluation deferred. Per OT note/discharge, pt is ambulating around unit without assistance suing his rollator per baseline functional mobility. Pt also demonstrated bed mobility at the supervision level with her. This author briefly spoke with pt and family member, bedside regarding positioning and gentle stretching for adhesion prevention (lowering HOB flat as tolerated with overhead reaching/postural extension, and IS use). Further educated pt on TID walks with assistance for IV pole/franco set-up as needed and hourly walking in his home at discharge. No concerns from pt for accessing home/completing stairs. Will D/C PT at this time and please re-consult with any additional needs or concerns should they arise prior to discharge.     Thank you.  DAVID CAMACHO, PT

## 2024-01-17 NOTE — PROGRESS NOTES
Occupational Therapy  1/17/2024    Orders received, chart reviewed and patient evaluated by occupational therapy. Pending progression with skilled acute occupational therapy, recommend:  No skilled occupational therapy    Recommend with nursing patient to complete as able in order to maintain strength, endurance and independence: OOB to chair 3x/day, ADLs with supervision/setup and mobilizing to the bathroom for toileting with SPV assist with use of rollator. Thank you for your assistance.     Full evaluation to follow.      Olivia Granado, RUKHSANA, OTR/L

## 2024-01-17 NOTE — PROGRESS NOTES
Patient alert and oriented x4 at time of discharge. IV and Fred drain removed. Discharge instructions and medications reviewed with patient and wife both verbalized understanding.  Lopez cathter teaching provided and patient switched to leg bag. Patient discharged to home with wife escorted to main entrance in wheel chair by PCT.

## 2024-01-17 NOTE — PROGRESS NOTES
OCCUPATIONAL THERAPY EVALUATION/DISCHARGE  Patient: Simba Quintero (78 y.o. male)  Date: 1/17/2024  Primary Diagnosis: Malignant neoplasm of urinary bladder, unspecified site (HCC) [C67.9]  Bladder cancer (HCC) [C67.9]  Procedure(s) (LRB):  ROBOTIC LAPAROSCOPIC PARTIAL CYSTECTOMY WITH PELVIC LYMPH NODE DISSECTION AND CYSTOSCOPY (E R A S) (N/A) 1 Day Post-Op     Precautions:                    ASSESSMENT :  Based on the objective data below, the patient presents at baseline LOF s/p partial cystectomy and lymph node dissection POD #1. Patient reports he has been doing laps around nurses station and up around room without difficulty using rollator. Patient able to complete LB dressing tasks and bedroom mobility with transfer to recliner chair with SPV at rollator level. Patient reports he is at baseline LOF for completing ADLs, no concerns with being able to complete ADLs at DC. Spouse present throughout evaluation and in agreement. Increased time require during session to allow for education on energy conservation, pain mgmt, and home safety topics. Will sign off at this time as further skilled acute occupational therapy is not indicated at this time.    Functional Outcome Measure:  The patient scored 23/24 on the [facility] outcome measure.         PLAN :  Recommend with staff: up with assistance for line mgmt.     Recommendation for discharge: (in order for the patient to meet his/her long term goals): No skilled occupational therapy    Other factors to consider for discharge: high risk for falls and hx of HHPT for neuropathy    IF patient discharges home will need the following DME: patient owns DME required for discharge     SUBJECTIVE:   Patient stated, “I dont think I need any help with this.”    OBJECTIVE DATA SUMMARY:     Past Medical History:   Diagnosis Date    A-fib (HCC)     Acute pneumonia 2022    Allergic rhinitis     Anticoagulant long-term use     Anxiety     Arthritis     Atrial fibrillation (HCC)

## 2024-01-17 NOTE — PROGRESS NOTES
Progress Note    Patient: Simba Quintero MRN: [id number]  SSN: [id number]    YOB: 1945  Age: 78 y.o.  Sex: male          ADMITTED:  2024 to Jose Eduardo Mccain MD  for Malignant neoplasm of urinary bladder, unspecified site (HCC) [C67.9]  Bladder cancer (HCC) [C67.9]           Simba Quintero is 1 Day Post-Op Procedure(s):  ROBOTIC LAPAROSCOPIC PARTIAL CYSTECTOMY WITH PELVIC LYMPH NODE DISSECTION AND CYSTOSCOPY (E R A S).  He is doing well. AFVSS.  He has no pain.  He has no nausea. Denies flatus.  He is tolerating clear liquids. Indwelling catheter is draining well.  Good urine output and PO intake. He has been up walking in the halls and tolerated well. No CP, cough, SOB, or BLE pain.       Vitals:  Temp (24hrs), Av.2 °F (36.8 °C), Min:97.7 °F (36.5 °C), Max:98.8 °F (37.1 °C)     Blood pressure 127/66, pulse 80, temperature 98.1 °F (36.7 °C), temperature source Oral, resp. rate 18, height 1.88 m (6' 2\"), weight 124.6 kg (274 lb 9.6 oz), SpO2 92 %.      I&O's:  01/15 1901 -  0700  In: 2720 [P.O.:320; I.V.:1900]  Out: 4070 [Urine:3890; Drains:130]   No intake/output data recorded.     Exam:   Alert, NAD, abdomen soft, appropriately TTP at surgical sites.  All incisions clean/dry/intact without evidence of infection.  FAYE with serosanguinous drainage.  Lopez with clear urine output.     Labs:   Recent Labs     24  1154 24  0411   WBC  --  8.5   HGB 12.7 12.4   HCT 37.4 38.0   PLT  --  135*     Recent Labs     24  1154 24  0411    140   K 4.4 4.5    110*   CO2 25 24   BUN 20 13   Cr 0.93  FAYE Cr 0.75     Cultures:        Imaging:       Assessment:     - 1 Day Post-Op Procedure(s):  ROBOTIC LAPAROSCOPIC PARTIAL CYSTECTOMY WITH PELVIC LYMPH NODE DISSECTION AND CYSTOSCOPY (E R A S)    Principal Problem:    Bladder cancer (HCC)  Resolved Problems:    * No resolved hospital problems. *      Plan:     - Progressing well.  Procedures     DATE: February26th, 2020    TIME: 4:30 PM    PROCEDURE: Endometrial biopsy    INDICATION: abnormal uterine bleeding    PATIENT CONSENT:      PRE ENDOMETRIAL BIOPSY COUNSELING:   The patient was informed of the risk of bleeding, infection, uterine perforation and  pain and that the test will rule-out endometrial cancer with accuracy greater than   95%. She was counseled on the alternatives to endometrial biopsy.  All of her   questions were answered.  Patient gives written consent    ANESTHESIA: None    Labs: UPT performed prior to the procedure was negative.    PROCEDURE NOTE:  The cervix was visualized with a speculum and swabbed with Betadinex3.  The anterior lip of the cervix was grasped with a single toothed tenaculum, and a sterile endometrial pipelle was inserted into the uterus to a sound length of 9 cm. 2 passes were made with the pipelle and heavy amount of tissue was obtained. The specimen was placed in formalin and sent to Pathology for evaluation.     COMPLICATIONS: None    DISPOSITION: The patient tolerated the above procedure well.      POST ENDOMETRIAL BIOPSY COUNSELING:  - Manage post biopsy cramping with NSAIDs or Tylenol.  - Expect spotting or light bleeding for a few days.  - Report bleeding heavier than a period, fever > 101.0 F, worsening pain or a foul  smelling vaginal discharge.      Adwoa Ann NP 02/26/2020 4:51 PM       Advance diet. Continue to mobilize. Will re-eval later today for possible dc home.     1:30 PM  Patient re-evaluated, continues to do well. Tolerating regular diet. DC FAYE drain. DC home with salvador. DRUGA teaching reviewed and scripts sent.     Signed By: ARLETTE Agrawal - NP - January 17, 2024

## 2024-02-14 RX ORDER — ROSUVASTATIN CALCIUM 10 MG/1
TABLET, COATED ORAL
Qty: 90 TABLET | Refills: 0 | Status: SHIPPED | OUTPATIENT
Start: 2024-02-14

## 2024-02-23 DIAGNOSIS — M47.26 OTHER SPONDYLOSIS WITH RADICULOPATHY, LUMBAR REGION: ICD-10-CM

## 2024-02-25 RX ORDER — PREGABALIN 50 MG/1
50 CAPSULE ORAL 2 TIMES DAILY
Qty: 60 CAPSULE | Refills: 1 | Status: SHIPPED | OUTPATIENT
Start: 2024-02-25 | End: 2024-04-25

## 2024-05-02 DIAGNOSIS — M47.26 OTHER SPONDYLOSIS WITH RADICULOPATHY, LUMBAR REGION: ICD-10-CM

## 2024-05-02 RX ORDER — PREGABALIN 50 MG/1
CAPSULE ORAL
Qty: 60 CAPSULE | Refills: 1 | Status: SHIPPED | OUTPATIENT
Start: 2024-05-02 | End: 2024-07-01

## 2024-05-02 NOTE — TELEPHONE ENCOUNTER
Chief Complaint   Patient presents with    Medication Refill     Last Appointment with Dr. Huang Gibson:  1/4/2024   Future Appointments   Date Time Provider Department Center   7/9/2024  9:45 AM Huang Gibson MD [facility]

## 2024-05-17 RX ORDER — ROSUVASTATIN CALCIUM 10 MG/1
TABLET, COATED ORAL
Qty: 90 TABLET | Refills: 0 | Status: SHIPPED | OUTPATIENT
Start: 2024-05-17

## 2024-07-04 DIAGNOSIS — M47.26 OTHER SPONDYLOSIS WITH RADICULOPATHY, LUMBAR REGION: ICD-10-CM

## 2024-07-05 RX ORDER — PREGABALIN 50 MG/1
CAPSULE ORAL
Qty: 60 CAPSULE | Refills: 1 | Status: SHIPPED | OUTPATIENT
Start: 2024-07-05 | End: 2024-09-03

## 2024-07-09 ENCOUNTER — OFFICE VISIT (OUTPATIENT)
Age: 79
End: 2024-07-09
Payer: MEDICARE

## 2024-07-09 VITALS
HEIGHT: 74 IN | BODY MASS INDEX: 34.52 KG/M2 | RESPIRATION RATE: 15 BRPM | OXYGEN SATURATION: 95 % | HEART RATE: 95 BPM | SYSTOLIC BLOOD PRESSURE: 134 MMHG | DIASTOLIC BLOOD PRESSURE: 78 MMHG | WEIGHT: 269 LBS | TEMPERATURE: 97.8 F

## 2024-07-09 DIAGNOSIS — K51.319: ICD-10-CM

## 2024-07-09 DIAGNOSIS — R42 DIZZINESS: Primary | ICD-10-CM

## 2024-07-09 DIAGNOSIS — I10 ESSENTIAL (PRIMARY) HYPERTENSION: ICD-10-CM

## 2024-07-09 DIAGNOSIS — M48.061 SPINAL STENOSIS OF LUMBAR REGION, UNSPECIFIED WHETHER NEUROGENIC CLAUDICATION PRESENT: ICD-10-CM

## 2024-07-09 DIAGNOSIS — E78.2 MIXED HYPERLIPIDEMIA: ICD-10-CM

## 2024-07-09 DIAGNOSIS — I48.0 PAROXYSMAL ATRIAL FIBRILLATION (HCC): ICD-10-CM

## 2024-07-09 PROCEDURE — 99214 OFFICE O/P EST MOD 30 MIN: CPT | Performed by: INTERNAL MEDICINE

## 2024-07-09 PROCEDURE — 3078F DIAST BP <80 MM HG: CPT | Performed by: INTERNAL MEDICINE

## 2024-07-09 PROCEDURE — 1036F TOBACCO NON-USER: CPT | Performed by: INTERNAL MEDICINE

## 2024-07-09 PROCEDURE — 1123F ACP DISCUSS/DSCN MKR DOCD: CPT | Performed by: INTERNAL MEDICINE

## 2024-07-09 PROCEDURE — G8427 DOCREV CUR MEDS BY ELIG CLIN: HCPCS | Performed by: INTERNAL MEDICINE

## 2024-07-09 PROCEDURE — G8417 CALC BMI ABV UP PARAM F/U: HCPCS | Performed by: INTERNAL MEDICINE

## 2024-07-09 PROCEDURE — 3074F SYST BP LT 130 MM HG: CPT | Performed by: INTERNAL MEDICINE

## 2024-07-09 RX ORDER — DOCUSATE SODIUM 100 MG/1
100 CAPSULE, LIQUID FILLED ORAL 2 TIMES DAILY
Qty: 60 CAPSULE | Refills: 0 | COMMUNITY
Start: 2024-07-09 | End: 2024-08-08

## 2024-07-09 RX ORDER — FINASTERIDE 5 MG/1
5 TABLET, FILM COATED ORAL DAILY
COMMUNITY

## 2024-07-09 RX ORDER — TAMSULOSIN HYDROCHLORIDE 0.4 MG/1
0.4 CAPSULE ORAL DAILY
COMMUNITY

## 2024-07-09 RX ORDER — PREGABALIN 75 MG/1
75 CAPSULE ORAL 2 TIMES DAILY
Qty: 180 CAPSULE | Refills: 1 | Status: SHIPPED | OUTPATIENT
Start: 2024-07-09 | End: 2025-01-05

## 2024-07-09 NOTE — PROGRESS NOTES
HPI:  Simba Quintero is a 78 y.o. year old male who is here for a follow-up visit.  His main complaint is of issues with dizziness.  He notes it mostly when he goes to stand up or when he is moving around.  He denies any true vertigo.  Does not feel dizzy when he is lying down.  No fevers or chills.  No sweats.  No nausea or vomiting.  No change in vision or hearing.  He does have episodes of atrial fibs with faster heart rates that do not correlate to his symptoms.  He does have some shortness of breath with exertion.  Some increased pain in his feet.  Some ongoing issues with lower back discomfort.      Past Medical History:   Diagnosis Date    A-fib (HCC)     Acute pneumonia 2022    Allergic rhinitis     Anticoagulant long-term use     Anxiety     Arthritis     Atrial fibrillation (HCC)     Back pain     Bladder cancer (HCC)     CAD (coronary artery disease)     Cancer (HCC) 2019    Basal cell - face    Chronic back pain     Chronic pain lower back    plus many other joints    COPD (chronic obstructive pulmonary disease) (HCC)     Depression     Fatigue     History of basal cell carcinoma (BCC) of skin 07/01/2019    left side of face    Hyperlipidemia     Hypertension     Hypogonadism male     Memory disorder     Muscle weakness     Neuropathy     Obesity     Right bundle branch block     PER CARIOLOGY NOTES    Sleep apnea     Ulcerative colitis (HCC)     Unspecified essential hypertension 01/20/2010    Urinary incontinence     Vertigo        Past Surgical History:   Procedure Laterality Date    BLADDER SURGERY N/A 1/16/2024    ROBOTIC LAPAROSCOPIC PARTIAL CYSTECTOMY WITH PELVIC LYMPH NODE DISSECTION AND CYSTOSCOPY (E R A S) performed by Jose Eduardo Mccain MD at [facility] MAIN OR    BOWEL RESECTION      2021    COLONOSCOPY  02/07/2017    3 yr f/u - Dr. Echevarria    EYE SURGERY      MOHS SURGERY Left 07/01/2019    basal cell carcinoma left side of face.    SKIN BIOPSY      TONSILLECTOMY      WISDOM TOOTH EXTRACTION

## 2024-07-15 DIAGNOSIS — E78.2 MIXED HYPERLIPIDEMIA: ICD-10-CM

## 2024-07-15 DIAGNOSIS — I48.0 PAROXYSMAL ATRIAL FIBRILLATION (HCC): ICD-10-CM

## 2024-07-15 LAB
ALBUMIN SERPL-MCNC: 4 G/DL (ref 3.5–5)
ALBUMIN/GLOB SERPL: 1.3 (ref 1.1–2.2)
ALP SERPL-CCNC: 89 U/L (ref 45–117)
ALT SERPL-CCNC: 24 U/L (ref 12–78)
ANION GAP SERPL CALC-SCNC: 11 MMOL/L (ref 5–15)
AST SERPL-CCNC: 22 U/L (ref 15–37)
BASOPHILS # BLD: 0 K/UL (ref 0–0.1)
BASOPHILS NFR BLD: 1 % (ref 0–1)
BILIRUB SERPL-MCNC: 0.6 MG/DL (ref 0.2–1)
BUN SERPL-MCNC: 13 MG/DL (ref 6–20)
BUN/CREAT SERPL: 12 (ref 12–20)
CALCIUM SERPL-MCNC: 9.8 MG/DL (ref 8.5–10.1)
CHLORIDE SERPL-SCNC: 104 MMOL/L (ref 97–108)
CHOLEST SERPL-MCNC: 160 MG/DL
CO2 SERPL-SCNC: 22 MMOL/L (ref 21–32)
CREAT SERPL-MCNC: 1.1 MG/DL (ref 0.7–1.3)
DIFFERENTIAL METHOD BLD: ABNORMAL
EOSINOPHIL # BLD: 0.3 K/UL (ref 0–0.4)
EOSINOPHIL NFR BLD: 4 % (ref 0–7)
ERYTHROCYTE [DISTWIDTH] IN BLOOD BY AUTOMATED COUNT: 13.3 % (ref 11.5–14.5)
GLOBULIN SER CALC-MCNC: 3.2 G/DL (ref 2–4)
GLUCOSE SERPL-MCNC: 150 MG/DL (ref 65–100)
HCT VFR BLD AUTO: 43 % (ref 36.6–50.3)
HDLC SERPL-MCNC: 48 MG/DL
HDLC SERPL: 3.3 (ref 0–5)
HGB BLD-MCNC: 15.1 G/DL (ref 12.1–17)
IMM GRANULOCYTES # BLD AUTO: 0 K/UL (ref 0–0.04)
IMM GRANULOCYTES NFR BLD AUTO: 1 % (ref 0–0.5)
LDLC SERPL CALC-MCNC: 84.6 MG/DL (ref 0–100)
LYMPHOCYTES # BLD: 1.7 K/UL (ref 0.8–3.5)
LYMPHOCYTES NFR BLD: 24 % (ref 12–49)
MCH RBC QN AUTO: 34.4 PG (ref 26–34)
MCHC RBC AUTO-ENTMCNC: 35.1 G/DL (ref 30–36.5)
MCV RBC AUTO: 97.9 FL (ref 80–99)
MONOCYTES # BLD: 0.6 K/UL (ref 0–1)
MONOCYTES NFR BLD: 9 % (ref 5–13)
NEUTS SEG # BLD: 4.5 K/UL (ref 1.8–8)
NEUTS SEG NFR BLD: 61 % (ref 32–75)
NRBC # BLD: 0 K/UL (ref 0–0.01)
NRBC BLD-RTO: 0 PER 100 WBC
PLATELET # BLD AUTO: 149 K/UL (ref 150–400)
PMV BLD AUTO: 10.6 FL (ref 8.9–12.9)
POTASSIUM SERPL-SCNC: 4.6 MMOL/L (ref 3.5–5.1)
PROT SERPL-MCNC: 7.2 G/DL (ref 6.4–8.2)
RBC # BLD AUTO: 4.39 M/UL (ref 4.1–5.7)
SODIUM SERPL-SCNC: 137 MMOL/L (ref 136–145)
TRIGL SERPL-MCNC: 137 MG/DL
TSH SERPL DL<=0.05 MIU/L-ACNC: 1.18 UIU/ML (ref 0.36–3.74)
VLDLC SERPL CALC-MCNC: 27.4 MG/DL
WBC # BLD AUTO: 7.2 K/UL (ref 4.1–11.1)

## 2024-07-16 LAB
EST. AVERAGE GLUCOSE BLD GHB EST-MCNC: 100 MG/DL
HBA1C MFR BLD: 5.1 % (ref 4–5.6)

## 2024-07-29 ENCOUNTER — OFFICE VISIT (OUTPATIENT)
Age: 79
End: 2024-07-29
Payer: MEDICARE

## 2024-07-29 VITALS
HEART RATE: 109 BPM | BODY MASS INDEX: 34.78 KG/M2 | WEIGHT: 271 LBS | SYSTOLIC BLOOD PRESSURE: 126 MMHG | OXYGEN SATURATION: 96 % | DIASTOLIC BLOOD PRESSURE: 74 MMHG | HEIGHT: 74 IN

## 2024-07-29 DIAGNOSIS — I10 ESSENTIAL HYPERTENSION: ICD-10-CM

## 2024-07-29 DIAGNOSIS — I35.8 AORTIC VALVE SCLEROSIS: ICD-10-CM

## 2024-07-29 DIAGNOSIS — R42 DIZZINESS: ICD-10-CM

## 2024-07-29 DIAGNOSIS — I48.11 LONGSTANDING PERSISTENT ATRIAL FIBRILLATION (HCC): Primary | ICD-10-CM

## 2024-07-29 PROCEDURE — G8427 DOCREV CUR MEDS BY ELIG CLIN: HCPCS | Performed by: SPECIALIST

## 2024-07-29 PROCEDURE — 99214 OFFICE O/P EST MOD 30 MIN: CPT | Performed by: SPECIALIST

## 2024-07-29 PROCEDURE — 99204 OFFICE O/P NEW MOD 45 MIN: CPT | Performed by: SPECIALIST

## 2024-07-29 PROCEDURE — 3078F DIAST BP <80 MM HG: CPT | Performed by: SPECIALIST

## 2024-07-29 PROCEDURE — G8417 CALC BMI ABV UP PARAM F/U: HCPCS | Performed by: SPECIALIST

## 2024-07-29 PROCEDURE — 3074F SYST BP LT 130 MM HG: CPT | Performed by: SPECIALIST

## 2024-07-29 RX ORDER — EPLERENONE 50 MG/1
25 TABLET, FILM COATED ORAL DAILY
Qty: 45 TABLET | Refills: 3
Start: 2024-07-29 | End: 2024-08-02 | Stop reason: SDUPTHER

## 2024-07-29 ASSESSMENT — PATIENT HEALTH QUESTIONNAIRE - PHQ9
SUM OF ALL RESPONSES TO PHQ QUESTIONS 1-9: 0
1. LITTLE INTEREST OR PLEASURE IN DOING THINGS: NOT AT ALL
SUM OF ALL RESPONSES TO PHQ QUESTIONS 1-9: 0
2. FEELING DOWN, DEPRESSED OR HOPELESS: NOT AT ALL
SUM OF ALL RESPONSES TO PHQ9 QUESTIONS 1 & 2: 0

## 2024-07-29 NOTE — PATIENT INSTRUCTIONS
Reduce Eplerenone to half tablet daily.    Check weight and BP three times a week. Check weight every morning after going to the bathroom. Check home BP at different times of day. Keep a diary of numbers and have them ready to review in a month with a telephone visit.    You have been scheduled for an echo in our office.    We will see you back in about 3 months.

## 2024-07-29 NOTE — PROGRESS NOTES
incontinence     Vertigo       Social Hx= reports that he quit smoking about 29 years ago. His smoking use included cigarettes. He started smoking about 64 years ago. He has a 35.0 pack-year smoking history. He has never used smokeless tobacco. He reports current alcohol use of about 14.0 standard drinks of alcohol per week. He reports that he does not use drugs.   Family Hx- family history includes Cancer in his mother; Diabetes in his mother and sister; Other in his sister.   Allergies   Allergen Reactions    Lanolin Itching        Exam and Labs:  /74 (Site: Right Upper Arm, Position: Sitting, Cuff Size: Large Adult)   Pulse (!) 109   Ht 1.88 m (6' 2\")   Wt 122.9 kg (271 lb)   SpO2 96%   BMI 34.79 kg/m²    @Constitutional:  NAD, comfortable  Head: NC,AT. Eyes: No scleral icterus. Neck:  Neck supple. No JVD present.Throat: moist mucous membranes.  Chest: Effort normal & normal respiratory excursion .Neurological: alert, conversant and oriented . Skin: Skin is not cold. No obvious systemic rash noted. Not diaphoretic. No erythema.   Psychiatric:  Grossly normal mood and affect.  Behavior appears normal.   Extremities:  no clubbing or cyanosis. Abdomen: non distended    Lungs:breath sounds normal. No stridor. distress, wheezes or  Rales.  Heart:    Irregularly irregular rhythm, normal S1, S2, no murmurs, rubs, clicks or gallops , PMI non displaced.    Edema: Edema is none.      Lab Results   Component Value Date    CHOL 160 07/15/2024    CHOL 180 08/15/2022    CHOL 162 08/13/2021     Lab Results   Component Value Date    TRIG 137 07/15/2024    TRIG 188 (H) 08/15/2022    TRIG 116 08/13/2021     Lab Results   Component Value Date    HDL 48 07/15/2024    HDL 52 08/15/2022    HDL 67 08/13/2021     No components found for: \"LDLCHOLESTEROL\", \"LDLCALC\"  Lab Results   Component Value Date    VLDL 27.4 07/15/2024    VLDL 37.6 08/15/2022    VLDL 23.2 08/13/2021     Lab Results   Component Value Date    CHOLHDLRATIO

## 2024-07-30 ENCOUNTER — TELEPHONE (OUTPATIENT)
Age: 79
End: 2024-07-30

## 2024-07-30 DIAGNOSIS — I48.0 PAROXYSMAL ATRIAL FIBRILLATION (HCC): Primary | ICD-10-CM

## 2024-07-30 NOTE — TELEPHONE ENCOUNTER
Telephone call made to patient. Two patient identifiers verified.   Went over holter instructions. Patient would like it sent to his house. Sent message to device tech to mail. Patient appreciated the call.

## 2024-07-30 NOTE — TELEPHONE ENCOUNTER
----- Message from Ning Snowden MD sent at 7/29/2024  6:44 PM EDT -----  Needs a 7 day holter for afib

## 2024-07-31 ENCOUNTER — TELEPHONE (OUTPATIENT)
Age: 79
End: 2024-07-31

## 2024-07-31 NOTE — TELEPHONE ENCOUNTER
Enrolled with [employer] - Ordered and being shipped to patient's home address on file.  ETA within 5-7 business days.        7 day holter  Received: Yesterday  Jenna De, RN  Adriana Urena  Please order and mail a 7 day holter for afib per Dr. Snowden.    Thanks

## 2024-08-02 DIAGNOSIS — I48.11 LONGSTANDING PERSISTENT ATRIAL FIBRILLATION (HCC): Primary | ICD-10-CM

## 2024-08-02 RX ORDER — AMLODIPINE BESYLATE 5 MG/1
7.5 TABLET ORAL DAILY
Qty: 135 TABLET | Refills: 3 | Status: SHIPPED | OUTPATIENT
Start: 2024-08-02

## 2024-08-02 RX ORDER — EPLERENONE 25 MG/1
25 TABLET, FILM COATED ORAL DAILY
Qty: 90 TABLET | Refills: 3 | Status: SHIPPED | OUTPATIENT
Start: 2024-08-02

## 2024-08-02 RX ORDER — METOPROLOL SUCCINATE 50 MG/1
50 TABLET, EXTENDED RELEASE ORAL EVERY EVENING
Qty: 90 TABLET | Refills: 3 | Status: SHIPPED | OUTPATIENT
Start: 2024-08-02

## 2024-08-02 NOTE — PROGRESS NOTES
Per VO by MD.    Future Appointments   Date Time Provider Department Center   8/27/2024 11:40 AM Ning Snowden MD [facility]   9/30/2024  3:00 PM [facility]   9/30/2024  4:00 PM Gen Guzmán MD [facility]   10/28/2024 10:20 AM Ning Snowden MD [facility]

## 2024-08-13 DIAGNOSIS — R93.89 ABNORMAL CHEST CT: Primary | ICD-10-CM

## 2024-08-13 RX ORDER — ROSUVASTATIN CALCIUM 10 MG/1
TABLET, COATED ORAL
Qty: 90 TABLET | Refills: 0 | Status: SHIPPED | OUTPATIENT
Start: 2024-08-13

## 2024-08-16 ENCOUNTER — HOSPITAL ENCOUNTER (OUTPATIENT)
Age: 79
End: 2024-08-16
Payer: MEDICARE

## 2024-08-16 DIAGNOSIS — R93.89 ABNORMAL CHEST CT: ICD-10-CM

## 2024-08-16 PROCEDURE — 71260 CT THORAX DX C+: CPT

## 2024-08-16 PROCEDURE — 6360000004 HC RX CONTRAST MEDICATION: Performed by: RADIOLOGY

## 2024-08-16 RX ADMIN — IOPAMIDOL 100 ML: 755 INJECTION, SOLUTION INTRAVENOUS at 12:44

## 2024-08-19 ENCOUNTER — TELEPHONE (OUTPATIENT)
Age: 79
End: 2024-08-19

## 2024-08-19 NOTE — TELEPHONE ENCOUNTER
Sent holter results for Dr. Adam review. Pt did have a couple pauses longest pause 3024 ms on Day 6 / 11:21:30 am.

## 2024-08-20 NOTE — TELEPHONE ENCOUNTER
Holter reviewed and shows persistent chronic atrial fibrillation now over the 14 days monitored.  Heart rate control is good.  There are pauses of up to 3 seconds.  In atrial fibrillation longer pauses than 2 seconds are tolerable as long as the patient is not significantly symptomatic.  If he has syncope or weakness due to the rhythm then we can consider pacer.  Will discuss this further at his follow-up visit,  no changes at this time  Take care, Dr Snowden  Future Appointments   Date Time Provider Department Center   8/27/2024 11:40 AM Ning Snowden MD [facility] BS AMB   9/30/2024  3:00 PM [facility] [place] [facility] BS AMB   9/30/2024  4:00 PM Gen Guzmán MD [facility] BS AMB   10/28/2024 10:20 AM Ning Snowden MD [facility] BS AMB

## 2024-08-27 ENCOUNTER — TELEMEDICINE (OUTPATIENT)
Age: 79
End: 2024-08-27

## 2024-08-27 DIAGNOSIS — I48.0 PAROXYSMAL ATRIAL FIBRILLATION (HCC): Primary | ICD-10-CM

## 2024-08-27 DIAGNOSIS — I10 ESSENTIAL HYPERTENSION: ICD-10-CM

## 2024-08-27 NOTE — PROGRESS NOTES
TELEPHONE VISIT DOCUMENTATION       He and/or health care decision maker is aware that that he may receive a bill for this telephone service, depending on his insurance coverage, and has provided verbal consent to proceed.  This is a Patient Initiated Episode with an Established Patient who has not had a related appointment within my department in the past 7 days or scheduled within the next 24 hours.     ASSESSMENT       Diagnosis Orders   1. Paroxysmal atrial fibrillation (HCC)        2. Essential hypertension             PLAN     Future Appointments   Date Time Provider Department Center   9/30/2024  3:00 PM [facility] [place] [facility] [place] [facility]  AMB   9/30/2024  4:00 PM Gen Guzmán MD [facility] BS AMB   10/28/2024 10:20 AM Ning Snowden MD [facility]  AMB      We discussed the expected course, resolution and complications of the diagnosis(es) in detail.  Medication risks, benefits, costs, interactions, and alternatives were discussed as indicated.  I advised him to contact the office if his condition worsens, changes or fails to improve as anticipated. He expressed understanding with the diagnosis(es) and plan    HISTORY OF PRESENTING ILLNESS      Simba Quintero is a 78 y.o. male evaluated via telephone on 8/27/2024 due to COVID 19 restrictions. Patient unable to participate in Virtual Visit with synchronous audio/visual technology.      Patient seen 7/29/2024 noted chronic dizziness and shortness of breath.  History of atrial fibrillation bladder cancer cardioversion in 2020.  We reduce his a phone to half tablet last when checked blood pressure diary.  He says he is feeling about the same though the shortness of breath is better.       ACTIVE PROBLEM LIST     Patient Active Problem List    Diagnosis Date Noted    Bladder cancer (HCC) 01/16/2024    Thrombocytopenia, unspecified (HCC) 09/19/2023    Toe ulcer, left, with unspecified severity (HCC) 02/23/2023    Cerebral atrophy (HCC) 02/23/2023    Paroxysmal  3:00 PM [facility] AMB   9/30/2024  4:00 PM Gen Guzmán MD [facility] BS AMB   10/28/2024 10:20 AM Ning Snowden MD [facility] BS AMB          Ning Snowden MD    [facility] [place]  [facility]        [address] [place], [place]  [address] [phone] / [phone] Fax       [facility]  [address] [place] [address], [address]  [place], [place]  [address] [phone] / [phone] Fax

## 2024-09-23 ENCOUNTER — TELEPHONE (OUTPATIENT)
Age: 79
End: 2024-09-23

## 2024-09-25 ENCOUNTER — TELEPHONE (OUTPATIENT)
Age: 79
End: 2024-09-25

## 2024-09-25 ENCOUNTER — HOSPITAL ENCOUNTER (OUTPATIENT)
Facility: HOSPITAL | Age: 79
Discharge: HOME | End: 2024-09-28
Payer: MEDICARE

## 2024-09-25 VITALS
SYSTOLIC BLOOD PRESSURE: 130 MMHG | BODY MASS INDEX: 34.65 KG/M2 | WEIGHT: 270 LBS | HEART RATE: 90 BPM | HEIGHT: 74 IN | TEMPERATURE: 97.8 F | DIASTOLIC BLOOD PRESSURE: 71 MMHG

## 2024-09-25 LAB
ALBUMIN SERPL-MCNC: 3.6 G/DL (ref 3.5–5)
ALBUMIN/GLOB SERPL: 1.3 (ref 1.1–2.2)
ALP SERPL-CCNC: 79 U/L (ref 45–117)
ALT SERPL-CCNC: 18 U/L (ref 12–78)
ANION GAP SERPL CALC-SCNC: 6 MMOL/L (ref 2–12)
AST SERPL-CCNC: 17 U/L (ref 15–37)
BASOPHILS # BLD: 0 K/UL (ref 0–0.1)
BASOPHILS NFR BLD: 1 % (ref 0–1)
BILIRUB SERPL-MCNC: 0.5 MG/DL (ref 0.2–1)
BUN SERPL-MCNC: 17 MG/DL (ref 6–20)
BUN/CREAT SERPL: 20 (ref 12–20)
CALCIUM SERPL-MCNC: 9.4 MG/DL (ref 8.5–10.1)
CHLORIDE SERPL-SCNC: 107 MMOL/L (ref 97–108)
CO2 SERPL-SCNC: 26 MMOL/L (ref 21–32)
CREAT SERPL-MCNC: 0.84 MG/DL (ref 0.7–1.3)
DIFFERENTIAL METHOD BLD: ABNORMAL
EKG DIAGNOSIS: NORMAL
EKG Q-T INTERVAL: 404 MS
EKG QRS DURATION: 136 MS
EKG QTC CALCULATION (BAZETT): 448 MS
EKG R AXIS: 15 DEGREES
EKG T AXIS: 21 DEGREES
EKG VENTRICULAR RATE: 74 BPM
EOSINOPHIL # BLD: 0.1 K/UL (ref 0–0.4)
EOSINOPHIL NFR BLD: 2 % (ref 0–7)
ERYTHROCYTE [DISTWIDTH] IN BLOOD BY AUTOMATED COUNT: 13.4 % (ref 11.5–14.5)
GLOBULIN SER CALC-MCNC: 2.8 G/DL (ref 2–4)
GLUCOSE SERPL-MCNC: 127 MG/DL (ref 65–100)
HCT VFR BLD AUTO: 41 % (ref 36.6–50.3)
HGB BLD-MCNC: 13.9 G/DL (ref 12.1–17)
IMM GRANULOCYTES # BLD AUTO: 0 K/UL (ref 0–0.04)
IMM GRANULOCYTES NFR BLD AUTO: 1 % (ref 0–0.5)
LYMPHOCYTES # BLD: 0.8 K/UL (ref 0.8–3.5)
LYMPHOCYTES NFR BLD: 14 % (ref 12–49)
MCH RBC QN AUTO: 34.2 PG (ref 26–34)
MCHC RBC AUTO-ENTMCNC: 33.9 G/DL (ref 30–36.5)
MCV RBC AUTO: 101 FL (ref 80–99)
MONOCYTES # BLD: 0.6 K/UL (ref 0–1)
MONOCYTES NFR BLD: 11 % (ref 5–13)
NEUTS SEG # BLD: 4.1 K/UL (ref 1.8–8)
NEUTS SEG NFR BLD: 71 % (ref 32–75)
NRBC # BLD: 0 K/UL (ref 0–0.01)
NRBC BLD-RTO: 0 PER 100 WBC
PLATELET # BLD AUTO: 139 K/UL (ref 150–400)
PMV BLD AUTO: 10.6 FL (ref 8.9–12.9)
POTASSIUM SERPL-SCNC: 3.8 MMOL/L (ref 3.5–5.1)
PROT SERPL-MCNC: 6.4 G/DL (ref 6.4–8.2)
RBC # BLD AUTO: 4.06 M/UL (ref 4.1–5.7)
SODIUM SERPL-SCNC: 139 MMOL/L (ref 136–145)
WBC # BLD AUTO: 5.7 K/UL (ref 4.1–11.1)

## 2024-09-25 PROCEDURE — 93010 ELECTROCARDIOGRAM REPORT: CPT | Performed by: INTERNAL MEDICINE

## 2024-09-25 PROCEDURE — 93005 ELECTROCARDIOGRAM TRACING: CPT | Performed by: UROLOGY

## 2024-09-25 PROCEDURE — 80053 COMPREHEN METABOLIC PANEL: CPT

## 2024-09-25 PROCEDURE — 36415 COLL VENOUS BLD VENIPUNCTURE: CPT

## 2024-09-25 PROCEDURE — 85025 COMPLETE CBC W/AUTO DIFF WBC: CPT

## 2024-09-30 ENCOUNTER — APPOINTMENT (OUTPATIENT)
Age: 79
End: 2024-09-30
Payer: MEDICARE

## 2024-09-30 ENCOUNTER — ANCILLARY PROCEDURE (OUTPATIENT)
Age: 79
End: 2024-09-30
Payer: MEDICARE

## 2024-09-30 VITALS
DIASTOLIC BLOOD PRESSURE: 71 MMHG | HEART RATE: 75 BPM | BODY MASS INDEX: 34.65 KG/M2 | WEIGHT: 270 LBS | SYSTOLIC BLOOD PRESSURE: 130 MMHG | HEIGHT: 74 IN

## 2024-09-30 DIAGNOSIS — I48.11 LONGSTANDING PERSISTENT ATRIAL FIBRILLATION (HCC): ICD-10-CM

## 2024-09-30 DIAGNOSIS — I35.8 AORTIC VALVE SCLEROSIS: ICD-10-CM

## 2024-09-30 PROCEDURE — C8929 TTE W OR WO FOL WCON,DOPPLER: HCPCS | Performed by: SPECIALIST

## 2024-09-30 RX ADMIN — PERFLUTREN 10 ML: 6.52 INJECTION, SUSPENSION INTRAVENOUS at 15:10

## 2024-10-01 ENCOUNTER — ANESTHESIA (OUTPATIENT)
Facility: HOSPITAL | Age: 79
End: 2024-10-01
Payer: MEDICARE

## 2024-10-01 ENCOUNTER — HOSPITAL ENCOUNTER (OUTPATIENT)
Facility: HOSPITAL | Age: 79
Setting detail: OUTPATIENT SURGERY
Discharge: HOME | End: 2024-10-01
Attending: UROLOGY | Admitting: UROLOGY
Payer: MEDICARE

## 2024-10-01 ENCOUNTER — ANESTHESIA EVENT (OUTPATIENT)
Facility: HOSPITAL | Age: 79
End: 2024-10-01
Payer: MEDICARE

## 2024-10-01 VITALS
HEART RATE: 61 BPM | WEIGHT: 267.86 LBS | OXYGEN SATURATION: 95 % | DIASTOLIC BLOOD PRESSURE: 81 MMHG | BODY MASS INDEX: 34.39 KG/M2 | RESPIRATION RATE: 14 BRPM | TEMPERATURE: 98.1 F | SYSTOLIC BLOOD PRESSURE: 106 MMHG

## 2024-10-01 DIAGNOSIS — C67.1 MALIGNANT NEOPLASM OF DOME OF URINARY BLADDER (HCC): Primary | ICD-10-CM

## 2024-10-01 PROCEDURE — 3700000000 HC ANESTHESIA ATTENDED CARE: Performed by: UROLOGY

## 2024-10-01 PROCEDURE — 7100000000 HC PACU RECOVERY - FIRST 15 MIN: Performed by: UROLOGY

## 2024-10-01 PROCEDURE — 6370000000 HC RX 637 (ALT 250 FOR IP): Performed by: ANESTHESIOLOGY

## 2024-10-01 PROCEDURE — 2500000003 HC RX 250 WO HCPCS: Performed by: NURSE ANESTHETIST, CERTIFIED REGISTERED

## 2024-10-01 PROCEDURE — 88305 TISSUE EXAM BY PATHOLOGIST: CPT

## 2024-10-01 PROCEDURE — 6360000002 HC RX W HCPCS: Performed by: UROLOGY

## 2024-10-01 PROCEDURE — 2580000003 HC RX 258: Performed by: ANESTHESIOLOGY

## 2024-10-01 PROCEDURE — 3700000001 HC ADD 15 MINUTES (ANESTHESIA): Performed by: UROLOGY

## 2024-10-01 PROCEDURE — 7100000010 HC PHASE II RECOVERY - FIRST 15 MIN: Performed by: UROLOGY

## 2024-10-01 PROCEDURE — 2580000003 HC RX 258: Performed by: UROLOGY

## 2024-10-01 PROCEDURE — 6360000002 HC RX W HCPCS: Performed by: NURSE ANESTHETIST, CERTIFIED REGISTERED

## 2024-10-01 PROCEDURE — 7100000001 HC PACU RECOVERY - ADDTL 15 MIN: Performed by: UROLOGY

## 2024-10-01 PROCEDURE — 7100000011 HC PHASE II RECOVERY - ADDTL 15 MIN: Performed by: UROLOGY

## 2024-10-01 PROCEDURE — 3600000002 HC SURGERY LEVEL 2 BASE: Performed by: UROLOGY

## 2024-10-01 PROCEDURE — 2709999900 HC NON-CHARGEABLE SUPPLY: Performed by: UROLOGY

## 2024-10-01 PROCEDURE — 3600000012 HC SURGERY LEVEL 2 ADDTL 15MIN: Performed by: UROLOGY

## 2024-10-01 RX ORDER — ONDANSETRON 2 MG/ML
4 INJECTION INTRAMUSCULAR; INTRAVENOUS
Status: DISCONTINUED | OUTPATIENT
Start: 2024-10-01 | End: 2024-10-01 | Stop reason: HOSPADM

## 2024-10-01 RX ORDER — SODIUM CHLORIDE, SODIUM LACTATE, POTASSIUM CHLORIDE, CALCIUM CHLORIDE 600; 310; 30; 20 MG/100ML; MG/100ML; MG/100ML; MG/100ML
INJECTION, SOLUTION INTRAVENOUS CONTINUOUS
Status: DISCONTINUED | OUTPATIENT
Start: 2024-10-01 | End: 2024-10-01 | Stop reason: HOSPADM

## 2024-10-01 RX ORDER — LIDOCAINE HYDROCHLORIDE 20 MG/ML
INJECTION, SOLUTION EPIDURAL; INFILTRATION; INTRACAUDAL; PERINEURAL
Status: DISCONTINUED | OUTPATIENT
Start: 2024-10-01 | End: 2024-10-01 | Stop reason: SDUPTHER

## 2024-10-01 RX ORDER — FENTANYL CITRATE 50 UG/ML
25 INJECTION, SOLUTION INTRAMUSCULAR; INTRAVENOUS EVERY 5 MIN PRN
Status: DISCONTINUED | OUTPATIENT
Start: 2024-10-01 | End: 2024-10-01 | Stop reason: HOSPADM

## 2024-10-01 RX ORDER — SODIUM CHLORIDE 0.9 % (FLUSH) 0.9 %
5-40 SYRINGE (ML) INJECTION PRN
Status: DISCONTINUED | OUTPATIENT
Start: 2024-10-01 | End: 2024-10-01 | Stop reason: HOSPADM

## 2024-10-01 RX ORDER — ROCURONIUM BROMIDE 10 MG/ML
INJECTION, SOLUTION INTRAVENOUS
Status: DISCONTINUED | OUTPATIENT
Start: 2024-10-01 | End: 2024-10-01 | Stop reason: SDUPTHER

## 2024-10-01 RX ORDER — SODIUM CHLORIDE 0.9 % (FLUSH) 0.9 %
5-40 SYRINGE (ML) INJECTION EVERY 12 HOURS SCHEDULED
Status: DISCONTINUED | OUTPATIENT
Start: 2024-10-01 | End: 2024-10-01 | Stop reason: HOSPADM

## 2024-10-01 RX ORDER — PHENAZOPYRIDINE HYDROCHLORIDE 100 MG/1
100 TABLET, FILM COATED ORAL 3 TIMES DAILY PRN
Qty: 15 TABLET | Refills: 0 | Status: SHIPPED | OUTPATIENT
Start: 2024-10-01 | End: 2025-10-01

## 2024-10-01 RX ORDER — ONDANSETRON 2 MG/ML
INJECTION INTRAMUSCULAR; INTRAVENOUS
Status: DISCONTINUED | OUTPATIENT
Start: 2024-10-01 | End: 2024-10-01 | Stop reason: SDUPTHER

## 2024-10-01 RX ORDER — DEXAMETHASONE SODIUM PHOSPHATE 4 MG/ML
INJECTION, SOLUTION INTRA-ARTICULAR; INTRALESIONAL; INTRAMUSCULAR; INTRAVENOUS; SOFT TISSUE
Status: DISCONTINUED | OUTPATIENT
Start: 2024-10-01 | End: 2024-10-01 | Stop reason: SDUPTHER

## 2024-10-01 RX ORDER — OXYCODONE HYDROCHLORIDE 5 MG/1
5 TABLET ORAL
Status: DISCONTINUED | OUTPATIENT
Start: 2024-10-01 | End: 2024-10-01 | Stop reason: HOSPADM

## 2024-10-01 RX ORDER — ACETAMINOPHEN 500 MG
1000 TABLET ORAL ONCE
Status: COMPLETED | OUTPATIENT
Start: 2024-10-01 | End: 2024-10-01

## 2024-10-01 RX ORDER — SODIUM CHLORIDE 9 MG/ML
INJECTION, SOLUTION INTRAVENOUS PRN
Status: DISCONTINUED | OUTPATIENT
Start: 2024-10-01 | End: 2024-10-01 | Stop reason: HOSPADM

## 2024-10-01 RX ORDER — NALOXONE HYDROCHLORIDE 0.4 MG/ML
INJECTION, SOLUTION INTRAMUSCULAR; INTRAVENOUS; SUBCUTANEOUS PRN
Status: DISCONTINUED | OUTPATIENT
Start: 2024-10-01 | End: 2024-10-01 | Stop reason: HOSPADM

## 2024-10-01 RX ORDER — CEPHALEXIN 500 MG/1
500 CAPSULE ORAL 3 TIMES DAILY
Qty: 9 CAPSULE | Refills: 0 | Status: SHIPPED | OUTPATIENT
Start: 2024-10-01 | End: 2024-10-04

## 2024-10-01 RX ORDER — OXYCODONE AND ACETAMINOPHEN 5; 325 MG/1; MG/1
1 TABLET ORAL EVERY 6 HOURS PRN
Qty: 8 TABLET | Refills: 0 | Status: SHIPPED | OUTPATIENT
Start: 2024-10-01 | End: 2024-10-06

## 2024-10-01 RX ORDER — SUCCINYLCHOLINE/SOD CL,ISO/PF 200MG/10ML
SYRINGE (ML) INTRAVENOUS
Status: DISCONTINUED | OUTPATIENT
Start: 2024-10-01 | End: 2024-10-01 | Stop reason: SDUPTHER

## 2024-10-01 RX ORDER — FENTANYL CITRATE 50 UG/ML
INJECTION, SOLUTION INTRAMUSCULAR; INTRAVENOUS
Status: DISCONTINUED | OUTPATIENT
Start: 2024-10-01 | End: 2024-10-01 | Stop reason: SDUPTHER

## 2024-10-01 RX ORDER — PROPOFOL 10 MG/ML
INJECTION, EMULSION INTRAVENOUS
Status: DISCONTINUED | OUTPATIENT
Start: 2024-10-01 | End: 2024-10-01 | Stop reason: SDUPTHER

## 2024-10-01 RX ORDER — MIDAZOLAM HYDROCHLORIDE 2 MG/2ML
2 INJECTION, SOLUTION INTRAMUSCULAR; INTRAVENOUS PRN
Status: DISCONTINUED | OUTPATIENT
Start: 2024-10-01 | End: 2024-10-01 | Stop reason: HOSPADM

## 2024-10-01 RX ORDER — LIDOCAINE HYDROCHLORIDE 10 MG/ML
1 INJECTION, SOLUTION EPIDURAL; INFILTRATION; INTRACAUDAL; PERINEURAL
Status: DISCONTINUED | OUTPATIENT
Start: 2024-10-01 | End: 2024-10-01 | Stop reason: HOSPADM

## 2024-10-01 RX ORDER — EPHEDRINE SULFATE 50 MG/ML
INJECTION INTRAVENOUS
Status: DISCONTINUED | OUTPATIENT
Start: 2024-10-01 | End: 2024-10-01 | Stop reason: SDUPTHER

## 2024-10-01 RX ORDER — FENTANYL CITRATE 50 UG/ML
100 INJECTION, SOLUTION INTRAMUSCULAR; INTRAVENOUS
Status: DISCONTINUED | OUTPATIENT
Start: 2024-10-01 | End: 2024-10-01 | Stop reason: HOSPADM

## 2024-10-01 RX ORDER — PROCHLORPERAZINE EDISYLATE 5 MG/ML
5 INJECTION INTRAMUSCULAR; INTRAVENOUS
Status: DISCONTINUED | OUTPATIENT
Start: 2024-10-01 | End: 2024-10-01 | Stop reason: HOSPADM

## 2024-10-01 RX ORDER — ONDANSETRON 2 MG/ML
4 INJECTION INTRAMUSCULAR; INTRAVENOUS AS NEEDED
Status: DISCONTINUED | OUTPATIENT
Start: 2024-10-01 | End: 2024-10-01 | Stop reason: HOSPADM

## 2024-10-01 RX ORDER — HYDRALAZINE HYDROCHLORIDE 20 MG/ML
10 INJECTION INTRAMUSCULAR; INTRAVENOUS ONCE
Status: DISCONTINUED | OUTPATIENT
Start: 2024-10-01 | End: 2024-10-01 | Stop reason: HOSPADM

## 2024-10-01 RX ADMIN — ACETAMINOPHEN 1000 MG: 500 TABLET ORAL at 13:08

## 2024-10-01 RX ADMIN — ROCURONIUM BROMIDE 10 MG: 10 INJECTION, SOLUTION INTRAVENOUS at 13:24

## 2024-10-01 RX ADMIN — SODIUM CHLORIDE 3000 MG: 900 INJECTION INTRAVENOUS at 13:30

## 2024-10-01 RX ADMIN — EPHEDRINE SULFATE 10 MG: 50 INJECTION INTRAVENOUS at 13:45

## 2024-10-01 RX ADMIN — PROPOFOL 100 MG: 10 INJECTION, EMULSION INTRAVENOUS at 13:24

## 2024-10-01 RX ADMIN — FENTANYL CITRATE 50 MCG: 50 INJECTION, SOLUTION INTRAMUSCULAR; INTRAVENOUS at 13:15

## 2024-10-01 RX ADMIN — Medication 140 MG: at 13:25

## 2024-10-01 RX ADMIN — DEXAMETHASONE SODIUM PHOSPHATE 4 MG: 4 INJECTION INTRA-ARTICULAR; INTRALESIONAL; INTRAMUSCULAR; INTRAVENOUS; SOFT TISSUE at 13:28

## 2024-10-01 RX ADMIN — ONDANSETRON 4 MG: 2 INJECTION INTRAMUSCULAR; INTRAVENOUS at 13:28

## 2024-10-01 RX ADMIN — PROPOFOL 50 MG: 10 INJECTION, EMULSION INTRAVENOUS at 13:25

## 2024-10-01 RX ADMIN — LIDOCAINE HYDROCHLORIDE 100 MG: 20 INJECTION, SOLUTION EPIDURAL; INFILTRATION; INTRACAUDAL; PERINEURAL at 13:24

## 2024-10-01 RX ADMIN — ROCURONIUM BROMIDE 40 MG: 10 INJECTION, SOLUTION INTRAVENOUS at 13:31

## 2024-10-01 RX ADMIN — FENTANYL CITRATE 50 MCG: 50 INJECTION, SOLUTION INTRAMUSCULAR; INTRAVENOUS at 13:36

## 2024-10-01 RX ADMIN — SODIUM CHLORIDE, POTASSIUM CHLORIDE, SODIUM LACTATE AND CALCIUM CHLORIDE: 600; 310; 30; 20 INJECTION, SOLUTION INTRAVENOUS at 13:08

## 2024-10-01 ASSESSMENT — PAIN - FUNCTIONAL ASSESSMENT
PAIN_FUNCTIONAL_ASSESSMENT: NONE - DENIES PAIN
PAIN_FUNCTIONAL_ASSESSMENT: NONE - DENIES PAIN

## 2024-10-01 NOTE — FLOWSHEET NOTE
10/01/24 1340   Family Communication    Relationship to Patient Spouse    Phone Number Homero Davies   Family/Significant Other Update Called;Updated   Delivery Origin Nurse   Message Disposition Family present - message delivered   Update Given Yes   Family Communication   Family Update Message Surgeon working;Procedure started;Patient stable

## 2024-10-01 NOTE — ANESTHESIA PRE PROCEDURE
Department of Anesthesiology  Preprocedure Note       Name:  Simba Quintero   Age:  78 y.o.  :  1945                                          MRN:  [id number]         Date:  10/1/2024      Surgeon: Surgeon(s):  Jose Eduardo Mccain MD    Procedure: Procedure(s):  CYSTOSCOPY WITH BIOPSY OF BLADDER AND FULGERATION    Medications prior to admission:   Prior to Admission medications    Medication Sig Start Date End Date Taking? Authorizing Provider   rosuvastatin (CRESTOR) 10 MG tablet TAKE ONE TABLET BY MOUTH ONCE NIGHTLY  Patient taking differently: Take 1 tablet by mouth Daily with supper 24   Huang Gibson MD   amLODIPine (NORVASC) 5 MG tablet Take 1.5 tablets by mouth daily 24   Ning Snowden MD   apixaban (ELIQUIS) 5 MG TABS tablet Take 1 tablet by mouth 2 times daily 24   Ning Snowden MD   metoprolol succinate (TOPROL XL) 50 MG extended release tablet Take 1 tablet by mouth every evening  Patient taking differently: Take 1 tablet by mouth Daily with supper 24   Ning Snowden MD   finasteride (PROSCAR) 5 MG tablet Take 1 tablet by mouth daily    ProviderCam MD   tamsulosin (FLOMAX) 0.4 MG capsule Take 1 capsule by mouth Daily with supper    ProviderCam MD   pregabalin (LYRICA) 75 MG capsule Take 1 capsule by mouth 2 times daily for 180 days. Max Daily Amount: 150 mg 24  Huang Gibson MD   mirtazapine (REMERON) 30 MG tablet TAKE ONE TABLET BY MOUTH ONCE NIGHTLY  Patient taking differently: Take 1 tablet by mouth nightly 24   Huang Gibson MD   aspirin 81 MG EC tablet Take 1 tablet by mouth daily    ProviderCam MD       Current medications:    No current facility-administered medications for this encounter.       Allergies:    Allergies   Allergen Reactions   • Lanolin Itching       Problem List:    Patient Active Problem List   Diagnosis Code   • Essential hypertension I10   • Advance directive on file Z78.9   • Anemia D64.9

## 2024-10-01 NOTE — ANESTHESIA POSTPROCEDURE EVALUATION
10/1/2024    Multimodal analgesia pain management approach  Pain management: satisfactory to patient    No notable events documented.

## 2024-10-01 NOTE — OP NOTE
an area of redness, there were 2 areas in fact right next to each other.  I biopsied both those areas, which were in the left dome and send the specimen together and fulgurated with electrocautery to achieve excellent hemostasis.  The bladder was emptied and the scope was removed.  He tolerated the procedure well.  He will be discharged home today and follow up in the office for pathology review.  I discussed with his wife.    TUBES AND DRAINS:  None.        MD PANKAJ PAYNE/AQS  D:  10/01/2024 13:56:19  T:  10/01/2024 16:08:46  JOB #:  [id number]

## 2024-10-01 NOTE — BRIEF OP NOTE
Brief Postoperative Note      Patient: Simba Quintero  YOB: 1945  MRN: [id number]    Date of Procedure: 10/1/2024    Pre-Op Diagnosis Codes:      History of bladder cancer    Post-Op Diagnosis: Same       Procedure(s):  CYSTOSCOPY WITH BIOPSY OF BLADDER AND FULGERATION    Surgeon(s):  Jose Eduardo Mccain MD    Assistant:  * No surgical staff found *    Anesthesia: General    Estimated Blood Loss (mL): Minimal    Complications: None    Specimens:   ID Type Source Tests Collected by Time Destination   1 : LEFT DOME OF BLADDER Tissue Bladder SURGICAL PATHOLOGY Jose Eduardo Mccain MD 10/1/2024 1342        Implants:  * No implants in log *      Drains: * No LDAs found *    Findings:  Infection Present At Time Of Surgery (PATOS) (choose all levels that have infection present):  No infection present  Other Findings: dome erythema - biopsied and hemstasis excellent.    Electronically signed by Jose Eduardo Mccain MD on 10/1/2024 at 2:56 PM

## 2024-10-03 LAB
ECHO AO ASC DIAM: 3.5 CM
ECHO AO ASCENDING AORTA INDEX: 1.42 CM/M2
ECHO AO ROOT DIAM: 3.8 CM
ECHO AO ROOT INDEX: 1.54 CM/M2
ECHO AV AREA PEAK VELOCITY: 2.6 CM2
ECHO AV AREA VTI: 2.6 CM2
ECHO AV AREA/BSA PEAK VELOCITY: 1.1 CM2/M2
ECHO AV AREA/BSA VTI: 1.1 CM2/M2
ECHO AV MEAN GRADIENT: 3 MMHG
ECHO AV MEAN VELOCITY: 0.7 M/S
ECHO AV PEAK GRADIENT: 5 MMHG
ECHO AV PEAK VELOCITY: 1.1 M/S
ECHO AV VELOCITY RATIO: 0.73
ECHO AV VTI: 19.7 CM
ECHO BSA: 2.53 M2
ECHO LA DIAMETER INDEX: 1.98 CM/M2
ECHO LA DIAMETER: 4.9 CM
ECHO LA TO AORTIC ROOT RATIO: 1.29
ECHO LA VOL A-L A2C: 93 ML (ref 18–58)
ECHO LA VOL A-L A4C: 131 ML (ref 18–58)
ECHO LA VOL BP: 107 ML (ref 18–58)
ECHO LA VOL MOD A2C: 90 ML (ref 18–58)
ECHO LA VOL MOD A4C: 119 ML (ref 18–58)
ECHO LA VOL/BSA BIPLANE: 43 ML/M2 (ref 16–34)
ECHO LA VOLUME AREA LENGTH: 116 ML
ECHO LA VOLUME INDEX A-L A2C: 38 ML/M2 (ref 16–34)
ECHO LA VOLUME INDEX A-L A4C: 53 ML/M2 (ref 16–34)
ECHO LA VOLUME INDEX AREA LENGTH: 47 ML/M2 (ref 16–34)
ECHO LA VOLUME INDEX MOD A2C: 36 ML/M2 (ref 16–34)
ECHO LA VOLUME INDEX MOD A4C: 48 ML/M2 (ref 16–34)
ECHO LV E' SEPTAL VELOCITY: 7.2 CM/S
ECHO LV EF PHYSICIAN: 60 %
ECHO LV FRACTIONAL SHORTENING: 29 % (ref 28–44)
ECHO LV INTERNAL DIMENSION DIASTOLE INDEX: 2.27 CM/M2
ECHO LV INTERNAL DIMENSION DIASTOLIC: 5.6 CM (ref 4.2–5.9)
ECHO LV INTERNAL DIMENSION SYSTOLIC INDEX: 1.62 CM/M2
ECHO LV INTERNAL DIMENSION SYSTOLIC: 4 CM
ECHO LV IVSD: 1.1 CM (ref 0.6–1)
ECHO LV MASS 2D: 219.7 G (ref 88–224)
ECHO LV MASS INDEX 2D: 89 G/M2 (ref 49–115)
ECHO LV POSTERIOR WALL DIASTOLIC: 0.9 CM (ref 0.6–1)
ECHO LV RELATIVE WALL THICKNESS RATIO: 0.32
ECHO LVOT AREA: 3.8 CM2
ECHO LVOT AV VTI INDEX: 0.7
ECHO LVOT DIAM: 2.2 CM
ECHO LVOT MEAN GRADIENT: 1 MMHG
ECHO LVOT PEAK GRADIENT: 2 MMHG
ECHO LVOT PEAK VELOCITY: 0.8 M/S
ECHO LVOT STROKE VOLUME INDEX: 21.1 ML/M2
ECHO LVOT SV: 52.1 ML
ECHO LVOT VTI: 13.7 CM
ECHO RV INTERNAL DIMENSION: 3.7 CM
ECHO RV TAPSE: 2.3 CM (ref 1.7–?)

## 2024-11-10 RX ORDER — ROSUVASTATIN CALCIUM 10 MG/1
TABLET, COATED ORAL
Qty: 90 TABLET | Refills: 0 | Status: SHIPPED | OUTPATIENT
Start: 2024-11-10

## 2024-11-12 ENCOUNTER — PATIENT MESSAGE (OUTPATIENT)
Age: 79
End: 2024-11-12

## 2024-11-25 ENCOUNTER — OFFICE VISIT (OUTPATIENT)
Age: 79
End: 2024-11-25
Payer: MEDICARE

## 2024-11-25 VITALS
DIASTOLIC BLOOD PRESSURE: 70 MMHG | OXYGEN SATURATION: 95 % | RESPIRATION RATE: 16 BRPM | SYSTOLIC BLOOD PRESSURE: 120 MMHG | HEIGHT: 74 IN | HEART RATE: 83 BPM | WEIGHT: 267 LBS | BODY MASS INDEX: 34.27 KG/M2

## 2024-11-25 DIAGNOSIS — I10 ESSENTIAL HYPERTENSION: ICD-10-CM

## 2024-11-25 DIAGNOSIS — I48.0 PAROXYSMAL ATRIAL FIBRILLATION (HCC): ICD-10-CM

## 2024-11-25 DIAGNOSIS — R42 DIZZINESS: Primary | ICD-10-CM

## 2024-11-25 PROCEDURE — G8484 FLU IMMUNIZE NO ADMIN: HCPCS | Performed by: NURSE PRACTITIONER

## 2024-11-25 PROCEDURE — 1159F MED LIST DOCD IN RCRD: CPT | Performed by: NURSE PRACTITIONER

## 2024-11-25 PROCEDURE — G8417 CALC BMI ABV UP PARAM F/U: HCPCS | Performed by: NURSE PRACTITIONER

## 2024-11-25 PROCEDURE — G8427 DOCREV CUR MEDS BY ELIG CLIN: HCPCS | Performed by: NURSE PRACTITIONER

## 2024-11-25 PROCEDURE — 99214 OFFICE O/P EST MOD 30 MIN: CPT | Performed by: NURSE PRACTITIONER

## 2024-11-25 PROCEDURE — 3074F SYST BP LT 130 MM HG: CPT | Performed by: NURSE PRACTITIONER

## 2024-11-25 PROCEDURE — 1126F AMNT PAIN NOTED NONE PRSNT: CPT | Performed by: NURSE PRACTITIONER

## 2024-11-25 PROCEDURE — 3078F DIAST BP <80 MM HG: CPT | Performed by: NURSE PRACTITIONER

## 2024-11-25 PROCEDURE — 1123F ACP DISCUSS/DSCN MKR DOCD: CPT | Performed by: NURSE PRACTITIONER

## 2024-11-25 PROCEDURE — 1036F TOBACCO NON-USER: CPT | Performed by: NURSE PRACTITIONER

## 2024-11-25 PROCEDURE — 1160F RVW MEDS BY RX/DR IN RCRD: CPT | Performed by: NURSE PRACTITIONER

## 2024-11-25 RX ORDER — TRAMADOL HYDROCHLORIDE 50 MG/1
TABLET ORAL
COMMUNITY

## 2024-11-25 RX ORDER — AMLODIPINE BESYLATE 5 MG/1
5 TABLET ORAL DAILY
Qty: 90 TABLET | Refills: 1 | Status: SHIPPED | OUTPATIENT
Start: 2024-11-25

## 2024-11-25 ASSESSMENT — PATIENT HEALTH QUESTIONNAIRE - PHQ9
SUM OF ALL RESPONSES TO PHQ QUESTIONS 1-9: 0
2. FEELING DOWN, DEPRESSED OR HOPELESS: NOT AT ALL
SUM OF ALL RESPONSES TO PHQ9 QUESTIONS 1 & 2: 0
SUM OF ALL RESPONSES TO PHQ QUESTIONS 1-9: 0
SUM OF ALL RESPONSES TO PHQ QUESTIONS 1-9: 0
9. THOUGHTS THAT YOU WOULD BE BETTER OFF DEAD, OR OF HURTING YOURSELF: NOT AT ALL
1. LITTLE INTEREST OR PLEASURE IN DOING THINGS: NOT AT ALL
SUM OF ALL RESPONSES TO PHQ QUESTIONS 1-9: 0
5. POOR APPETITE OR OVEREATING: NOT AT ALL
3. TROUBLE FALLING OR STAYING ASLEEP: NOT AT ALL
4. FEELING TIRED OR HAVING LITTLE ENERGY: NOT AT ALL
6. FEELING BAD ABOUT YOURSELF - OR THAT YOU ARE A FAILURE OR HAVE LET YOURSELF OR YOUR FAMILY DOWN: NOT AT ALL
7. TROUBLE CONCENTRATING ON THINGS, SUCH AS READING THE NEWSPAPER OR WATCHING TELEVISION: NOT AT ALL
8. MOVING OR SPEAKING SO SLOWLY THAT OTHER PEOPLE COULD HAVE NOTICED. OR THE OPPOSITE, BEING SO FIGETY OR RESTLESS THAT YOU HAVE BEEN MOVING AROUND A LOT MORE THAN USUAL: NOT AT ALL
10. IF YOU CHECKED OFF ANY PROBLEMS, HOW DIFFICULT HAVE THESE PROBLEMS MADE IT FOR YOU TO DO YOUR WORK, TAKE CARE OF THINGS AT HOME, OR GET ALONG WITH OTHER PEOPLE: NOT DIFFICULT AT ALL

## 2024-11-25 NOTE — PATIENT INSTRUCTIONS
Please reduce amlodipine to 5mg daily in the morning     Please check your blood pressure daily (at least one hour after your morning blood pressure medications.)  Keep a written record of your blood pressure readings and call the office or send a [employer] message with your readings in 2 weeks.  If your systolic blood pressure is consistently greater than 150mmHg or less than 100mmHg then please call the office at [phone].    Please call Dr. Gibson' office if you have not heard from them in 2 weeks about blood in stool and Chest CT results

## 2024-11-25 NOTE — PROGRESS NOTES
Simba Quintero     1945       Date of Visit-2024     PCP is Huang Gibson MD   Cardiologist: Ning Snowden MD, [facility]  [facility] [place]      HPI:  Simba Quintero is a 79 y.o. male   PMH: PAF hypertension dyslipidemia UC bladder cancer  GI bleed    His chief complaint is dizziness, says dizziness is constant and is a chronic problem  According to his wife, he has had ENT and neuro evaluations for this   He is not orthostatic on exam today, BP is normal, heart rate is normal  Hx of whitecoat HTN but BP is well controlled today  No chest pain or palpitations    He has mild chronic shortness of breath.    He uses a walker and is more dizzy when he stands, says he drinks 64 oz of water daily   Mild LE edema  Reports some dark stools, suspects he has blood in his stool  Had labs 7/15/2024 including hemoglobin A1c creatinine hemoglobin and TSH were all stable.       Atrial fibrillation-cardioversion 2020  Echo 2020 EF 55 to 60%  Dyslipidemia  went to 72 on rosuvastatin  Ulcerative colitis with colon resection   Carotids less than 50% bilateral stenosis in 2021  Nuclear stress test 2021 no ischemia  Echo : EF 55-60%  Holter Monitor : AF , 100% AF/AFL burden, 3 sec pause, low PVC burden    Social history-- remote tobacco use, quit  , smoked 35 years, drinks 14 alcoholic drinks a week, no kids, no hobbies  Family history-sister with pacemaker ,uncle with pacer. Mother  ovarian cancer at 80, father unknown, sister  PNA at 80    ROS -SOB, dizzy edema, incontinence, constipation, arthritis , numbness  Med hx spinal stenosis, varices  Intolerant to BB and valsartan,lisinopril and hctz    Assessment/Plan:     Patient Instructions   Please reduce amlodipine to 5mg daily in the morning     Please check your blood pressure daily (at least one hour after your morning blood pressure medications.)  Keep a

## 2024-12-15 SDOH — ECONOMIC STABILITY: FOOD INSECURITY: WITHIN THE PAST 12 MONTHS, YOU WORRIED THAT YOUR FOOD WOULD RUN OUT BEFORE YOU GOT MONEY TO BUY MORE.: NEVER TRUE

## 2024-12-15 SDOH — ECONOMIC STABILITY: INCOME INSECURITY: HOW HARD IS IT FOR YOU TO PAY FOR THE VERY BASICS LIKE FOOD, HOUSING, MEDICAL CARE, AND HEATING?: NOT HARD AT ALL

## 2024-12-15 SDOH — ECONOMIC STABILITY: FOOD INSECURITY: WITHIN THE PAST 12 MONTHS, THE FOOD YOU BOUGHT JUST DIDN'T LAST AND YOU DIDN'T HAVE MONEY TO GET MORE.: NEVER TRUE

## 2024-12-18 ENCOUNTER — OFFICE VISIT (OUTPATIENT)
Age: 79
End: 2024-12-18
Payer: MEDICARE

## 2024-12-18 VITALS — BODY MASS INDEX: 34.01 KG/M2 | WEIGHT: 265 LBS | HEIGHT: 74 IN

## 2024-12-18 DIAGNOSIS — I48.0 PAROXYSMAL ATRIAL FIBRILLATION (HCC): ICD-10-CM

## 2024-12-18 DIAGNOSIS — K92.1 BLACK TARRY STOOLS: ICD-10-CM

## 2024-12-18 DIAGNOSIS — K92.1 BLACK TARRY STOOLS: Primary | ICD-10-CM

## 2024-12-18 DIAGNOSIS — I10 ESSENTIAL (PRIMARY) HYPERTENSION: ICD-10-CM

## 2024-12-18 DIAGNOSIS — K51.319: ICD-10-CM

## 2024-12-18 LAB
ANION GAP SERPL CALC-SCNC: 6 MMOL/L (ref 2–12)
BASOPHILS # BLD: 0 K/UL (ref 0–0.1)
BASOPHILS NFR BLD: 1 % (ref 0–1)
BUN SERPL-MCNC: 21 MG/DL (ref 6–20)
BUN/CREAT SERPL: 20 (ref 12–20)
CALCIUM SERPL-MCNC: 9.6 MG/DL (ref 8.5–10.1)
CHLORIDE SERPL-SCNC: 107 MMOL/L (ref 97–108)
CO2 SERPL-SCNC: 27 MMOL/L (ref 21–32)
CREAT SERPL-MCNC: 1.06 MG/DL (ref 0.7–1.3)
DIFFERENTIAL METHOD BLD: ABNORMAL
EOSINOPHIL # BLD: 0.3 K/UL (ref 0–0.4)
EOSINOPHIL NFR BLD: 4 % (ref 0–7)
ERYTHROCYTE [DISTWIDTH] IN BLOOD BY AUTOMATED COUNT: 13.4 % (ref 11.5–14.5)
GLUCOSE SERPL-MCNC: 117 MG/DL (ref 65–100)
HCT VFR BLD AUTO: 36.6 % (ref 36.6–50.3)
HGB BLD-MCNC: 11.5 G/DL (ref 12.1–17)
IMM GRANULOCYTES # BLD AUTO: 0.1 K/UL (ref 0–0.04)
IMM GRANULOCYTES NFR BLD AUTO: 1 % (ref 0–0.5)
LYMPHOCYTES # BLD: 1.4 K/UL (ref 0.8–3.5)
LYMPHOCYTES NFR BLD: 16 % (ref 12–49)
MCH RBC QN AUTO: 32.3 PG (ref 26–34)
MCHC RBC AUTO-ENTMCNC: 31.4 G/DL (ref 30–36.5)
MCV RBC AUTO: 102.8 FL (ref 80–99)
MONOCYTES # BLD: 0.9 K/UL (ref 0–1)
MONOCYTES NFR BLD: 11 % (ref 5–13)
NEUTS SEG # BLD: 6 K/UL (ref 1.8–8)
NEUTS SEG NFR BLD: 67 % (ref 32–75)
NRBC # BLD: 0 K/UL (ref 0–0.01)
NRBC BLD-RTO: 0 PER 100 WBC
PLATELET # BLD AUTO: 268 K/UL (ref 150–400)
PMV BLD AUTO: 10.4 FL (ref 8.9–12.9)
POTASSIUM SERPL-SCNC: 4.4 MMOL/L (ref 3.5–5.1)
RBC # BLD AUTO: 3.56 M/UL (ref 4.1–5.7)
SODIUM SERPL-SCNC: 140 MMOL/L (ref 136–145)
WBC # BLD AUTO: 8.8 K/UL (ref 4.1–11.1)

## 2024-12-18 PROCEDURE — 1036F TOBACCO NON-USER: CPT | Performed by: INTERNAL MEDICINE

## 2024-12-18 PROCEDURE — 1160F RVW MEDS BY RX/DR IN RCRD: CPT | Performed by: INTERNAL MEDICINE

## 2024-12-18 PROCEDURE — 1123F ACP DISCUSS/DSCN MKR DOCD: CPT | Performed by: INTERNAL MEDICINE

## 2024-12-18 PROCEDURE — 99213 OFFICE O/P EST LOW 20 MIN: CPT | Performed by: INTERNAL MEDICINE

## 2024-12-18 PROCEDURE — 1159F MED LIST DOCD IN RCRD: CPT | Performed by: INTERNAL MEDICINE

## 2024-12-18 PROCEDURE — G8427 DOCREV CUR MEDS BY ELIG CLIN: HCPCS | Performed by: INTERNAL MEDICINE

## 2024-12-18 PROCEDURE — G8417 CALC BMI ABV UP PARAM F/U: HCPCS | Performed by: INTERNAL MEDICINE

## 2024-12-18 PROCEDURE — G8484 FLU IMMUNIZE NO ADMIN: HCPCS | Performed by: INTERNAL MEDICINE

## 2024-12-18 PROCEDURE — 1126F AMNT PAIN NOTED NONE PRSNT: CPT | Performed by: INTERNAL MEDICINE

## 2024-12-18 NOTE — PROGRESS NOTES
HPI:  Simba Quintero is a 79 y.o. year old male who is here for a follow up visit. Has noted some black stools for at least a month.  Bowel movements have been somewhat constipated.  He denies any nausea or vomiting.  No hematochezia.  No fevers or chills.  No change in bladder habits.  His back discomfort is under reasonable control.      Past Medical History:   Diagnosis Date    A-fib (HCC)     Acute pneumonia 2022    Allergic rhinitis     Anticoagulant long-term use     Anxiety     Arthritis     Atrial fibrillation (HCC)     Back pain     Bladder cancer (HCC)     CAD (coronary artery disease)     Cancer (HCC) 2019    Basal cell - face    Chronic back pain     Chronic pain lower back    plus many other joints    COPD (chronic obstructive pulmonary disease) (HCC)     Depression     Fatigue     History of basal cell carcinoma (BCC) of skin 07/01/2019    left side of face    Hyperlipidemia     Hypertension     Hypogonadism male     Memory disorder     Muscle weakness     Neuropathy     Obesity     Sleep apnea     Ulcerative colitis (HCC)     Urinary incontinence     Vertigo        Past Surgical History:   Procedure Laterality Date    BLADDER SURGERY N/A 01/16/2024    ROBOTIC LAPAROSCOPIC PARTIAL CYSTECTOMY WITH PELVIC LYMPH NODE DISSECTION AND CYSTOSCOPY (E R A S) performed by Jose Eduardo Mccain MD at [facility] MAIN OR    BOWEL RESECTION      2021    COLONOSCOPY  02/07/2017    3 yr f/u - Dr. Echevarria    CYSTOSCOPY N/A 10/01/2024    CYSTOSCOPY WITH BIOPSY OF BLADDER AND FULGERATION performed by Jose Eduardo Mccain MD at [facility] MAIN OR    EYE SURGERY Bilateral     CATARACTS    MOHS SURGERY Left 07/01/2019    basal cell carcinoma left side of face.    SKIN BIOPSY      SUBTOTAL COLECTOMY      TONSILLECTOMY      WISDOM TOOTH EXTRACTION         Prior to Admission medications    Medication Sig Start Date End Date Taking? Authorizing Provider   traMADol (ULTRAM) 50 MG tablet Take 1 tablet by mouth every six hours as needed for pain   Yes

## 2024-12-22 RX ORDER — MIRTAZAPINE 30 MG/1
30 TABLET, FILM COATED ORAL NIGHTLY
Qty: 90 TABLET | Refills: 2 | Status: SHIPPED | OUTPATIENT
Start: 2024-12-22

## 2024-12-23 DIAGNOSIS — R19.5 POSITIVE FECAL OCCULT BLOOD TEST: ICD-10-CM

## 2024-12-23 DIAGNOSIS — K92.1 BLACK TARRY STOOLS: Primary | ICD-10-CM

## 2024-12-23 LAB
HEMOCCULT STL QL: POSITIVE
VALID INTERNAL CONTROL, POC: YES

## 2024-12-23 PROCEDURE — PBSHW AMB POC BLOOD OCCULT QUAL FECAL HEMGLBN 1-3: Performed by: INTERNAL MEDICINE

## 2024-12-23 NOTE — PROGRESS NOTES
Orders Placed This Encounter    External Referral To Gastroenterology     Referral Priority:   Routine     Referral Type:   Eval and Treat     Referral Reason:   Specialty Services Required     Referred to Provider:   Randy Echevarria MD     Requested Specialty:   Gastroenterology     Number of Visits Requested:   1    AMB POC Fecal Immunochemical Test (FIT)       The above orders were approved via VOR[facility] per Dr. uHang Gibson, III.

## 2024-12-23 NOTE — PROGRESS NOTES
Referral, labs, and office note faxed to Dr. Echevarria's office.  Appt scheduled for Tuesday December 31st @ 2:00 PM.

## 2024-12-25 DIAGNOSIS — M48.061 SPINAL STENOSIS OF LUMBAR REGION, UNSPECIFIED WHETHER NEUROGENIC CLAUDICATION PRESENT: ICD-10-CM

## 2024-12-26 RX ORDER — PREGABALIN 75 MG/1
CAPSULE ORAL
Qty: 180 CAPSULE | Refills: 1 | Status: SHIPPED | OUTPATIENT
Start: 2024-12-26 | End: 2025-06-24

## 2025-01-15 ENCOUNTER — TELEPHONE (OUTPATIENT)
Age: 80
End: 2025-01-15

## 2025-01-15 NOTE — TELEPHONE ENCOUNTER
Dr Manjarrez with [facility] is asking for a call back from Dr Snowden to discuss new diagnosis.      Dr Manjarrez # [phone]

## 2025-01-16 NOTE — TELEPHONE ENCOUNTER
Spoke to Dr. Manjarrez at [facility].  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 Echevarria.  Patient's hemoglobin is about 11.6.  He is currently undergoing PET scan for staging.  CT showed a focal mass.  Patient is on Eliquis for persistent atrial fibrillation.  We discussed anticoagulation strategy.  From a cardiac point of view can stop Eliquis anytime we have dangerous low levels of her hemoglobin.  Currently not very anemic Dr. Manjarrez is planning IV iron because of low iron stores.  At this point we will continue Eliquis for the next couple weeks but as we approach chemotherapy continue to monitor hemoglobin and if dropping then Dr. Manjarrez will stop Eliquis.  Based on prognosis may consider for watchman at later date.    Will need preoperative assessment  Rufus please arrange for him to be seen in office within the next month or so you can add a Wednesday spot if you need  thanks  09/30/24    ECHO (TTE) COMPLETE (PRN CONTRAST/BUBBLE/STRAIN/3D) 10/03/2024  5:41 PM (Final)    Interpretation Summary    Left Ventricle: Normal left ventricular systolic function with a visually estimated EF of 60 - 65%. Left ventricle size is normal. Normal wall thickness. Normal wall motion.    Moderate left atrial enlargement    Right Atrium: Right atrium is mildly dilated.    Image quality is technically difficult. A contrast agent (Definity) was utilized to enhance endocardial visualization.    Overall left ventricular systolic function is strong and there is noted atrial dilation without significant valve disease.    Signed by: Ning Snowden MD on 10/3/2024  5:41 PM

## 2025-01-17 NOTE — TELEPHONE ENCOUNTER
Verified patient with two types of identifiers. Spoke to Mr. & Mrs. Quintero and scheduled follow up at . They were appreciative.

## 2025-01-22 ENCOUNTER — OFFICE VISIT (OUTPATIENT)
Age: 80
End: 2025-01-22
Payer: MEDICARE

## 2025-01-22 VITALS
HEART RATE: 86 BPM | RESPIRATION RATE: 16 BRPM | DIASTOLIC BLOOD PRESSURE: 60 MMHG | SYSTOLIC BLOOD PRESSURE: 120 MMHG | BODY MASS INDEX: 33.37 KG/M2 | WEIGHT: 260 LBS | OXYGEN SATURATION: 97 % | HEIGHT: 74 IN

## 2025-01-22 DIAGNOSIS — I48.11 LONGSTANDING PERSISTENT ATRIAL FIBRILLATION (HCC): Primary | ICD-10-CM

## 2025-01-22 DIAGNOSIS — I10 ESSENTIAL HYPERTENSION: ICD-10-CM

## 2025-01-22 DIAGNOSIS — D64.9 ANEMIA, UNSPECIFIED TYPE: ICD-10-CM

## 2025-01-22 DIAGNOSIS — R42 DIZZINESS: ICD-10-CM

## 2025-01-22 DIAGNOSIS — I35.8 AORTIC VALVE SCLEROSIS: ICD-10-CM

## 2025-01-22 PROCEDURE — 1123F ACP DISCUSS/DSCN MKR DOCD: CPT | Performed by: SPECIALIST

## 2025-01-22 PROCEDURE — 3074F SYST BP LT 130 MM HG: CPT | Performed by: SPECIALIST

## 2025-01-22 PROCEDURE — 93005 ELECTROCARDIOGRAM TRACING: CPT | Performed by: SPECIALIST

## 2025-01-22 PROCEDURE — 99214 OFFICE O/P EST MOD 30 MIN: CPT | Performed by: SPECIALIST

## 2025-01-22 PROCEDURE — 93010 ELECTROCARDIOGRAM REPORT: CPT | Performed by: SPECIALIST

## 2025-01-22 PROCEDURE — G8417 CALC BMI ABV UP PARAM F/U: HCPCS | Performed by: SPECIALIST

## 2025-01-22 PROCEDURE — 1036F TOBACCO NON-USER: CPT | Performed by: SPECIALIST

## 2025-01-22 PROCEDURE — G8427 DOCREV CUR MEDS BY ELIG CLIN: HCPCS | Performed by: SPECIALIST

## 2025-01-22 PROCEDURE — 3078F DIAST BP <80 MM HG: CPT | Performed by: SPECIALIST

## 2025-01-22 PROCEDURE — 1159F MED LIST DOCD IN RCRD: CPT | Performed by: SPECIALIST

## 2025-01-22 PROCEDURE — 1126F AMNT PAIN NOTED NONE PRSNT: CPT | Performed by: SPECIALIST

## 2025-01-22 RX ORDER — LANOLIN ALCOHOL/MO/W.PET/CERES
1000 CREAM (GRAM) TOPICAL DAILY
COMMUNITY

## 2025-01-22 ASSESSMENT — PATIENT HEALTH QUESTIONNAIRE - PHQ9
8. MOVING OR SPEAKING SO SLOWLY THAT OTHER PEOPLE COULD HAVE NOTICED. OR THE OPPOSITE, BEING SO FIGETY OR RESTLESS THAT YOU HAVE BEEN MOVING AROUND A LOT MORE THAN USUAL: NOT AT ALL
3. TROUBLE FALLING OR STAYING ASLEEP: NOT AT ALL
SUM OF ALL RESPONSES TO PHQ QUESTIONS 1-9: 0
10. IF YOU CHECKED OFF ANY PROBLEMS, HOW DIFFICULT HAVE THESE PROBLEMS MADE IT FOR YOU TO DO YOUR WORK, TAKE CARE OF THINGS AT HOME, OR GET ALONG WITH OTHER PEOPLE: NOT DIFFICULT AT ALL
2. FEELING DOWN, DEPRESSED OR HOPELESS: NOT AT ALL
9. THOUGHTS THAT YOU WOULD BE BETTER OFF DEAD, OR OF HURTING YOURSELF: NOT AT ALL
1. LITTLE INTEREST OR PLEASURE IN DOING THINGS: NOT AT ALL
SUM OF ALL RESPONSES TO PHQ QUESTIONS 1-9: 0
5. POOR APPETITE OR OVEREATING: NOT AT ALL
SUM OF ALL RESPONSES TO PHQ9 QUESTIONS 1 & 2: 0
6. FEELING BAD ABOUT YOURSELF - OR THAT YOU ARE A FAILURE OR HAVE LET YOURSELF OR YOUR FAMILY DOWN: NOT AT ALL
SUM OF ALL RESPONSES TO PHQ QUESTIONS 1-9: 0
4. FEELING TIRED OR HAVING LITTLE ENERGY: NOT AT ALL
7. TROUBLE CONCENTRATING ON THINGS, SUCH AS READING THE NEWSPAPER OR WATCHING TELEVISION: NOT AT ALL
SUM OF ALL RESPONSES TO PHQ QUESTIONS 1-9: 0

## 2025-01-22 NOTE — PATIENT INSTRUCTIONS
You have been scheduled for an echo.    We will send results on [employer] and see you back in 2 months.    You have been scheduled for a consultation with Dr. Torres to discuss a Watchman.

## 2025-01-29 PROBLEM — I35.8 AORTIC VALVE SCLEROSIS: Status: ACTIVE | Noted: 2025-01-29

## 2025-01-29 PROBLEM — R42 DIZZINESS: Status: ACTIVE | Noted: 2025-01-29

## 2025-01-29 PROBLEM — I48.11 LONGSTANDING PERSISTENT ATRIAL FIBRILLATION (HCC): Status: ACTIVE | Noted: 2025-01-29

## 2025-01-29 PROBLEM — I48.0 PAROXYSMAL ATRIAL FIBRILLATION (HCC): Status: RESOLVED | Noted: 2022-08-15 | Resolved: 2025-01-29

## 2025-02-07 RX ORDER — ROSUVASTATIN CALCIUM 10 MG/1
TABLET, COATED ORAL
Qty: 90 TABLET | Refills: 0 | Status: SHIPPED | OUTPATIENT
Start: 2025-02-07

## 2025-04-21 ENCOUNTER — OFFICE VISIT (OUTPATIENT)
Age: 80
End: 2025-04-21
Payer: MEDICARE

## 2025-04-21 VITALS
HEART RATE: 90 BPM | OXYGEN SATURATION: 97 % | BODY MASS INDEX: 33.38 KG/M2 | DIASTOLIC BLOOD PRESSURE: 70 MMHG | SYSTOLIC BLOOD PRESSURE: 126 MMHG | HEIGHT: 74 IN

## 2025-04-21 DIAGNOSIS — R42 DIZZINESS: ICD-10-CM

## 2025-04-21 DIAGNOSIS — I10 ESSENTIAL HYPERTENSION: ICD-10-CM

## 2025-04-21 DIAGNOSIS — C67.9 MALIGNANT NEOPLASM OF URINARY BLADDER, UNSPECIFIED SITE (HCC): ICD-10-CM

## 2025-04-21 DIAGNOSIS — I35.8 AORTIC VALVE SCLEROSIS: ICD-10-CM

## 2025-04-21 DIAGNOSIS — I48.11 LONGSTANDING PERSISTENT ATRIAL FIBRILLATION (HCC): Primary | ICD-10-CM

## 2025-04-21 DIAGNOSIS — D64.9 ANEMIA, UNSPECIFIED TYPE: ICD-10-CM

## 2025-04-21 PROCEDURE — 3078F DIAST BP <80 MM HG: CPT

## 2025-04-21 PROCEDURE — 1159F MED LIST DOCD IN RCRD: CPT

## 2025-04-21 PROCEDURE — 99214 OFFICE O/P EST MOD 30 MIN: CPT

## 2025-04-21 PROCEDURE — G8427 DOCREV CUR MEDS BY ELIG CLIN: HCPCS

## 2025-04-21 PROCEDURE — 1126F AMNT PAIN NOTED NONE PRSNT: CPT

## 2025-04-21 PROCEDURE — 1123F ACP DISCUSS/DSCN MKR DOCD: CPT

## 2025-04-21 PROCEDURE — 1036F TOBACCO NON-USER: CPT

## 2025-04-21 PROCEDURE — G8417 CALC BMI ABV UP PARAM F/U: HCPCS

## 2025-04-21 PROCEDURE — 3074F SYST BP LT 130 MM HG: CPT

## 2025-04-21 RX ORDER — SODIUM FLUORIDE 5 MG/G
PASTE, DENTIFRICE ORAL
COMMUNITY
Start: 2025-04-10

## 2025-04-21 ASSESSMENT — PATIENT HEALTH QUESTIONNAIRE - PHQ9
1. LITTLE INTEREST OR PLEASURE IN DOING THINGS: NOT AT ALL
SUM OF ALL RESPONSES TO PHQ QUESTIONS 1-9: 0
2. FEELING DOWN, DEPRESSED OR HOPELESS: NOT AT ALL
SUM OF ALL RESPONSES TO PHQ QUESTIONS 1-9: 0

## 2025-04-21 NOTE — PROGRESS NOTES
1. Have you been to the ER, urgent care clinic since your last visit?  Hospitalized since your last visit?  No    2. Have you seen or consulted any other health care providers outside of the [facility] since your last visit?  Include any pap smears or colon screening.   [facility]  
unspecified site (HCC)  -now with mass at GE junction  -has completed chemo and will now start radiation  Anemia, unspecified type  Bladder cancer now GE junction mass anemia followed by oncology.  Hemoglobin had been 12.7-15 and is now down to 11.5 in mid December.  Patient does not appear to have pallor.  Seeing GI Dr. Echevarria    Aortic valve sclerosis  Previous mild sclerosis.  No significant adverse murmur.  Echo does not show significant aortic valve abnormality.       Follow up with Dr. LUCI Snowden in 4 months.- sooner if needed.      HISTORY OF PRESENT ILLNESS:     History of Present Illness:  Simba Quintero is a 79 y.o. male     He last saw Dr. Snowden 1/29/25. Per his note: Simba Quintero is a 79 y.o. male   PAF, HTN, dyslipidemia, ulcerative colitis, bladder cancer, GI bleed 2/20/2024     Today..  Patient recently diagnosed with adenocarcinoma of the GE junction after endoscopy by Dr. Stevens.  He had been having dark tarry stools like melena since November.  Hemoglobin 11.6 undergoing PET scanning for staging.  Patient has been on Eliquis for persistent atrial fibrillation.  I spoke to Dr. Manjarrez on 1/16/2025 and recommended that if the hemoglobin drops below 10 that Eliquis could be stopped.  He was seen for perioperative assessment today.  Overall he feels generally well with mild symptoms reported below.  Patient reports palpitations dizziness shortness of breath.  The dizziness has been chronic and he seen ENT and neurology.  Previous studies have showed that he was not orthostatic and has whitecoat hypertension at times.  He is drinking regularly 64 ounces of fluid.  He saw [facility]. Ellington with [facility].  He denies chest pain tachycardia fever syncope edema abdominal pain or unexpected weight loss.  Review of scanned documents show that patient has had an abnormal PET scan in the gastric cardia.  Here with wife.    EKG: Atrial fibrillation at a rate of 86 right bundle branch block

## 2025-04-21 NOTE — ASSESSMENT & PLAN NOTE
Previous mild sclerosis.  No significant adverse murmur.  Echo does not show significant aortic valve abnormality.

## 2025-04-21 NOTE — ASSESSMENT & PLAN NOTE
Bladder cancer now GE junction mass anemia followed by oncology.  Hemoglobin had been 12.7-15 and is now down to 11.5 in mid December.  Patient does not appear to have pallor.  Seeing GI Dr. Echevarria

## 2025-04-21 NOTE — ASSESSMENT & PLAN NOTE
9/2024- Echo  Normal left ventricular systolic function with a visually estimated EF of 60 - 65%. Left ventricle size is normal. Normal wall thickness. Normal wall motion.  Moderate left atrial enlargement  Right atrium is mildly dilated.  Prior cardioversion 2020 now with persistent atrial fibrillation by Holter July 2024 and currently in atrial fibrillation.  Rate is well-controlled.  Continue Toprol- mg in the evening.  Now with a second cancer previously of bladder cancer now with a mass at the GE junction undergoing workup by heme-onc.  We are asked to see patient prior to possible surgery for resection and for continued oncology therapies.  An echocardiogram will be obtained.  Per Dr. Snowden note: Anticoagulation will now be the next issue.  Would continue Eliquis as long as hemoglobin remains above 10 though recognized with melena it is not unreasonable to stop the Eliquis sooner.  Previously discussed with Dr. Manjarrez.     His risk score is 3 with age and hypertension.    Favor eventual Watchman given cancer diagnoses.  Will schedule consultation with Dr. Torres to discuss.  The timing of the watchman will be later in the year because for watchman he will need 45 days of anticoagulation and that may not be possible given upcoming therapies.    Can stop aspirin    No signs of bleeding    Echo 1/30/25 EF 55% normal wall motion, trace MR, trace TR

## 2025-05-07 RX ORDER — ROSUVASTATIN CALCIUM 10 MG/1
TABLET, COATED ORAL
Qty: 90 TABLET | Refills: 0 | Status: SHIPPED | OUTPATIENT
Start: 2025-05-07

## 2025-05-09 ENCOUNTER — TELEPHONE (OUTPATIENT)
Age: 80
End: 2025-05-09

## 2025-05-09 NOTE — TELEPHONE ENCOUNTER
Attempted to contact patient regarding upcoming Medicare wellness appointment and completion of HRA questionnaire. LVM for patient to please return call at  [phone], mcm sent as well.

## 2025-05-10 SDOH — ECONOMIC STABILITY: INCOME INSECURITY: IN THE LAST 12 MONTHS, WAS THERE A TIME WHEN YOU WERE NOT ABLE TO PAY THE MORTGAGE OR RENT ON TIME?: NO

## 2025-05-10 SDOH — HEALTH STABILITY: PHYSICAL HEALTH: ON AVERAGE, HOW MANY DAYS PER WEEK DO YOU ENGAGE IN MODERATE TO STRENUOUS EXERCISE (LIKE A BRISK WALK)?: 0 DAYS

## 2025-05-10 SDOH — ECONOMIC STABILITY: FOOD INSECURITY: WITHIN THE PAST 12 MONTHS, THE FOOD YOU BOUGHT JUST DIDN'T LAST AND YOU DIDN'T HAVE MONEY TO GET MORE.: NEVER TRUE

## 2025-05-10 SDOH — ECONOMIC STABILITY: FOOD INSECURITY: WITHIN THE PAST 12 MONTHS, YOU WORRIED THAT YOUR FOOD WOULD RUN OUT BEFORE YOU GOT MONEY TO BUY MORE.: NEVER TRUE

## 2025-05-10 SDOH — ECONOMIC STABILITY: TRANSPORTATION INSECURITY
IN THE PAST 12 MONTHS, HAS THE LACK OF TRANSPORTATION KEPT YOU FROM MEDICAL APPOINTMENTS OR FROM GETTING MEDICATIONS?: NO

## 2025-05-10 ASSESSMENT — PATIENT HEALTH QUESTIONNAIRE - PHQ9
SUM OF ALL RESPONSES TO PHQ QUESTIONS 1-9: 2
SUM OF ALL RESPONSES TO PHQ QUESTIONS 1-9: 2
2. FEELING DOWN, DEPRESSED OR HOPELESS: SEVERAL DAYS
SUM OF ALL RESPONSES TO PHQ QUESTIONS 1-9: 2
SUM OF ALL RESPONSES TO PHQ QUESTIONS 1-9: 2
1. LITTLE INTEREST OR PLEASURE IN DOING THINGS: SEVERAL DAYS

## 2025-05-10 ASSESSMENT — LIFESTYLE VARIABLES
HOW OFTEN DURING THE LAST YEAR HAVE YOU FAILED TO DO WHAT WAS NORMALLY EXPECTED FROM YOU BECAUSE OF DRINKING: NEVER
HOW OFTEN DURING THE LAST YEAR HAVE YOU NEEDED AN ALCOHOLIC DRINK FIRST THING IN THE MORNING TO GET YOURSELF GOING AFTER A NIGHT OF HEAVY DRINKING: NEVER
HOW OFTEN DO YOU HAVE A DRINK CONTAINING ALCOHOL: 4 OR MORE TIMES A WEEK
HAVE YOU OR SOMEONE ELSE BEEN INJURED AS A RESULT OF YOUR DRINKING: NO
HOW OFTEN DO YOU HAVE SIX OR MORE DRINKS ON ONE OCCASION: 5
HOW MANY STANDARD DRINKS CONTAINING ALCOHOL DO YOU HAVE ON A TYPICAL DAY: 5
HAS A RELATIVE, FRIEND, DOCTOR, OR ANOTHER HEALTH PROFESSIONAL EXPRESSED CONCERN ABOUT YOUR DRINKING OR SUGGESTED YOU CUT DOWN: NO
HOW OFTEN DURING THE LAST YEAR HAVE YOU FOUND THAT YOU WERE NOT ABLE TO STOP DRINKING ONCE YOU HAD STARTED: NEVER
HOW OFTEN DURING THE LAST YEAR HAVE YOU HAD A FEELING OF GUILT OR REMORSE AFTER DRINKING: NEVER
HOW OFTEN DURING THE LAST YEAR HAVE YOU FOUND THAT YOU WERE NOT ABLE TO STOP DRINKING ONCE YOU HAD STARTED: NEVER
HOW OFTEN DURING THE LAST YEAR HAVE YOU NEEDED AN ALCOHOLIC DRINK FIRST THING IN THE MORNING TO GET YOURSELF GOING AFTER A NIGHT OF HEAVY DRINKING: NEVER
HOW OFTEN DURING THE LAST YEAR HAVE YOU BEEN UNABLE TO REMEMBER WHAT HAPPENED THE NIGHT BEFORE BECAUSE YOU HAD BEEN DRINKING: NEVER
HOW OFTEN DURING THE LAST YEAR HAVE YOU BEEN UNABLE TO REMEMBER WHAT HAPPENED THE NIGHT BEFORE BECAUSE YOU HAD BEEN DRINKING: NEVER
HAVE YOU OR SOMEONE ELSE BEEN INJURED AS A RESULT OF YOUR DRINKING: NO
HOW OFTEN DURING THE LAST YEAR HAVE YOU FAILED TO DO WHAT WAS NORMALLY EXPECTED FROM YOU BECAUSE OF DRINKING: NEVER
HOW OFTEN DO YOU HAVE A DRINK CONTAINING ALCOHOL: 5
HAS A RELATIVE, FRIEND, DOCTOR, OR ANOTHER HEALTH PROFESSIONAL EXPRESSED CONCERN ABOUT YOUR DRINKING OR SUGGESTED YOU CUT DOWN: NO
HOW MANY STANDARD DRINKS CONTAINING ALCOHOL DO YOU HAVE ON A TYPICAL DAY: 10 OR MORE
HOW OFTEN DURING THE LAST YEAR HAVE YOU HAD A FEELING OF GUILT OR REMORSE AFTER DRINKING: NEVER

## 2025-05-13 ENCOUNTER — OFFICE VISIT (OUTPATIENT)
Age: 80
End: 2025-05-13
Payer: MEDICARE

## 2025-05-13 VITALS
BODY MASS INDEX: 33.38 KG/M2 | HEIGHT: 74 IN | RESPIRATION RATE: 15 BRPM | HEART RATE: 71 BPM | DIASTOLIC BLOOD PRESSURE: 72 MMHG | TEMPERATURE: 98.8 F | OXYGEN SATURATION: 96 % | SYSTOLIC BLOOD PRESSURE: 109 MMHG

## 2025-05-13 DIAGNOSIS — R26.81 UNSTEADY GAIT: ICD-10-CM

## 2025-05-13 DIAGNOSIS — K51.919 ULCERATIVE COLITIS WITH COMPLICATION, UNSPECIFIED LOCATION (HCC): ICD-10-CM

## 2025-05-13 DIAGNOSIS — F33.1 MAJOR DEPRESSIVE DISORDER, RECURRENT, MODERATE (HCC): ICD-10-CM

## 2025-05-13 DIAGNOSIS — I10 ESSENTIAL HYPERTENSION: ICD-10-CM

## 2025-05-13 DIAGNOSIS — L72.3 SEBACEOUS CYST: ICD-10-CM

## 2025-05-13 DIAGNOSIS — C15.5 MALIGNANT NEOPLASM OF LOWER THIRD OF ESOPHAGUS (HCC): ICD-10-CM

## 2025-05-13 DIAGNOSIS — C67.9 MALIGNANT NEOPLASM OF URINARY BLADDER, UNSPECIFIED SITE (HCC): ICD-10-CM

## 2025-05-13 DIAGNOSIS — Z00.00 MEDICARE ANNUAL WELLNESS VISIT, SUBSEQUENT: Primary | ICD-10-CM

## 2025-05-13 DIAGNOSIS — I48.11 LONGSTANDING PERSISTENT ATRIAL FIBRILLATION (HCC): ICD-10-CM

## 2025-05-13 DIAGNOSIS — K51.319: ICD-10-CM

## 2025-05-13 PROBLEM — L97.529 TOE ULCER, LEFT, WITH UNSPECIFIED SEVERITY (HCC): Status: RESOLVED | Noted: 2023-02-23 | Resolved: 2025-05-13

## 2025-05-13 PROCEDURE — 3078F DIAST BP <80 MM HG: CPT | Performed by: INTERNAL MEDICINE

## 2025-05-13 PROCEDURE — G0439 PPPS, SUBSEQ VISIT: HCPCS | Performed by: INTERNAL MEDICINE

## 2025-05-13 PROCEDURE — 1159F MED LIST DOCD IN RCRD: CPT | Performed by: INTERNAL MEDICINE

## 2025-05-13 PROCEDURE — G8427 DOCREV CUR MEDS BY ELIG CLIN: HCPCS | Performed by: INTERNAL MEDICINE

## 2025-05-13 PROCEDURE — 1036F TOBACCO NON-USER: CPT | Performed by: INTERNAL MEDICINE

## 2025-05-13 PROCEDURE — 1126F AMNT PAIN NOTED NONE PRSNT: CPT | Performed by: INTERNAL MEDICINE

## 2025-05-13 PROCEDURE — 1123F ACP DISCUSS/DSCN MKR DOCD: CPT | Performed by: INTERNAL MEDICINE

## 2025-05-13 PROCEDURE — 3074F SYST BP LT 130 MM HG: CPT | Performed by: INTERNAL MEDICINE

## 2025-05-13 PROCEDURE — 99214 OFFICE O/P EST MOD 30 MIN: CPT | Performed by: INTERNAL MEDICINE

## 2025-05-13 PROCEDURE — G8417 CALC BMI ABV UP PARAM F/U: HCPCS | Performed by: INTERNAL MEDICINE

## 2025-05-13 RX ORDER — LANOLIN ALCOHOL/MO/W.PET/CERES
1000 CREAM (GRAM) TOPICAL DAILY
COMMUNITY

## 2025-05-13 NOTE — PATIENT INSTRUCTIONS
canned soups, and other high-salt, high-fat, processed foods.     Read food labels and try to avoid saturated and trans fats. They increase your risk of heart disease by raising cholesterol levels.     Limit the amount of solid fat--butter, margarine, and shortening--you eat. Use olive, peanut, or canola oil when you cook. Bake, broil, and steam foods instead of frying them.     Eat a variety of fruit and vegetables every day. Dark green, deep orange, red, or yellow fruits and vegetables are especially good for you. Examples include spinach, carrots, peaches, and berries.     Foods high in fiber can reduce your cholesterol and provide important vitamins and minerals. High-fiber foods include whole-grain cereals and breads, oatmeal, beans, brown rice, citrus fruits, and apples.     Eat lean proteins. Heart-healthy proteins include seafood, lean meats and poultry, eggs, beans, peas, nuts, seeds, and soy products.     Limit drinks and foods with added sugar. These include candy, desserts, and soda pop.   Heart-healthy lifestyle    If your doctor recommends it, get more exercise. For many people, walking is a good choice. Or you may want to swim, bike, or do other activities. Bit by bit, increase the time you're active every day. Try for at least 30 minutes on most days of the week.     Try to quit or cut back on using tobacco and other nicotine products. This includes smoking and vaping. If you need help quitting, talk to your doctor about stop-smoking programs and medicines. These can increase your chances of quitting for good. Quitting is one of the most important things you can do to protect your heart. It is never too late to quit. Try to avoid secondhand smoke too.     Stay at a weight that's healthy for you. Talk to your doctor if you need help losing weight.     Try to get 7 to 9 hours of sleep each night.     Limit alcohol to 2 drinks a day for men and 1 drink a day for women. Too much alcohol can cause health

## 2025-05-13 NOTE — PROGRESS NOTES
within the past year?: (!) (Patient-Rptd) No  Interventions:   Patient encouraged to make appointment with their eye specialist     ADL's:   Patient reports needing help with:  Select all that apply: (!) (Patient-Rptd) Housekeeping, Banking/Finances, Shopping, Food Preparation, Transportation  Interventions:  See AVS for additional education material                  Objective   Vitals:    05/13/25 1303   BP: 109/72   Pulse: 71   Resp: 15   Temp: 98.8 °F (37.1 °C)   SpO2: 96%   Height: 1.88 m (6' 2\")      Body mass index is 33.38 kg/m².        General Appearance: alert and oriented to person, place and time, well-developed and well-nourished, in no acute distress  Head: normocephalic and atraumatic and golf ball sized cyst on the left occiput.  Some small amount of clear drainage.  No fluctuance or erythema.  ENT: tympanic membrane, external ear and ear canal normal bilaterally, oropharynx clear and moist with normal mucous membranes  Neck: neck supple and non tender without mass, no thyromegaly or thyroid nodules, no cervical lymphadenopathy   Pulmonary/Chest: clear to auscultation bilaterally- no wheezes, rales or rhonchi, normal air movement, no respiratory distress  Cardiovascular: normal rate, normal S1 and S2, no gallops, intact distal pulses, and no carotid bruits  Abdomen: soft, non-tender, non-distended, normal bowel sounds, no masses or organomegaly  Extremities: no cyanosis and no clubbing  Musculoskeletal: normal range of motion, no joint swelling, deformity or tenderness  Neurologic: gait and coordination normal and speech normal            Allergies   Allergen Reactions    Lanolin Itching     Prior to Visit Medications    Medication Sig Taking? Authorizing Provider   vitamin B-12 (CYANOCOBALAMIN) 1000 MCG tablet Take 1 tablet by mouth daily Yes Provider, MD Cam   rosuvastatin (CRESTOR) 10 MG tablet TAKE 1 TABLET BY MOUTH ONCE NIGHTLY Yes Huang Gibson MD   pregabalin (LYRICA) 75 MG capsule

## 2025-06-11 DIAGNOSIS — I48.11 LONGSTANDING PERSISTENT ATRIAL FIBRILLATION (HCC): ICD-10-CM

## 2025-06-11 RX ORDER — METOPROLOL SUCCINATE 50 MG/1
50 TABLET, EXTENDED RELEASE ORAL NIGHTLY
Qty: 90 TABLET | Refills: 0 | Status: SHIPPED | OUTPATIENT
Start: 2025-06-11

## 2025-06-11 NOTE — TELEPHONE ENCOUNTER
Cardiologist: Dr. Jalloh    Future Appointments   Date Time Provider Department Center   7/7/2025  1:00 PM Jef Torres MD [facility]   8/6/2025  8:20 AM Sandra Jalloh MD [facility]   5/18/2026  2:30 PM [facility]       Requested Prescriptions     Signed Prescriptions Disp Refills    metoprolol succinate (TOPROL XL) 50 MG extended release tablet 90 tablet 0     Sig: TAKE ONE TABLET BY MOUTH EVERY EVENING     Authorizing Provider: SANDRA JALLOH     Ordering User: JOESPH CHRISTENSEN         Refills VO per Dr Jalloh.

## 2025-06-17 ENCOUNTER — TELEPHONE (OUTPATIENT)
Age: 80
End: 2025-06-17

## 2025-06-30 DIAGNOSIS — M48.061 SPINAL STENOSIS OF LUMBAR REGION, UNSPECIFIED WHETHER NEUROGENIC CLAUDICATION PRESENT: ICD-10-CM

## 2025-06-30 RX ORDER — PREGABALIN 75 MG/1
CAPSULE ORAL
Qty: 180 CAPSULE | Refills: 2 | Status: SHIPPED | OUTPATIENT
Start: 2025-06-30 | End: 2025-12-27

## 2025-06-30 NOTE — TELEPHONE ENCOUNTER
Chief Complaint   Patient presents with    Medication Refill     Last Appointment with Huang Gibson MD:  5/13/2025   Future Appointments   Date Time Provider Department Center   8/6/2025  8:20 AM Ning Snowden MD [facility]   9/30/2025  9:00 AM Jef Torres MD [facility]   5/18/2026  2:30 PM Huang Gibson MD [facility] DEP

## 2025-07-11 ENCOUNTER — PATIENT MESSAGE (OUTPATIENT)
Age: 80
End: 2025-07-11

## 2025-07-15 ENCOUNTER — TELEPHONE (OUTPATIENT)
Age: 80
End: 2025-07-15

## 2025-07-15 NOTE — TELEPHONE ENCOUNTER
Patient scheduled for EGD with Dr. Randy Echevarria at [facility]. Requesting cardiac clearance and instructions for eliquis. To fax clearance to [phone].

## 2025-07-15 NOTE — TELEPHONE ENCOUNTER
Faxed cardiac clearance letter for removal of large sebaceous cyst to Dr. Arshad office at [phone]. Fax confirmation received.

## 2025-07-16 NOTE — TELEPHONE ENCOUNTER
I have no objection to the planned  surgery. Patient seems to be at a low risk for toni-operative severe adverse cardiac events.   Patient can stop OAC 2 days prior to procedure and resume one day later.    Future Appointments   Date Time Provider Department Center   7/28/2025  9:30 AM [facility] PAT EXAM RM 5 [facility]   8/6/2025  8:20 AM Ning Snowden MD [facility]   9/30/2025  9:00 AM Jef Torres MD [facility]   5/18/2026  2:30 PM Huang Gibson MD [facility] DEP         Current Outpatient Medications:     pregabalin (LYRICA) 75 MG capsule, TAKE 1 CAPSULE BY MOUTH 2 TIMES A DAY (MAX DAILY AMOUNT IS 2 CAPSULES), Disp: 180 capsule, Rfl: 2    metoprolol succinate (TOPROL XL) 50 MG extended release tablet, TAKE ONE TABLET BY MOUTH EVERY EVENING, Disp: 90 tablet, Rfl: 0    vitamin B-12 (CYANOCOBALAMIN) 1000 MCG tablet, Take 1 tablet by mouth daily, Disp: , Rfl:     rosuvastatin (CRESTOR) 10 MG tablet, TAKE 1 TABLET BY MOUTH ONCE NIGHTLY, Disp: 90 tablet, Rfl: 0    mirtazapine (REMERON) 30 MG tablet, Take 1 tablet by mouth nightly, Disp: 90 tablet, Rfl: 2    traMADol (ULTRAM) 50 MG tablet, Take 1 tablet by mouth every six hours as needed for pain, Disp: , Rfl:     Fiber 500 MG CAPS, Take by mouth (Patient not taking: Reported on 5/13/2025), Disp: , Rfl:     apixaban (ELIQUIS) 5 MG TABS tablet, Take 1 tablet by mouth 2 times daily, Disp: 180 tablet, Rfl: 3    finasteride (PROSCAR) 5 MG tablet, Take 1 tablet by mouth daily, Disp: , Rfl:     tamsulosin (FLOMAX) 0.4 MG capsule, Take 1 capsule by mouth Daily with supper, Disp: , Rfl:

## 2025-07-17 NOTE — TELEPHONE ENCOUNTER
No answer at fax number  [phone]. Called office. To send to [phone].  Confirmation received.

## 2025-07-28 ENCOUNTER — HOSPITAL ENCOUNTER (OUTPATIENT)
Facility: HOSPITAL | Age: 80
Discharge: HOME | End: 2025-07-31
Payer: MEDICARE

## 2025-07-28 VITALS
DIASTOLIC BLOOD PRESSURE: 75 MMHG | HEIGHT: 74 IN | BODY MASS INDEX: 30.8 KG/M2 | RESPIRATION RATE: 18 BRPM | TEMPERATURE: 97.5 F | SYSTOLIC BLOOD PRESSURE: 137 MMHG | HEART RATE: 109 BPM | WEIGHT: 240 LBS

## 2025-07-28 LAB
BASOPHILS # BLD: 0.03 K/UL (ref 0–0.1)
BASOPHILS NFR BLD: 0.6 % (ref 0–1)
DIFFERENTIAL METHOD BLD: ABNORMAL
EKG DIAGNOSIS: NORMAL
EKG Q-T INTERVAL: 394 MS
EKG QRS DURATION: 130 MS
EKG QTC CALCULATION (BAZETT): 462 MS
EKG R AXIS: 3 DEGREES
EKG T AXIS: 17 DEGREES
EKG VENTRICULAR RATE: 83 BPM
EOSINOPHIL # BLD: 0.3 K/UL (ref 0–0.4)
EOSINOPHIL NFR BLD: 6.1 % (ref 0–7)
ERYTHROCYTE [DISTWIDTH] IN BLOOD BY AUTOMATED COUNT: 15.1 % (ref 11.5–14.5)
HCT VFR BLD AUTO: 37.1 % (ref 36.6–50.3)
HGB BLD-MCNC: 12.1 G/DL (ref 12.1–17)
IMM GRANULOCYTES # BLD AUTO: 0.02 K/UL (ref 0–0.04)
IMM GRANULOCYTES NFR BLD AUTO: 0.4 % (ref 0–0.5)
LYMPHOCYTES # BLD: 0.72 K/UL (ref 0.8–3.5)
LYMPHOCYTES NFR BLD: 14.7 % (ref 12–49)
MCH RBC QN AUTO: 35.2 PG (ref 26–34)
MCHC RBC AUTO-ENTMCNC: 32.6 G/DL (ref 30–36.5)
MCV RBC AUTO: 107.8 FL (ref 80–99)
MONOCYTES # BLD: 0.55 K/UL (ref 0–1)
MONOCYTES NFR BLD: 11.2 % (ref 5–13)
NEUTS SEG # BLD: 3.28 K/UL (ref 1.8–8)
NEUTS SEG NFR BLD: 67 % (ref 32–75)
NRBC # BLD: 0 K/UL (ref 0–0.01)
NRBC BLD-RTO: 0 PER 100 WBC
PLATELET # BLD AUTO: 139 K/UL (ref 150–400)
PMV BLD AUTO: 10.2 FL (ref 8.9–12.9)
RBC # BLD AUTO: 3.44 M/UL (ref 4.1–5.7)
RBC MORPH BLD: ABNORMAL
WBC # BLD AUTO: 4.9 K/UL (ref 4.1–11.1)

## 2025-07-28 PROCEDURE — 93005 ELECTROCARDIOGRAM TRACING: CPT | Performed by: SURGERY

## 2025-07-28 PROCEDURE — 85025 COMPLETE CBC W/AUTO DIFF WBC: CPT

## 2025-07-28 RX ORDER — CIPROFLOXACIN 250 MG/1
250 TABLET, FILM COATED ORAL 2 TIMES DAILY
COMMUNITY

## 2025-07-28 RX ORDER — PANTOPRAZOLE SODIUM 40 MG/1
40 TABLET, DELAYED RELEASE ORAL
COMMUNITY

## 2025-07-28 NOTE — PERIOP NOTE
[address] [address]   MAIN OR                                  [phone]    MAIN PRE OP             [phone]                                                                                AMBULATORY PRE OP          [phone]  PRE-ADMISSION TESTING    [phone]     Surgery Date:  8/11/25       Is surgery arrival time given by surgeon?  YES  NO    If “NO”, [facility] staff will call you between 4 and 7pm the day before your surgery with your arrival time. (If your surgery is on a Monday, we will call you the Friday before.)    Call [phone] after 7pm Monday-Friday if you did not receive this call.    INSTRUCTIONS BEFORE YOUR SURGERY   When You  Arrive Arrive at [facility] Patient Access on 1st floor the day of your surgery.   Medications to   TAKE   Morning of Surgery MEDICATIONS TO TAKE THE MORNING OF SURGERY WITH A SIP OF WATER: PREGABALIN, FINASTERIDE, CIPRO (IF STILL TAKING)    You may take these medications, IF NEEDED, the morning of surgery: MAY TAKE TRAMADOL 4 HRS PRIOR TO ARRIVAL TIME IF NEEDED.  Ask your surgeon/prescribing doctor for instructions on taking or stopping these medications prior to surgery: ELIQUIS (STOP 2 DAYS PRIOR TO SURGERY PER DR JALLOH' S CLEARANCE NOTE)   Medications to STOP  before surgery Non-Steroidal anti-inflammatory Drugs (NSAID's): for example, Diclofenac (Voltaren), Ibuprofen (Advil, Motrin), Naproxen (Aleve) 3 days  STOP all herbal supplements and vitamins(unless prescribed by your doctor), and fish oil for 7 days  Other: NONE  (Pain medications not listed above, including Tylenol may be taken up until 4 hours prior to arrival time)   Blood  Thinners If you take Aspirin, Eliquis, Plavix, Coumadin, or any blood-thinning or anti-blood clot medicine, talk to the doctor who prescribed the medications for pre-operative instructions.  If you take aspirin or aspirin containing products for pain,

## 2025-07-28 NOTE — PERIOP NOTE
LESION IS ON LEFT PARIETAL REGION.  ORDER AND POSTING STATES RIGHT.    SPOKE WITH YUE AT DR MANNING'S OFFICE WHO VARIFIED LESION IS ON LEFT SIDE.  CONSENT NOT SIGNED IN PAT.    YUE STATED SHE WILL SEND NEW ORDERS AND HAVE POSTING CORRECT LATERALITY.    ALSO NOTIFIED YUE PT WAS DX/D WITH UTI AND STARTED ON CIPRO 25OMG BID 7/23/25 BY ONCOLOGIST    PT UNABLE TO SHOWER.  PT GIVEN CHG WIPES AND INSTRUCTIONS.  INSTRUCTIONS REVIEWED WITH PATIENT.

## 2025-08-06 ENCOUNTER — OFFICE VISIT (OUTPATIENT)
Age: 80
End: 2025-08-06
Payer: MEDICARE

## 2025-08-06 VITALS
HEIGHT: 74 IN | BODY MASS INDEX: 30.8 KG/M2 | DIASTOLIC BLOOD PRESSURE: 70 MMHG | OXYGEN SATURATION: 96 % | HEART RATE: 69 BPM | RESPIRATION RATE: 16 BRPM | SYSTOLIC BLOOD PRESSURE: 130 MMHG | WEIGHT: 240 LBS

## 2025-08-06 DIAGNOSIS — I35.8 AORTIC VALVE SCLEROSIS: ICD-10-CM

## 2025-08-06 DIAGNOSIS — I48.11 LONGSTANDING PERSISTENT ATRIAL FIBRILLATION (HCC): Primary | ICD-10-CM

## 2025-08-06 DIAGNOSIS — R42 DIZZINESS: ICD-10-CM

## 2025-08-06 DIAGNOSIS — D64.9 ANEMIA, UNSPECIFIED TYPE: ICD-10-CM

## 2025-08-06 DIAGNOSIS — I10 ESSENTIAL HYPERTENSION: ICD-10-CM

## 2025-08-06 PROCEDURE — 3078F DIAST BP <80 MM HG: CPT | Performed by: SPECIALIST

## 2025-08-06 PROCEDURE — G8428 CUR MEDS NOT DOCUMENT: HCPCS | Performed by: SPECIALIST

## 2025-08-06 PROCEDURE — 1126F AMNT PAIN NOTED NONE PRSNT: CPT | Performed by: SPECIALIST

## 2025-08-06 PROCEDURE — 3075F SYST BP GE 130 - 139MM HG: CPT | Performed by: SPECIALIST

## 2025-08-06 PROCEDURE — 1036F TOBACCO NON-USER: CPT | Performed by: SPECIALIST

## 2025-08-06 PROCEDURE — G8417 CALC BMI ABV UP PARAM F/U: HCPCS | Performed by: SPECIALIST

## 2025-08-06 PROCEDURE — 99214 OFFICE O/P EST MOD 30 MIN: CPT | Performed by: SPECIALIST

## 2025-08-06 PROCEDURE — 1123F ACP DISCUSS/DSCN MKR DOCD: CPT | Performed by: SPECIALIST

## 2025-08-06 RX ORDER — METOPROLOL SUCCINATE 25 MG/1
25 TABLET, EXTENDED RELEASE ORAL NIGHTLY
Qty: 90 TABLET | Refills: 3 | Status: SHIPPED | OUTPATIENT
Start: 2025-08-06

## 2025-08-06 ASSESSMENT — PATIENT HEALTH QUESTIONNAIRE - PHQ9
6. FEELING BAD ABOUT YOURSELF - OR THAT YOU ARE A FAILURE OR HAVE LET YOURSELF OR YOUR FAMILY DOWN: NOT AT ALL
4. FEELING TIRED OR HAVING LITTLE ENERGY: NOT AT ALL
9. THOUGHTS THAT YOU WOULD BE BETTER OFF DEAD, OR OF HURTING YOURSELF: NOT AT ALL
3. TROUBLE FALLING OR STAYING ASLEEP: NOT AT ALL
SUM OF ALL RESPONSES TO PHQ QUESTIONS 1-9: 0
10. IF YOU CHECKED OFF ANY PROBLEMS, HOW DIFFICULT HAVE THESE PROBLEMS MADE IT FOR YOU TO DO YOUR WORK, TAKE CARE OF THINGS AT HOME, OR GET ALONG WITH OTHER PEOPLE: NOT DIFFICULT AT ALL
5. POOR APPETITE OR OVEREATING: NOT AT ALL
7. TROUBLE CONCENTRATING ON THINGS, SUCH AS READING THE NEWSPAPER OR WATCHING TELEVISION: NOT AT ALL
SUM OF ALL RESPONSES TO PHQ QUESTIONS 1-9: 0
1. LITTLE INTEREST OR PLEASURE IN DOING THINGS: NOT AT ALL
SUM OF ALL RESPONSES TO PHQ QUESTIONS 1-9: 0
SUM OF ALL RESPONSES TO PHQ QUESTIONS 1-9: 0
8. MOVING OR SPEAKING SO SLOWLY THAT OTHER PEOPLE COULD HAVE NOTICED. OR THE OPPOSITE, BEING SO FIGETY OR RESTLESS THAT YOU HAVE BEEN MOVING AROUND A LOT MORE THAN USUAL: NOT AT ALL
2. FEELING DOWN, DEPRESSED OR HOPELESS: NOT AT ALL

## 2025-08-10 DIAGNOSIS — I48.11 LONGSTANDING PERSISTENT ATRIAL FIBRILLATION (HCC): ICD-10-CM

## 2025-08-10 RX ORDER — DIPHENHYDRAMINE HYDROCHLORIDE 50 MG/ML
12.5 INJECTION, SOLUTION INTRAMUSCULAR; INTRAVENOUS
Status: CANCELLED | OUTPATIENT
Start: 2025-08-10

## 2025-08-10 RX ORDER — SODIUM CHLORIDE 0.9 % (FLUSH) 0.9 %
5-40 SYRINGE (ML) INJECTION PRN
Status: CANCELLED | OUTPATIENT
Start: 2025-08-10

## 2025-08-10 RX ORDER — SODIUM CHLORIDE 9 MG/ML
INJECTION, SOLUTION INTRAVENOUS PRN
Status: CANCELLED | OUTPATIENT
Start: 2025-08-10

## 2025-08-10 RX ORDER — OXYCODONE HYDROCHLORIDE 5 MG/1
5 TABLET ORAL
Status: CANCELLED | OUTPATIENT
Start: 2025-08-10

## 2025-08-10 RX ORDER — ONDANSETRON 2 MG/ML
4 INJECTION INTRAMUSCULAR; INTRAVENOUS
Status: CANCELLED | OUTPATIENT
Start: 2025-08-10

## 2025-08-10 RX ORDER — HYDRALAZINE HYDROCHLORIDE 20 MG/ML
10 INJECTION INTRAMUSCULAR; INTRAVENOUS
Status: CANCELLED | OUTPATIENT
Start: 2025-08-10

## 2025-08-10 RX ORDER — IPRATROPIUM BROMIDE AND ALBUTEROL SULFATE 2.5; .5 MG/3ML; MG/3ML
1 SOLUTION RESPIRATORY (INHALATION)
Status: CANCELLED | OUTPATIENT
Start: 2025-08-10

## 2025-08-10 RX ORDER — SODIUM CHLORIDE 0.9 % (FLUSH) 0.9 %
5-40 SYRINGE (ML) INJECTION EVERY 12 HOURS SCHEDULED
Status: CANCELLED | OUTPATIENT
Start: 2025-08-10

## 2025-08-10 RX ORDER — LORAZEPAM 2 MG/ML
0.5 INJECTION INTRAMUSCULAR
Status: CANCELLED | OUTPATIENT
Start: 2025-08-10

## 2025-08-10 RX ORDER — FENTANYL CITRATE 50 UG/ML
25 INJECTION, SOLUTION INTRAMUSCULAR; INTRAVENOUS EVERY 5 MIN PRN
Status: CANCELLED | OUTPATIENT
Start: 2025-08-10

## 2025-08-10 RX ORDER — HYDROMORPHONE HYDROCHLORIDE 1 MG/ML
0.5 INJECTION, SOLUTION INTRAMUSCULAR; INTRAVENOUS; SUBCUTANEOUS EVERY 5 MIN PRN
Status: CANCELLED | OUTPATIENT
Start: 2025-08-10

## 2025-08-10 RX ORDER — PROCHLORPERAZINE EDISYLATE 5 MG/ML
5 INJECTION INTRAMUSCULAR; INTRAVENOUS
Status: CANCELLED | OUTPATIENT
Start: 2025-08-10

## 2025-08-11 ENCOUNTER — HOSPITAL ENCOUNTER (OUTPATIENT)
Facility: HOSPITAL | Age: 80
Setting detail: OUTPATIENT SURGERY
Discharge: HOME | End: 2025-08-11
Attending: SURGERY | Admitting: SURGERY
Payer: MEDICARE

## 2025-08-11 ENCOUNTER — ANESTHESIA EVENT (OUTPATIENT)
Facility: HOSPITAL | Age: 80
End: 2025-08-11
Payer: MEDICARE

## 2025-08-11 ENCOUNTER — ANESTHESIA (OUTPATIENT)
Facility: HOSPITAL | Age: 80
End: 2025-08-11
Payer: MEDICARE

## 2025-08-11 VITALS
DIASTOLIC BLOOD PRESSURE: 87 MMHG | HEIGHT: 74 IN | WEIGHT: 240.08 LBS | TEMPERATURE: 97.2 F | BODY MASS INDEX: 30.81 KG/M2 | RESPIRATION RATE: 14 BRPM | OXYGEN SATURATION: 95 % | HEART RATE: 84 BPM | SYSTOLIC BLOOD PRESSURE: 132 MMHG

## 2025-08-11 PROCEDURE — 6360000002 HC RX W HCPCS: Performed by: NURSE ANESTHETIST, CERTIFIED REGISTERED

## 2025-08-11 PROCEDURE — 3700000000 HC ANESTHESIA ATTENDED CARE: Performed by: SURGERY

## 2025-08-11 PROCEDURE — 2709999900 HC NON-CHARGEABLE SUPPLY: Performed by: SURGERY

## 2025-08-11 PROCEDURE — 3700000001 HC ADD 15 MINUTES (ANESTHESIA): Performed by: SURGERY

## 2025-08-11 PROCEDURE — 88304 TISSUE EXAM BY PATHOLOGIST: CPT

## 2025-08-11 PROCEDURE — 3600000012 HC SURGERY LEVEL 2 ADDTL 15MIN: Performed by: SURGERY

## 2025-08-11 PROCEDURE — 6360000002 HC RX W HCPCS: Performed by: SURGERY

## 2025-08-11 PROCEDURE — 3600000002 HC SURGERY LEVEL 2 BASE: Performed by: SURGERY

## 2025-08-11 PROCEDURE — 7100000001 HC PACU RECOVERY - ADDTL 15 MIN: Performed by: SURGERY

## 2025-08-11 PROCEDURE — 2580000003 HC RX 258: Performed by: NURSE ANESTHETIST, CERTIFIED REGISTERED

## 2025-08-11 PROCEDURE — 7100000000 HC PACU RECOVERY - FIRST 15 MIN: Performed by: SURGERY

## 2025-08-11 RX ORDER — FENTANYL CITRATE 50 UG/ML
100 INJECTION, SOLUTION INTRAMUSCULAR; INTRAVENOUS
Status: DISCONTINUED | OUTPATIENT
Start: 2025-08-11 | End: 2025-08-11 | Stop reason: HOSPADM

## 2025-08-11 RX ORDER — SODIUM CHLORIDE, SODIUM LACTATE, POTASSIUM CHLORIDE, CALCIUM CHLORIDE 600; 310; 30; 20 MG/100ML; MG/100ML; MG/100ML; MG/100ML
INJECTION, SOLUTION INTRAVENOUS
Status: DISCONTINUED | OUTPATIENT
Start: 2025-08-11 | End: 2025-08-11 | Stop reason: SDUPTHER

## 2025-08-11 RX ORDER — LIDOCAINE HYDROCHLORIDE AND EPINEPHRINE 10; 10 MG/ML; UG/ML
INJECTION, SOLUTION INFILTRATION; PERINEURAL PRN
Status: DISCONTINUED | OUTPATIENT
Start: 2025-08-11 | End: 2025-08-11 | Stop reason: HOSPADM

## 2025-08-11 RX ORDER — PHENYLEPHRINE HCL IN 0.9% NACL 0.4MG/10ML
SYRINGE (ML) INTRAVENOUS
Status: DISCONTINUED | OUTPATIENT
Start: 2025-08-11 | End: 2025-08-11 | Stop reason: SDUPTHER

## 2025-08-11 RX ORDER — SODIUM CHLORIDE, SODIUM LACTATE, POTASSIUM CHLORIDE, CALCIUM CHLORIDE 600; 310; 30; 20 MG/100ML; MG/100ML; MG/100ML; MG/100ML
INJECTION, SOLUTION INTRAVENOUS CONTINUOUS
Status: DISCONTINUED | OUTPATIENT
Start: 2025-08-11 | End: 2025-08-11 | Stop reason: HOSPADM

## 2025-08-11 RX ORDER — ACETAMINOPHEN 500 MG
1000 TABLET ORAL ONCE
Status: DISCONTINUED | OUTPATIENT
Start: 2025-08-11 | End: 2025-08-11 | Stop reason: HOSPADM

## 2025-08-11 RX ORDER — CEFAZOLIN SODIUM 1 G/3ML
INJECTION, POWDER, FOR SOLUTION INTRAMUSCULAR; INTRAVENOUS
Status: DISCONTINUED | OUTPATIENT
Start: 2025-08-11 | End: 2025-08-11 | Stop reason: SDUPTHER

## 2025-08-11 RX ORDER — SODIUM CHLORIDE 0.9 % (FLUSH) 0.9 %
5-40 SYRINGE (ML) INJECTION EVERY 12 HOURS SCHEDULED
Status: DISCONTINUED | OUTPATIENT
Start: 2025-08-11 | End: 2025-08-11 | Stop reason: HOSPADM

## 2025-08-11 RX ORDER — LIDOCAINE HYDROCHLORIDE 10 MG/ML
1 INJECTION, SOLUTION EPIDURAL; INFILTRATION; INTRACAUDAL; PERINEURAL
Status: DISCONTINUED | OUTPATIENT
Start: 2025-08-11 | End: 2025-08-11 | Stop reason: HOSPADM

## 2025-08-11 RX ORDER — SODIUM CHLORIDE 0.9 % (FLUSH) 0.9 %
5-40 SYRINGE (ML) INJECTION PRN
Status: DISCONTINUED | OUTPATIENT
Start: 2025-08-11 | End: 2025-08-11 | Stop reason: HOSPADM

## 2025-08-11 RX ORDER — PROPOFOL 10 MG/ML
INJECTION, EMULSION INTRAVENOUS
Status: DISCONTINUED | OUTPATIENT
Start: 2025-08-11 | End: 2025-08-11 | Stop reason: SDUPTHER

## 2025-08-11 RX ORDER — SODIUM CHLORIDE 9 MG/ML
INJECTION, SOLUTION INTRAVENOUS PRN
Status: DISCONTINUED | OUTPATIENT
Start: 2025-08-11 | End: 2025-08-11 | Stop reason: HOSPADM

## 2025-08-11 RX ORDER — MIDAZOLAM HYDROCHLORIDE 2 MG/2ML
2 INJECTION, SOLUTION INTRAMUSCULAR; INTRAVENOUS AS NEEDED
Status: DISCONTINUED | OUTPATIENT
Start: 2025-08-11 | End: 2025-08-11 | Stop reason: HOSPADM

## 2025-08-11 RX ORDER — APIXABAN 5 MG/1
5 TABLET, FILM COATED ORAL 2 TIMES DAILY
Qty: 60 TABLET | Refills: 5 | Status: SHIPPED | OUTPATIENT
Start: 2025-08-11

## 2025-08-11 RX ADMIN — SODIUM CHLORIDE, POTASSIUM CHLORIDE, SODIUM LACTATE AND CALCIUM CHLORIDE: 600; 310; 30; 20 INJECTION, SOLUTION INTRAVENOUS at 08:56

## 2025-08-11 RX ADMIN — Medication 80 MCG: at 09:44

## 2025-08-11 RX ADMIN — CEFAZOLIN 2 G: 330 INJECTION, POWDER, FOR SOLUTION INTRAMUSCULAR; INTRAVENOUS at 09:11

## 2025-08-11 RX ADMIN — Medication 80 MCG: at 09:12

## 2025-08-11 RX ADMIN — PROPOFOL 75 MCG/KG/MIN: 10 INJECTION, EMULSION INTRAVENOUS at 09:01

## 2025-08-11 RX ADMIN — PROPOFOL 20 MG: 10 INJECTION, EMULSION INTRAVENOUS at 09:03

## 2025-08-11 RX ADMIN — Medication 80 MCG: at 09:36

## 2025-08-11 RX ADMIN — PROPOFOL 30 MG: 10 INJECTION, EMULSION INTRAVENOUS at 09:42

## 2025-08-11 RX ADMIN — Medication 80 MCG: at 09:22

## 2025-08-11 RX ADMIN — PROPOFOL 30 MG: 10 INJECTION, EMULSION INTRAVENOUS at 09:02

## 2025-08-11 RX ADMIN — Medication 80 MCG: at 09:30

## 2025-08-11 RX ADMIN — Medication 80 MCG: at 09:17

## 2025-08-11 ASSESSMENT — PAIN SCALES - GENERAL
PAINLEVEL_OUTOF10: 0
PAINLEVEL_OUTOF10: 0

## 2025-08-11 ASSESSMENT — PAIN - FUNCTIONAL ASSESSMENT: PAIN_FUNCTIONAL_ASSESSMENT: 0-10

## 2025-08-24 RX ORDER — ROSUVASTATIN CALCIUM 10 MG/1
TABLET, COATED ORAL
Qty: 90 TABLET | Refills: 0 | Status: SHIPPED | OUTPATIENT
Start: 2025-08-24

## (undated) DEVICE — PAD BD MATTRESS 73X32 IN STD CONVOLUTED FOAM LTX FREE

## (undated) DEVICE — Device

## (undated) DEVICE — HANDLE LT SNAP ON ULT DURABLE LENS FOR TRUMPF ALC DISPOSABLE

## (undated) DEVICE — BLADE,CARBON-STEEL,15,STRL,DISPOSABLE,TB: Brand: MEDLINE

## (undated) DEVICE — LAPAROSCOPIC TROCAR SLEEVE/SINGLE USE: Brand: KII® OPTICAL ACCESS SYSTEM

## (undated) DEVICE — CYSTO-SMH: Brand: MEDLINE INDUSTRIES, INC.

## (undated) DEVICE — SPONGE LAPAROTOMY W18XL18IN WHITE STRUNG RADIOPAQUE STERILE

## (undated) DEVICE — JELLY,LUBE,STERILE,FLIP TOP,TUBE,4-OZ: Brand: MEDLINE

## (undated) DEVICE — SUTURE PERMAHAND SZ 2-0 L30IN NONABSORBABLE BLK L17MM BB K883H

## (undated) DEVICE — SUTURE SZ 0 27IN 5/8 CIR UR-6  TAPER PT VIOLET ABSRB VICRYL J603H

## (undated) DEVICE — INTENT OT USE PROVIDES A STERILE INTERFACE BETWEEN THE OPERATING ROOM SURGICAL LAMPS (NON-STERILE) AND THE SURGEON OR STAFF WORKING IN THE STERILE FIELD.: Brand: ASPEN® ALC PLUS LIGHT HANDLE COVER

## (undated) DEVICE — DISSECTOR LAP DIA5MM BLNT TIP ENDOPATH

## (undated) DEVICE — CONTAINER,SPECIMEN,3OZ,OR STRL: Brand: MEDLINE

## (undated) DEVICE — NEEDLE INSUFFLATION 14 GAX120 MM SURGINEEDLE

## (undated) DEVICE — TROCAR: Brand: KII OPTICAL ACCESS SYSTEM

## (undated) DEVICE — SUTURE MCRYL SZ 4-0 L27IN ABSRB UD L19MM PS-2 1/2 CIR PRIM Y426H

## (undated) DEVICE — SOLUTION IRRIG 1000ML 0.9% SOD CHL USP POUR PLAS BTL

## (undated) DEVICE — TRI-LUMEN FILTERED TUBE SET WITH ACTIVATED CHARCOAL FILTER: Brand: AIRSEAL

## (undated) DEVICE — MASTISOL ADHESIVE LIQ 2/3ML

## (undated) DEVICE — CATHETER URETH 18FR BLLN 5CC SIL ALLY W/ SIL HYDRGEL 2 W F

## (undated) DEVICE — GLOVE ORANGE PI 8   MSG9080

## (undated) DEVICE — DAVINCI PROSTATECTOMY-SMH: Brand: MEDLINE INDUSTRIES, INC.

## (undated) DEVICE — SYRINGE MED 10ML LUERLOCK TIP W/O SFTY DISP

## (undated) DEVICE — TRAY SKIN SCRUB W/4 COMPARTMENT

## (undated) DEVICE — SUTURE VCRL SZ 2-0 L27IN ABSRB VLT L26MM SH 1/2 CIR J317H

## (undated) DEVICE — SUTURE EASE CROSSBOW CLSR SYS

## (undated) DEVICE — TUBING IRRIG L77IN DIA0.241IN L BOR FOR CYSTO W/ NVENT

## (undated) DEVICE — SCISSORS ENDOSCP DIA5MM CRV MPLR CAUT W/ RATCH HNDL

## (undated) DEVICE — SOLUTION IRRIG 1000ML 09% SOD CHL USP PIC PLAS CONTAINER

## (undated) DEVICE — SHEETS TRANSFER  LATERAL 34 IN X 46 IN

## (undated) DEVICE — SUTURE DEV SZ 2-0 WND CLSR ABSRB GS-22 VLOC COVIDIEN VLOCM2145

## (undated) DEVICE — TROCAR ENDOSCP L100MM DIA12MM STBL SL BLDELSS ENDOPATH XCEL

## (undated) DEVICE — BLADE CLIPPER GEN PURP NS

## (undated) DEVICE — RESERVOIR,SUCTION,100CC,SILICONE: Brand: MEDLINE

## (undated) DEVICE — BAG SPEC REM 224ML W4XL6IN DIA10MM 1 HND GYN DISP ENDOPCH

## (undated) DEVICE — AIRSEAL 8 MM ACCESS PORT AND LOW PROFILE OBTURATOR WITH BLADELESS OPTICAL TIP, 120 MM LENGTH: Brand: AIRSEAL

## (undated) DEVICE — AIRSEAL 12 MM ACCESS PORT AND PALM GRIP OBTURATOR WITH BLADELESS OPTICAL TIP, 120 MM LENGTH: Brand: AIRSEAL

## (undated) DEVICE — ELECTRODE PT RET AD L9FT HI MOIST COND ADH HYDRGEL CORDED

## (undated) DEVICE — VESSEL SEALER EXTEND: Brand: ENDOWRIST

## (undated) DEVICE — SUTURE ABSRB L6IN L37MM 0 GS-21 GRN 1/2 CIR TAPR PNT NDL VLOCL0306

## (undated) DEVICE — TIP COVER ACCESSORY

## (undated) DEVICE — TROCARS: Brand: KII® OPTICAL ACCESS SYSTEM

## (undated) DEVICE — SOLUTION IRRIG 1000ML STRL H2O USP PLAS POUR BTL

## (undated) DEVICE — DUAL LUMEN STOMACH TUBE MULTI-FUNCTIONAL PORT: Brand: SALEM SUMP

## (undated) DEVICE — ELECTRO LUBE IS A SINGLE PATIENT USE DEVICE THAT IS INTENDED TO BE USED ON ELECTROSURGICAL ELECTRODES TO REDUCE STICKING.: Brand: KEY SURGICAL ELECTRO LUBE

## (undated) DEVICE — TAPE,CLOTH/SILK,CURAD,3"X10YD,LF,40/CS: Brand: CURAD

## (undated) DEVICE — GARMENT,MEDLINE,DVT,INT,CALF,LG, GEN2: Brand: MEDLINE

## (undated) DEVICE — SHEET,DRAPE,UNDERBUTTOCK,GRAD POUCH,PORT: Brand: MEDLINE

## (undated) DEVICE — CATHETER IV 14GA L3.25IN ORNG FLUORINATED ETHYLENE

## (undated) DEVICE — SUTURE VCRL SZ 4-0 L27IN ABSRB UD L17MM RB-1 1/2 CIR J214H

## (undated) DEVICE — SUTURE NONABSORBABLE MONOFILAMENT 2-0 FS 18 IN ETHILON 664H

## (undated) DEVICE — AGENT HEMOSTATIC SURG ORIGINAL ABS 2X14IN LOOSE KNIT 12/BX

## (undated) DEVICE — SEAL

## (undated) DEVICE — ARM DRAPE

## (undated) DEVICE — SUTURE PERMA-HAND SZ 2-0 L30IN NONABSORBABLE BLK L26MM SH K833H

## (undated) DEVICE — SUTURE N ABSRB MONOFILAMENT 4-0 RB1 30 IN BLU PROLENE 8871H

## (undated) DEVICE — GLOVE SURG SZ 8 L12IN FNGR THK79MIL GRN LTX FREE

## (undated) DEVICE — SOLUTION IRRIGATION STRL H2O 1000 ML UROMATIC CONTAINER

## (undated) DEVICE — TOWEL,OR,DSP,ST,BLUE,STD,2/PK,40PK/CS: Brand: MEDLINE

## (undated) DEVICE — PACK,BASIC,SIRUS,V: Brand: MEDLINE

## (undated) DEVICE — DRAIN CHN 19FR L0.25IN DIA6.3MM SIL RND HUBLESS FULL FLUT

## (undated) DEVICE — 3M™ IOBAN™ 2 ANTIMICROBIAL INCISE DRAPE 6651EZ: Brand: IOBAN™ 2

## (undated) DEVICE — 4-PORT MANIFOLD: Brand: NEPTUNE 2

## (undated) DEVICE — OBTURATOR ROBOTIC DIA8MM BLDELSS ENDOSCP DISP DA VINCI SI

## (undated) DEVICE — SOLUTION IV 1000ML 0.9% SOD CHL PH 5 INJ USP VIAFLX PLAS

## (undated) DEVICE — GLOVE SURG SZ 8 L12IN FNGR THK94MIL STD WHT LTX FREE

## (undated) DEVICE — GARMENT COMPR M FOR 12-18IN CALF INTMIT SGL BLDR HEMO-FORCE

## (undated) DEVICE — GARMENT,MEDLINE,DVT,INT,CALF,MED, GEN2: Brand: MEDLINE

## (undated) DEVICE — NEEDLE SAFETY MONOJECT 25GX1-1/2

## (undated) DEVICE — SPONGE GZ W4XL4IN COT 12 PLY TYP VII WVN C FLD DSGN STERILE

## (undated) DEVICE — SUTURE MONOCRYL SZ 3-0 L27IN ABSRB UD L19MM PS-2 3/8 CIR PRIM Y427H

## (undated) DEVICE — SYRINGE MED 20ML STD CLR PLAS LUERSLIP TIP N CTRL DISP

## (undated) DEVICE — ADHESIVE SKIN CLSR 0.7ML TOP DERMBND ADV

## (undated) DEVICE — GLOVE ORANGE PI 7   MSG9070

## (undated) DEVICE — SUTURE VICRYL + SZ 3-0 L27IN ABSRB UD L26MM SH 1/2 CIR VCP416H

## (undated) DEVICE — DRAPE,REIN 53X77,STERILE: Brand: MEDLINE